# Patient Record
Sex: FEMALE | Race: WHITE | NOT HISPANIC OR LATINO | Employment: FULL TIME | URBAN - METROPOLITAN AREA
[De-identification: names, ages, dates, MRNs, and addresses within clinical notes are randomized per-mention and may not be internally consistent; named-entity substitution may affect disease eponyms.]

---

## 2022-07-26 ENCOUNTER — TELEPHONE (OUTPATIENT)
Dept: NEUROLOGY | Facility: CLINIC | Age: 67
End: 2022-07-26
Payer: COMMERCIAL

## 2022-07-26 NOTE — TELEPHONE ENCOUNTER
----- Message from Mckayla Savage sent at 7/26/2022  4:44 PM CDT -----  Contact: JEANNETTE SAMANIEGO [[id number]]  Type: Patient Call Back    Who called:JEANNETTE SAMANIEGO [[id number]]    What is the request in detail: The patient states that she was contacted about her appointment being scheduled incorrectly. She would like to know what she is supposed to do in regards to her appointment. She states that she came from [place] and has a hotel to she will have to be seen on tomorrow.      Can the clinic reply by [facility]?    Would the patient rather a call back or a response via [facility]?     Best call back number: [phone] (mobile)    Additional Information:

## 2022-07-26 NOTE — TELEPHONE ENCOUNTER
Spoke with patient and informed have not heard back from movement clinic. Patient came here from [place]  For appointment on 7/27 and got a hotel room. Advised will reach out to manager to see if there is any way to get patient seen by movement instead since she has travelled and paid for hotel. Also sent message to Eugene Kahn to see if patient can be seen.

## 2022-07-27 ENCOUNTER — TELEPHONE (OUTPATIENT)
Dept: NEUROLOGY | Facility: CLINIC | Age: 67
End: 2022-07-27

## 2022-07-27 ENCOUNTER — OFFICE VISIT (OUTPATIENT)
Dept: NEUROLOGY | Facility: CLINIC | Age: 67
End: 2022-07-27
Payer: MEDICARE

## 2022-07-27 VITALS — DIASTOLIC BLOOD PRESSURE: 84 MMHG | SYSTOLIC BLOOD PRESSURE: 138 MMHG | HEART RATE: 79 BPM | HEIGHT: 63 IN

## 2022-07-27 DIAGNOSIS — Z71.89 COUNSELING REGARDING GOALS OF CARE: ICD-10-CM

## 2022-07-27 DIAGNOSIS — G25.0 ESSENTIAL TREMOR: Primary | ICD-10-CM

## 2022-07-27 PROBLEM — G25.81 RLS (RESTLESS LEGS SYNDROME): Status: ACTIVE | Noted: 2020-12-29

## 2022-07-27 PROBLEM — Z78.9 STATIN INTOLERANCE: Status: ACTIVE | Noted: 2017-06-01

## 2022-07-27 PROCEDURE — 99205 OFFICE O/P NEW HI 60 MIN: CPT | Mod: S$PBB,,, | Performed by: PHYSICIAN ASSISTANT

## 2022-07-27 PROCEDURE — 99205 PR OFFICE/OUTPT VISIT, NEW, LEVL V, 60-74 MIN: ICD-10-PCS | Mod: S$PBB,,, | Performed by: PHYSICIAN ASSISTANT

## 2022-07-27 PROCEDURE — 99212 OFFICE O/P EST SF 10 MIN: CPT | Mod: PBBFAC | Performed by: PHYSICIAN ASSISTANT

## 2022-07-27 PROCEDURE — 99999 PR PBB SHADOW E&M-EST. PATIENT-LVL II: CPT | Mod: PBBFAC,,, | Performed by: PHYSICIAN ASSISTANT

## 2022-07-27 PROCEDURE — 99999 PR PBB SHADOW E&M-EST. PATIENT-LVL II: ICD-10-PCS | Mod: PBBFAC,,, | Performed by: PHYSICIAN ASSISTANT

## 2022-07-27 RX ORDER — CARVEDILOL 12.5 MG/1
12.5 TABLET ORAL 2 TIMES DAILY
COMMUNITY
Start: 2022-04-11

## 2022-07-27 RX ORDER — BUTALBITAL, ACETAMINOPHEN AND CAFFEINE 50; 325; 40 MG/1; MG/1; MG/1
1-2 TABLET ORAL 3 TIMES DAILY PRN
Status: ON HOLD | COMMUNITY
Start: 2022-02-15 | End: 2023-04-25 | Stop reason: HOSPADM

## 2022-07-27 RX ORDER — LEVOTHYROXINE SODIUM 112 UG/1
112 TABLET ORAL
COMMUNITY
Start: 2022-02-14 | End: 2023-07-28

## 2022-07-27 RX ORDER — BUSPIRONE HYDROCHLORIDE 15 MG/1
7.5-15 TABLET ORAL
Status: ON HOLD | COMMUNITY
Start: 2021-11-17 | End: 2023-04-19

## 2022-07-27 RX ORDER — ERGOCALCIFEROL 1.25 MG/1
50000 CAPSULE ORAL
COMMUNITY
Start: 2022-07-08

## 2022-07-27 RX ORDER — BUPROPION HYDROCHLORIDE 300 MG/1
300 TABLET ORAL DAILY PRN
COMMUNITY
Start: 2022-05-19

## 2022-07-27 RX ORDER — FAMOTIDINE 20 MG/1
20 TABLET, FILM COATED ORAL NIGHTLY
COMMUNITY
Start: 2022-03-01

## 2022-07-27 RX ORDER — SERTRALINE HYDROCHLORIDE 100 MG/1
100 TABLET, FILM COATED ORAL NIGHTLY
Status: ON HOLD | COMMUNITY
Start: 2022-05-19 | End: 2024-01-22

## 2022-07-27 RX ORDER — PHENTERMINE HYDROCHLORIDE 15 MG/1
15 CAPSULE ORAL DAILY
COMMUNITY
Start: 2022-07-12 | End: 2022-12-12

## 2022-07-27 NOTE — PROGRESS NOTES
Name: Anna Wagoner  MRN: [id number]   CSN: [id number]      Date: 07/27/2022    Referring physician:  Uriah Self  No address on file    Chief Complaint: tremor     History of Present Illness (HPI): Anna Wagoner is a R HANDED 67 y.o. female with a medical issues significant for HTN, HLD, hypothyroidism (hashimotos), and anxiety who presents for tremors. Referral from [facility]. Accompanied by daughter. Previously on primidone which she felt caused weight gain and constipation. No longer on it. She tried gabapentin and topamax in the past but stop due to cognitive side effects.  Propranolol caused nightmares and hallucinations?   She has had the tremors since she was at least 11 or 12 years old. Worse on the L hand now, feels that the tremor is progressing. Notices tremor whenever she is typing as well as whenever she is holding something. It does affect her handwriting. Tremor improves with EtOH. She is in a [email or web address] group. She works full-time in [email or web address]. Plans to work for 5 more years.   Primidone helped the tremor but after a few weeks didn't find it as effective. Max dose was 100 mg BID. Felt that she was gaining weight on primidone. Does not think that this was because of thyroid issues. Per chart review, clonazepam caused daytime lethargy but she does not recall this. She asks about medications that she can take during the day.   Very sensitive to side effects of medications.   Currently taking phentermine right now for weight loss. Learning how to eat different. Years ago she tried topamax but caused cognitive side effects.    Not interested in DBS or focused ultrasound.   Asks about BPPV, sees ENT in [place].       From outside neurology provider note  67 y.o. female presents today for neurological follow up. Last seen in follow up with Dr. Desai in January 2022, when primidone was prescribed given findings most consistent with essential tremor. She does have an essential tremor  (both limbs and voice) which she has had since about age 11. Tremor more noticeable in left upper extremity than right. Requests referral to movement specialist. Scheduled to see weight-loss specialist given weight gain she attributes to Primidone.   Neurocognitive status: Neurocognitive function has remained stable.   Current pertinent medications:Primidone triggered weight gain and constipation, so she has discontinued. She tried gabapentin and topamax, but discontinued due to cognitive side effects. Klonopin prn triggered daytime lethargy. Propranolol triggered nightmares and hallucinations.           Family History: none     Neuroleptic Exposure: none          Review of Systems:   Review of Systems   Constitutional: Negative for chills, fever and malaise/fatigue.   HENT: Negative for hearing loss.    Eyes: Negative for blurred vision and double vision.   Respiratory: Negative for cough, shortness of breath and stridor.    Cardiovascular: Negative for chest pain and leg swelling.   Gastrointestinal: Negative for constipation, diarrhea and nausea.   Genitourinary: Negative for frequency and urgency.   Musculoskeletal: Negative for falls.   Skin: Negative for itching and rash.   Neurological: Positive for tremors. Negative for dizziness, loss of consciousness and weakness.   Psychiatric/Behavioral: Negative for hallucinations and memory loss.           Past Medical History: The patient  has no past medical history on file.    Social History: The patient      Family History: Their family history is not on file.    Allergies: Metoprolol succinate     Meds:   Current Outpatient Medications on File Prior to Visit   Medication Sig Dispense Refill    busPIRone (BUSPAR) 15 MG tablet Take 7.5-15 mg by mouth.      famotidine (PEPCID) 20 MG tablet Take 20 mg by mouth.      levothyroxine (SYNTHROID) 112 MCG tablet Take 112 mcg by mouth.      buPROPion (WELLBUTRIN XL) 300 MG 24 hr tablet Take 300 mg by mouth daily as  "needed.      butalbital-acetaminophen-caffeine -40 mg (FIORICET, ESGIC) -40 mg per tablet Take 1-2 tablets by mouth 3 (three) times daily as needed.      carvediloL (COREG) 12.5 MG tablet Take 12.5 mg by mouth 2 (two) times daily.      ergocalciferol (ERGOCALCIFEROL) 50,000 unit Cap Take 50,000 Units by mouth every 7 days.      phentermine 15 MG capsule Take 15 mg by mouth once daily.      sertraline (ZOLOFT) 100 MG tablet Take 100 mg by mouth once daily.      ubidecarenone (COENZYME Q10 ORAL) Take 2 tablets by mouth once daily.       No current facility-administered medications on file prior to visit.       Exam:  /84 (BP Location: Left arm, Patient Position: Sitting, BP Method: Medium (Automatic))   Pulse 79   Ht 5' 3" (1.6 m)     Constitutional  Well-developed, well-nourished, appears stated age   Ophthalmoscopic  No papilledema with no hemorrhages or exudates bilaterally   Cardiovascular  Radial pulses 2+ and symmetric, no LE edema bilaterally   Neurological    * Mental status  MOCA =      - Orientation  Oriented to person, place, time, and situation     - Memory   Intact recent and remote     - Attention/concentration  Attentive, vigilant during exam     - Language  Naming & repetition intact, +2-step commands     - Fund of knowledge  Aware of current events     - Executive  Well-organized thoughts     - Other     * Cranial nerves       - CN II  PERRL, visual fields full to confrontation     - CN III, IV, VI  Extraocular movements full, normal pursuits and saccades     - CN V  Sensation V1 - V3 intact     - CN VII  Face strong and symmetric bilaterally     - CN VIII  Hearing intact bilaterally     - CN IX, X  Palate raises midline and symmetric     - CN XI  SCM and trapezius 5/5 bilaterally     - CN XII  Tongue midline   * Motor  Muscle bulk normal, strength 5/5 throughout   * Sensory   Intact to temperature    * Coordination  No dysmetria with finger-to-nose or heel-to-shin   * Gait  " See below.   * Deep tendon reflexes  2+ and symmetric throughout   Babinski downgoing bilaterally   * Specialized movement exam  No hypophonic speech, very pleasant, appropriately animated    No facial masking.   No cogwheel rigidity, some difficulty relaxing. Tremor overlay of LUE with checking tone.    mild L bradykinesia 2/2 tremor overlay    No other dystonia, chorea, athetosis, myoclonus, or tics.   No motor impersistence.   Normal-based gait.   No shortened stride length.   No abnormal arm swing.     No postural instability.    vocal tremor     bilateral postural tremors and kinetic tremor, L > R -- high frequency     bilateral resting tremor, high frequency  L > R      Laboratory/Radiological:  - Results:  No results found for any previous visit.       - Independent review of images:      - Independent review of consultant's notes: Giselle    ASSESSMENT/PLAN:  1. Tremor   - essential tremor x 50+ years   - tried and failed primidone, propranolol, topamax, gabapentin, clonazepam all due to side effects  - has not tried remeron but would be relucant to try given past weight gain/currently trying to lose weight  - discussed DBS vs FUS -- not interested  - discussed alternative strategies such as ready-steady glove and weighted utensils  - patient asked about luigi-trio -- we discussed that we have not had great success for tremor but will send rx for trial of luigi-trio pending insurance approval since she has failed just about every ET medication   - we also discussed that she is currently taking phentermine which is likely exacerbating her tremor -- she states that this is temporary for weight loss before granddaughter's wedding              Follow up: in 3-4 months with [facility]     Collaborating Physician, Dr. Jimenez, was available during today's encounter. Any change to plan along with cosign to appear in the EMR.       Total time spent with the patient: 65 minutes.  50 minutes of face-to-face consultation and  15 minutes of chart review and coordination of care, on the day of the visit. This includes face to face time and non-face to face time preparing to see the patient (eg, review of tests), obtaining and/or reviewing separately obtained history, documenting clinical information in the electronic or other health record, independently interpreting resultsand communicating results to the patient/family/caregiver, or care coordination.         Kim Montgomery PA-C   [facility]  Department of Neurology  Movement Disorders

## 2022-07-27 NOTE — TELEPHONE ENCOUNTER
----- Message from Brittney Anderson MA sent at 7/26/2022  7:02 PM CDT -----  Regarding: FW: Pt needs a neuro doctor that specializes in movement.  Contact: [place]ma[place] (Dr. Boswell) [phone]  I think this was suppose to go to Neurology.    Thanks  ----- Message -----  From: Bre Guadarrama  Sent: 7/26/2022   4:24 PM CDT  To: , #  Subject: Pt needs a neuro doctor that specializes in #    Caller (Rosemarie) Dr. Boswell office requesting a call back to see if the patient can be seen by tomorrow as the patient got an hotel to come down for a visit with Dr. Boswell and he does not specialize in movement.

## 2022-07-28 ENCOUNTER — TELEPHONE (OUTPATIENT)
Dept: NEUROLOGY | Facility: CLINIC | Age: 67
End: 2022-07-28
Payer: COMMERCIAL

## 2022-08-02 ENCOUNTER — PATIENT MESSAGE (OUTPATIENT)
Dept: NEUROLOGY | Facility: CLINIC | Age: 67
End: 2022-08-02
Payer: COMMERCIAL

## 2022-12-12 ENCOUNTER — OFFICE VISIT (OUTPATIENT)
Dept: NEUROLOGY | Facility: CLINIC | Age: 67
End: 2022-12-12
Payer: MEDICARE

## 2022-12-12 VITALS
DIASTOLIC BLOOD PRESSURE: 73 MMHG | BODY MASS INDEX: 35.76 KG/M2 | SYSTOLIC BLOOD PRESSURE: 122 MMHG | HEART RATE: 64 BPM | WEIGHT: 201.81 LBS | HEIGHT: 63 IN

## 2022-12-12 DIAGNOSIS — F41.9 ANXIETY: ICD-10-CM

## 2022-12-12 DIAGNOSIS — R51.9 CHRONIC NONINTRACTABLE HEADACHE, UNSPECIFIED HEADACHE TYPE: ICD-10-CM

## 2022-12-12 DIAGNOSIS — Z71.89 COUNSELING REGARDING GOALS OF CARE: ICD-10-CM

## 2022-12-12 DIAGNOSIS — G25.0 ESSENTIAL TREMOR: Primary | ICD-10-CM

## 2022-12-12 DIAGNOSIS — G89.29 CHRONIC NONINTRACTABLE HEADACHE, UNSPECIFIED HEADACHE TYPE: ICD-10-CM

## 2022-12-12 PROCEDURE — 99214 OFFICE O/P EST MOD 30 MIN: CPT | Mod: PBBFAC | Performed by: PHYSICIAN ASSISTANT

## 2022-12-12 PROCEDURE — 99214 OFFICE O/P EST MOD 30 MIN: CPT | Mod: S$PBB,,, | Performed by: PHYSICIAN ASSISTANT

## 2022-12-12 PROCEDURE — 99999 PR PBB SHADOW E&M-EST. PATIENT-LVL IV: CPT | Mod: PBBFAC,,, | Performed by: PHYSICIAN ASSISTANT

## 2022-12-12 PROCEDURE — 99999 PR PBB SHADOW E&M-EST. PATIENT-LVL IV: ICD-10-PCS | Mod: PBBFAC,,, | Performed by: PHYSICIAN ASSISTANT

## 2022-12-12 PROCEDURE — 99214 PR OFFICE/OUTPT VISIT, EST, LEVL IV, 30-39 MIN: ICD-10-PCS | Mod: S$PBB,,, | Performed by: PHYSICIAN ASSISTANT

## 2022-12-12 RX ORDER — ROSUVASTATIN CALCIUM 10 MG/1
10 TABLET, COATED ORAL NIGHTLY
COMMUNITY

## 2022-12-12 RX ORDER — PRIMIDONE 50 MG/1
50 TABLET ORAL 2 TIMES DAILY
Qty: 60 TABLET | Refills: 11 | Status: ON HOLD | OUTPATIENT
Start: 2022-12-12 | End: 2023-04-25 | Stop reason: HOSPADM

## 2022-12-12 NOTE — PATIENT INSTRUCTIONS
Primidone 50 mg titration    Week 1: Take 1/2 tab in PM  Week 2: Take 1/2 tab in AM and 1/2 tab in PM  Week 3: Take 1/2 tab in AM and 1 tab in PM  Week 4: Take 1 tab in AM and 1 tab in PM    If adequately improved, can stop and maintain at any level of the titration.  Call me with an update anytime.

## 2022-12-12 NOTE — PROGRESS NOTES
Name: Anna Wagoner  MRN: [id number]   CSN: [id number]      Date: 12/12/2022    Referring physician:  No referring provider defined for this encounter.    Chief Complaint: tremor       Interval History:  - luigi-trio 60 day trial -- did not notice any improvement with the tremor  - L hand is a little worse, some change in direction   - notices it when holding things   - drops things with her R hand   - now off of phentermine   - feels that tremors slowed down with phentermine and even more with the increased dose   - drinks one cup of coffee in the morning    - tremor improves with etoh   - chronic headaches/migraines -- interested in seeing headache specialist here         From July 2022: Anna Wagoner is a R HANDED 67 y.o. female with a medical issues significant for HTN, HLD, hypothyroidism (hashimotos), and anxiety who presents for tremors. Referral from [facility]. Accompanied by daughter. Previously on primidone which she felt caused weight gain and constipation. No longer on it. She tried gabapentin and topamax in the past but stop due to cognitive side effects.  Propranolol caused nightmares and hallucinations?   She has had the tremors since she was at least 11 or 12 years old. Worse on the L hand now, feels that the tremor is progressing. Notices tremor whenever she is typing as well as whenever she is holding something. It does affect her handwriting. Tremor improves with EtOH. She is in a [employer] group. 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   Primidone helped the tremor but after a few weeks didn't find it as effective. Max dose was 100 mg BID. Felt that she was gaining weight on primidone. Does not think that this was because of thyroid issues. Per chart review, clonazepam caused daytime lethargy but she does not recall this. She asks about medications that she can take during the day.   Very sensitive to side effects of medications.   Currently taking phentermine  right now for weight loss. Learning how to eat different. Years ago she tried topamax but caused cognitive side effects.    Not interested in DBS or focused ultrasound.   Asks about BPPV, sees ENT in [place].       From outside neurology provider note  67 y.o. female presents today for neurological follow up. Last seen in follow up with Dr. Desai in January 2022, when primidone was prescribed given findings most consistent with essential tremor. She does have an essential tremor (both limbs and voice) which she has had since about age 11. Tremor more noticeable in left upper extremity than right. Requests referral to movement specialist. Scheduled to see weight-loss specialist given weight gain she attributes to Primidone.   Neurocognitive status: Neurocognitive function has remained stable.   Current pertinent medications:Primidone triggered weight gain and constipation, so she has discontinued. She tried gabapentin and topamax, but discontinued due to cognitive side effects. Klonopin prn triggered daytime lethargy. Propranolol triggered nightmares and hallucinations.         Family History: none     Neuroleptic Exposure: none        Review of Systems:   Review of Systems   Constitutional:  Negative for chills, fever and malaise/fatigue.   HENT:  Negative for hearing loss.    Eyes:  Negative for blurred vision and double vision.   Respiratory:  Negative for cough, shortness of breath and stridor.    Cardiovascular:  Negative for chest pain and leg swelling.   Gastrointestinal:  Negative for constipation, diarrhea and nausea.   Genitourinary:  Negative for frequency and urgency.   Musculoskeletal:  Negative for falls.   Skin:  Negative for itching and rash.   Neurological:  Positive for tremors. Negative for dizziness, loss of consciousness and weakness.   Psychiatric/Behavioral:  Negative for hallucinations and memory loss.          Past Medical History: The patient  has no past medical history on  "file.    Social History: The patient      Family History: Their family history is not on file.    Allergies: Metoprolol succinate     Meds:   Current Outpatient Medications on File Prior to Visit   Medication Sig Dispense Refill    buPROPion (WELLBUTRIN XL) 300 MG 24 hr tablet Take 300 mg by mouth daily as needed.      butalbital-acetaminophen-caffeine -40 mg (FIORICET, ESGIC) -40 mg per tablet Take 1-2 tablets by mouth 3 (three) times daily as needed.      carvediloL (COREG) 12.5 MG tablet Take 12.5 mg by mouth 2 (two) times daily.      ergocalciferol (ERGOCALCIFEROL) 50,000 unit Cap Take 50,000 Units by mouth every 7 days.      famotidine (PEPCID) 20 MG tablet Take 20 mg by mouth.      levothyroxine (SYNTHROID) 112 MCG tablet Take 112 mcg by mouth.      rosuvastatin (CRESTOR) 10 MG tablet Take 10 mg by mouth once daily.      sertraline (ZOLOFT) 100 MG tablet Take 100 mg by mouth once daily.      ubidecarenone (COENZYME Q10 ORAL) Take 2 tablets by mouth once daily.      busPIRone (BUSPAR) 15 MG tablet Take 7.5-15 mg by mouth.      [DISCONTINUED] phentermine 15 MG capsule Take 15 mg by mouth once daily.       No current facility-administered medications on file prior to visit.       Exam:  /73 (BP Location: Right arm, Patient Position: Sitting, BP Method: Medium (Automatic))   Pulse 64   Ht 5' 3" (1.6 m)   Wt 91.6 kg (201 lb 13.3 oz)   BMI 35.75 kg/m²     Constitutional  Well-developed, well-nourished, appears stated age   Ophthalmoscopic  No papilledema with no hemorrhages or exudates bilaterally   Cardiovascular  Radial pulses 2+ and symmetric, no LE edema bilaterally   Neurological    * Mental status  MOCA =      - Orientation  Oriented to person, place, time, and situation     - Memory   Intact recent and remote     - Attention/concentration  Attentive, vigilant during exam     - Language  Naming & repetition intact, +2-step commands     - Fund of knowledge  Aware of current events     - " Executive  Well-organized thoughts     - Other     * Cranial nerves       - CN II  PERRL, visual fields full to confrontation     - CN III, IV, VI  Extraocular movements full, normal pursuits and saccades     - CN V  Sensation V1 - V3 intact     - CN VII  Face strong and symmetric bilaterally     - CN VIII  Hearing intact bilaterally     - CN IX, X  Palate raises midline and symmetric     - CN XI  SCM and trapezius 5/5 bilaterally     - CN XII  Tongue midline   * Motor  Muscle bulk normal, strength 5/5 throughout   * Sensory   Intact to temperature    * Coordination  No dysmetria with finger-to-nose or heel-to-shin   * Gait  See below.   * Deep tendon reflexes  2+ and symmetric throughout   Babinski downgoing bilaterally   * Specialized movement exam  No hypophonic speech, very pleasant, appropriately animated    No facial masking.   No cogwheel rigidity, some difficulty relaxing. Tremor overlay of LUE with checking tone.    mild L bradykinesia 2/2 tremor overlay    No other dystonia, chorea, athetosis, myoclonus, or tics.   No motor impersistence.   Normal-based gait.   No shortened stride length.   No abnormal arm swing.     No postural instability.    vocal tremor     bilateral postural tremors and kinetic tremor, L > R -- high frequency     bilateral resting tremor, high frequency  L > R      Laboratory/Radiological:  - Results:  No visits with results within 3 Month(s) from this visit.   Latest known visit with results is:   No results found for any previous visit.       - Independent review of images:      - Independent review of consultant's notes: Giselle    ASSESSMENT/PLAN:  1. Tremor   - essential tremor x 50+ years   - tried and failed primidone, propranolol, topamax, gabapentin, clonazepam all due to side effects  - has not tried remeron but would be relucant to try given past weight gain/currently trying to lose weight  - discussed DBS vs FUS -- not interested at this time but may be open to discussing  in the future   - discussed alternative strategies such as ready-steady glove and weighted utensils  - failed luigi-trio   - now off of phentermine -- felt that tremor had improved   - retrying primidone -- was effective in the past but felt that it caused weight gain       Orders Placed This Encounter    primidone (MYSOLINE) 50 MG Tab         Follow up: in 3-4 months with [facility]     Collaborating Physician, Dr. Jimenez, was available during today's encounter. Any change to plan along with cosign to appear in the EMR.       Total time spent with the patient: 36 minutes.  24 minutes of face-to-face consultation and 12 minutes of chart review and coordination of care, on the day of the visit. This includes face to face time and non-face to face time preparing to see the patient (eg, review of tests), obtaining and/or reviewing separately obtained history, documenting clinical information in the electronic or other health record, independently interpreting resultsand communicating results to the patient/family/caregiver, or care coordination.         Kim Montgomery PA-C   [facility]  Department of Neurology  Movement Disorders

## 2023-01-23 ENCOUNTER — PATIENT MESSAGE (OUTPATIENT)
Dept: NEUROLOGY | Facility: CLINIC | Age: 68
End: 2023-01-23
Payer: MEDICARE

## 2023-01-27 ENCOUNTER — PATIENT MESSAGE (OUTPATIENT)
Dept: NEUROLOGY | Facility: CLINIC | Age: 68
End: 2023-01-27
Payer: MEDICARE

## 2023-02-01 ENCOUNTER — TELEPHONE (OUTPATIENT)
Dept: NEUROLOGY | Facility: CLINIC | Age: 68
End: 2023-02-01

## 2023-02-01 NOTE — TELEPHONE ENCOUNTER
Spoke with Veronica to schedule a DBS consult visit with Dr. Shea. While she was unavailable for this Friday, we were able to confirm a 1 PM new patient intake visit for tomorrow afternoon.       - - -    I offered general information regarding DBS timeline and utilization, planning process, and format of tomorrow's visit.  Veronica clarified that she is not currently taking primidone as she experienced no benefit, so will not necessarily have medications to withhold in advance of tomorrow's evaluation.

## 2023-02-02 ENCOUNTER — LAB VISIT (OUTPATIENT)
Dept: LAB | Facility: HOSPITAL | Age: 68
End: 2023-02-02
Attending: PSYCHIATRY & NEUROLOGY
Payer: MEDICARE

## 2023-02-02 ENCOUNTER — OFFICE VISIT (OUTPATIENT)
Dept: NEUROLOGY | Facility: CLINIC | Age: 68
End: 2023-02-02
Payer: MEDICARE

## 2023-02-02 VITALS
HEART RATE: 70 BPM | DIASTOLIC BLOOD PRESSURE: 77 MMHG | WEIGHT: 201.94 LBS | HEIGHT: 63 IN | BODY MASS INDEX: 35.78 KG/M2 | SYSTOLIC BLOOD PRESSURE: 139 MMHG

## 2023-02-02 DIAGNOSIS — G25.0 ESSENTIAL TREMOR: ICD-10-CM

## 2023-02-02 DIAGNOSIS — G25.0 ESSENTIAL TREMOR: Primary | ICD-10-CM

## 2023-02-02 DIAGNOSIS — M54.2 NECK PAIN: ICD-10-CM

## 2023-02-02 LAB
T4 FREE SERPL-MCNC: 1.25 NG/DL (ref 0.71–1.51)
TSH SERPL DL<=0.005 MIU/L-ACNC: 0.15 UIU/ML (ref 0.4–4)

## 2023-02-02 PROCEDURE — 84439 ASSAY OF FREE THYROXINE: CPT | Performed by: PSYCHIATRY & NEUROLOGY

## 2023-02-02 PROCEDURE — 99214 OFFICE O/P EST MOD 30 MIN: CPT | Mod: S$PBB,,, | Performed by: PSYCHIATRY & NEUROLOGY

## 2023-02-02 PROCEDURE — 36415 COLL VENOUS BLD VENIPUNCTURE: CPT | Performed by: PSYCHIATRY & NEUROLOGY

## 2023-02-02 PROCEDURE — 99999 PR PBB SHADOW E&M-EST. PATIENT-LVL IV: ICD-10-PCS | Mod: PBBFAC,,, | Performed by: PSYCHIATRY & NEUROLOGY

## 2023-02-02 PROCEDURE — 99999 PR PBB SHADOW E&M-EST. PATIENT-LVL IV: CPT | Mod: PBBFAC,,, | Performed by: PSYCHIATRY & NEUROLOGY

## 2023-02-02 PROCEDURE — 84443 ASSAY THYROID STIM HORMONE: CPT | Performed by: PSYCHIATRY & NEUROLOGY

## 2023-02-02 PROCEDURE — 99214 PR OFFICE/OUTPT VISIT, EST, LEVL IV, 30-39 MIN: ICD-10-PCS | Mod: S$PBB,,, | Performed by: PSYCHIATRY & NEUROLOGY

## 2023-02-02 PROCEDURE — 99214 OFFICE O/P EST MOD 30 MIN: CPT | Mod: PBBFAC | Performed by: PSYCHIATRY & NEUROLOGY

## 2023-02-02 RX ORDER — BACLOFEN 10 MG/1
10 TABLET ORAL NIGHTLY
Qty: 30 TABLET | Refills: 11 | Status: ON HOLD | OUTPATIENT
Start: 2023-02-02 | End: 2023-07-11

## 2023-02-02 NOTE — PROGRESS NOTES
Movement Disorders    Name: Anna Wagoner  MRN: [id number]   CSN: [id number]      Date: 02/02/2023    Referring physician:  No referring provider defined for this encounter.      Chief Complaint: tremor     Interval Hx  Since last visit with RR, comes for evaluation for DBS  R handed woman.  Tried the CHERI trio which did not help.  He reports a b/l hand tremor since 9-9yo slowly progressive over her lifetime L>R in the last few years. Started to have a vocal tremor and head tremor in the last years. At this point she can no longer put on makeup, cannot write. At times drops items. Struggles to type and double types.    Since several year her R hand may cramp and fingers draw up.  At times feet cramp.  Chronic neck strain. Mentions she hs spinal stenosis but no record of MRI documenting.  Takes zanaflex 8mg QHS.  Had hallucinations on propranolol    Reports had a DATscan 2021 - [place]    At times unsteady gait. Falls once a year.  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 Some word-finding and repeating asking for tasks.    Son has a tremor  Otherwise no fam hx tremor    ----------------  Prior  Interval History:  - cheri-trio 60 day trial -- did not notice any improvement with the tremor  - L hand is a little worse, some change in direction   - notices it when holding things   - drops things with her R hand   - now off of phentermine   - feels that tremors slowed down with phentermine and even more with the increased dose   - drinks one cup of coffee in the morning    - tremor improves with etoh   - chronic headaches/migraines -- interested in seeing headache specialist here         From July 2022: Anna Wagoner is a R HANDED 67 y.o. female with a medical issues significant for HTN, HLD, hypothyroidism (hashimotos), and anxiety who presents for tremors. Referral from [facility]. Accompanied by daughter. Previously on primidone which she felt caused weight gain and constipation. No  longer on it. She tried gabapentin and topamax in the past but stop due to cognitive side effects.  Propranolol caused nightmares and hallucinations?   She has had the tremors since she was at least 11 or 12 years old. Worse on the L hand now, feels that the tremor is progressing. Notices tremor whenever she is typing as well as whenever she is holding something. It does affect her handwriting. Tremor improves with EtOH. She is in a facebook ET group. She works full-time in [employer]. Plans to work for 5 more years.   Primidone helped the tremor but after a few weeks didn't find it as effective. Max dose was 100 mg BID. Felt that she was gaining weight on primidone. Does not think that this was because of thyroid issues. Per chart review, clonazepam caused daytime lethargy but she does not recall this. She asks about medications that she can take during the day.   Very sensitive to side effects of medications.   Currently taking phentermine right now for weight loss. Learning how to eat different. Years ago she tried topamax but caused cognitive side effects.    Not interested in DBS or focused ultrasound.   Asks about BPPV, sees ENT in [place].       From outside neurology provider note  67 y.o. female presents today for neurological follow up. Last seen in follow up with Dr. Desai in January 2022, when primidone was prescribed given findings most consistent with essential tremor. She does have an essential tremor (both limbs and voice) which she has had since about age 11. Tremor more noticeable in left upper extremity than right. Requests referral to movement specialist. Scheduled to see weight-loss specialist given weight gain she attributes to Primidone.   Neurocognitive status: Neurocognitive function has remained stable.   Current pertinent medications:Primidone triggered weight gain and constipation, so she has discontinued. She tried gabapentin and topamax, but discontinued due to cognitive side  effects. Klonopin prn triggered daytime lethargy. Propranolol triggered nightmares and hallucinations.         Family History: none     Neuroleptic Exposure: none        Review of Systems:   Review of Systems   Constitutional:  Negative for chills, fever and malaise/fatigue.   HENT:  Negative for hearing loss.    Eyes:  Negative for blurred vision and double vision.   Respiratory:  Negative for cough, shortness of breath and stridor.    Cardiovascular:  Negative for chest pain and leg swelling.   Gastrointestinal:  Negative for constipation, diarrhea and nausea.   Genitourinary:  Negative for frequency and urgency.   Musculoskeletal:  Negative for falls.   Skin:  Negative for itching and rash.   Neurological:  Positive for tremors. Negative for dizziness, loss of consciousness and weakness.   Psychiatric/Behavioral:  Negative for hallucinations and memory loss.          Past Medical History: The patient  has no past medical history on file.    Social History: The patient      Family History: Their family history is not on file.    Allergies: Metoprolol succinate     Meds:   Current Outpatient Medications on File Prior to Visit   Medication Sig Dispense Refill    buPROPion (WELLBUTRIN XL) 300 MG 24 hr tablet Take 300 mg by mouth daily as needed.      butalbital-acetaminophen-caffeine -40 mg (FIORICET, ESGIC) -40 mg per tablet Take 1-2 tablets by mouth 3 (three) times daily as needed.      carvediloL (COREG) 12.5 MG tablet Take 12.5 mg by mouth 2 (two) times daily.      ergocalciferol (ERGOCALCIFEROL) 50,000 unit Cap Take 50,000 Units by mouth every 7 days.      famotidine (PEPCID) 20 MG tablet Take 20 mg by mouth.      levothyroxine (SYNTHROID) 112 MCG tablet Take 112 mcg by mouth.      primidone (MYSOLINE) 50 MG Tab Take 1 tablet (50 mg total) by mouth 2 (two) times a day. 60 tablet 11    rosuvastatin (CRESTOR) 10 MG tablet Take 10 mg by mouth once daily.      sertraline (ZOLOFT) 100 MG tablet Take 100  "mg by mouth once daily.      ubidecarenone (COENZYME Q10 ORAL) Take 2 tablets by mouth once daily.      busPIRone (BUSPAR) 15 MG tablet Take 7.5-15 mg by mouth.       No current facility-administered medications on file prior to visit.       Exam:  /77   Pulse 70   Ht 5' 3" (1.6 m)   Wt 91.6 kg (201 lb 15.1 oz)   BMI 35.77 kg/m²     Constitutional  Well-developed, well-nourished, appears stated age   Ophthalmoscopic  No papilledema with no hemorrhages or exudates bilaterally   Cardiovascular  Radial pulses 2+ and symmetric, no LE edema bilaterally   Neurological    * Mental status  MOCA =      - Orientation  Oriented to person, place, time, and situation     - Memory   Intact recent and remote     - Attention/concentration  Attentive, vigilant during exam     - Language  Naming & repetition intact, +2-step commands     - Fund of knowledge  Aware of current events     - Executive  Well-organized thoughts     - Other     * Cranial nerves       - CN II  PERRL, visual fields full to confrontation     - CN III, IV, VI  Extraocular movements full, normal pursuits and saccades     - CN V  Sensation V1 - V3 intact     - CN VII  Face strong and symmetric bilaterally     - CN VIII  Hearing intact bilaterally     - CN IX, X  Palate raises midline and symmetric     - CN XI  SCM and trapezius 5/5 bilaterally     - CN XII  Tongue midline   * Motor  Muscle bulk normal, strength 5/5 throughout   * Sensory   Intact to temperature    * Coordination  No dysmetria with finger-to-nose or heel-to-shin   * Gait  See below.   * Deep tendon reflexes  3+ palellae b/l  Hoffmans NEG   * Specialized movement exam  No hypophonic speech, very pleasant, appropriately animated    No facial masking.   No cogwheel rigidity     Mild wide-based gait   No shortened stride length.   No abnormal arm swing.     No postural instability.              Tremor Exam   Arms extended Arms in wing position, fingers almost touching Re-emergent Arms " extended wrists extended Intention Resting Kinetic   Left ?++ ? ? ? ?+ ? ?++   Right ?+ ? ? ? ?+ ? ?+   Head tremor: No-No   Vocal Tremor on EEEs and AAAs: POS  Leg tremor: POS      Archimedes Spirals   Left ?++   Right ?+       Laboratory/Radiological:  - Results:  No visits with results within 3 Month(s) from this visit.   Latest known visit with results is:   No results found for any previous visit.         Problem List Items Addressed This Visit          Neuro    Essential tremor - Primary    Current Assessment & Plan     Longtsanding ET-like tremor  At times dystonic pulling R hand and possibly neck typical to ET  ETOH responsive    F/u TSH as she has hashimotos which could also lead to tremor  Mild gait instability likely from increased reflexes - f/u brain and cspine MRI  Debilitated by tremor  Suggested consider b/l Vim DBS  See NSGY, and neuropsych for DBS eval.           Relevant Orders    MRI Brain Without Contrast    MRI Cervical Spine Without Contrast    TSH    Ambulatory consult to Neuropsychology    Ambulatory consult to Neurosurgery       Orthopedic    Neck pain    Current Assessment & Plan     Cspine dz vs dystonia  Suggested baclofen to replace Zanaflex 10mg QHS              Zainab Shea MD, MS  [facility]  Department of Neurology  Movement Disorders

## 2023-02-02 NOTE — TELEPHONE ENCOUNTER
----- Message from Chen Kaur sent at 2/2/2023  1:51 PM CST -----   Name of Who is Calling:     What is the request in detail:   pharmacy request call back in reference to medication   baclofen (LIORESAL) 10 MG tablet to notify patient is on medication  Tizanidine 4mg prescribed by  Victor Hugo Evans MD - [facility]   Please contact to further discuss and advise   pharmacy want to know if want to fill prescription     Can the clinic reply by [facility]:     What Number to Call Back if not in [facility]:  [phone] / abel / [facility]

## 2023-02-02 NOTE — ASSESSMENT & PLAN NOTE
Longtsanding ET-like tremor  At times dystonic pulling R hand and possibly neck typical to ET  ETOH responsive    F/u TSH as she has hashimotos which could also lead to tremor  Mild gait instability likely from increased reflexes - f/u brain and cspine MRI  Debilitated by tremor  Suggested consider b/l Vim DBS  See NSGY, and neuropsych for DBS eval.

## 2023-02-03 ENCOUNTER — TELEPHONE (OUTPATIENT)
Dept: NEUROSURGERY | Facility: CLINIC | Age: 68
End: 2023-02-03
Payer: MEDICARE

## 2023-02-03 NOTE — TELEPHONE ENCOUNTER
Called pt - schd 1hr dbs consult on 3/2    ----- Message from Teetee Pritchard MA sent at 2/2/2023  4:50 PM CST -----    ----- Message -----  From: Zainab Shea MD  Sent: 2/2/2023   1:57 PM CST  To: Sara Milton, PhD, [facility] Staff    DBS workup for ET

## 2023-02-15 ENCOUNTER — TELEPHONE (OUTPATIENT)
Dept: NEUROLOGY | Facility: CLINIC | Age: 68
End: 2023-02-15
Payer: MEDICARE

## 2023-02-15 ENCOUNTER — TELEPHONE (OUTPATIENT)
Dept: NEUROSURGERY | Facility: CLINIC | Age: 68
End: 2023-02-15
Payer: MEDICARE

## 2023-02-15 ENCOUNTER — OFFICE VISIT (OUTPATIENT)
Dept: NEUROLOGY | Facility: CLINIC | Age: 68
End: 2023-02-15
Payer: MEDICARE

## 2023-02-15 DIAGNOSIS — G25.0 ESSENTIAL TREMOR: Primary | ICD-10-CM

## 2023-02-15 DIAGNOSIS — F41.9 ANXIETY: Primary | ICD-10-CM

## 2023-02-15 DIAGNOSIS — R41.89 COGNITIVE CHANGES: ICD-10-CM

## 2023-02-15 DIAGNOSIS — G25.0 ESSENTIAL TREMOR: ICD-10-CM

## 2023-02-15 PROCEDURE — 99499 NO LOS: ICD-10-PCS | Mod: 95,,, | Performed by: CLINICAL NEUROPSYCHOLOGIST

## 2023-02-15 PROCEDURE — 90791 PR PSYCHIATRIC DIAGNOSTIC EVALUATION: ICD-10-PCS | Mod: 95,,, | Performed by: CLINICAL NEUROPSYCHOLOGIST

## 2023-02-15 PROCEDURE — 99499 UNLISTED E&M SERVICE: CPT | Mod: 95,,, | Performed by: CLINICAL NEUROPSYCHOLOGIST

## 2023-02-15 PROCEDURE — 90791 PSYCH DIAGNOSTIC EVALUATION: CPT | Mod: 95,,, | Performed by: CLINICAL NEUROPSYCHOLOGIST

## 2023-02-15 NOTE — PROGRESS NOTES
NEUROPSYCHOLOGICAL EVALUATION - CONFIDENTIAL    Referring Provider: Zainab Shea MD   Medical Necessity: Evaluate cognitive and emotional functioning, participate in treatment planning/management, and provide supportive therapy to help assess candidacy for deep brain stimulation treatment in the setting of essential tremor  Date Conducted: 2/15/2023  Present At Visit: the patient   Billin = 55 minutes  Referral Diagnoses: G25.0 (ICD-10-CM) - Essential tremor  Consent: The patient expressed an understanding of the purpose of the evaluation and consented to all procedures. We discussed the limits of confidentiality and discussed an emergency plan.    Telemedicine Details:   The patient location is: [place]  The chief complaint leading to consultation is: essential tremor  Visit type: Virtual visit with synchronous audio and video  Total time spent with patient: 55 minutes  Each patient to whom he or she provides medical services by telemedicine is: (1) informed of the relationship between the physician and patient and the respective role of any other health care provider with respect to management of the patient; and (2) notified that he or she may decline to receive medical services by telemedicine and may withdraw from such care at any time.    ASSESSMENT & PLAN   Ms. Anna Wagoner is an 67 y.o., right-handed, female with 12 years of education who was referred for a neuropsychological evaluation to help assess candidacy for deep brain stimulation treatment (DBS) in the setting of essential tremor.     The following factors should be considered when planning/discussing surgical candidacy:    Procedure Understanding/Capacity  The patient could describe her goals for the surgery, post-surgery supported needed, and costs/benefits/risks associated with the procedure. No issues/concerns regarding ability to understand and/or consent to treatment.    Ability to Manage Pre/Monique/Post Operative Treatment  No concerns.  "Good support system to help manage.      Cognitive Functioning  TBD risk for post-surgical cognitive decline.    Medication list reveals high risk of anticholingeric burden due to the following combination of medications: Baclofen, Wellbutrin, and Zoloft.     Psychiatric/Neuropsychiatric Considerations  Anxiety - longstanding without any recent worsening.   Reports some history of over-shopping and over-eating but believes she can stop herself if she needed to.   Hallucinations while taking propanolol.     Problem List Items Addressed This Visit          Neuro    Essential tremor       Psychiatric    Anxiety - Primary     Other Visit Diagnoses       Cognitive changes                Thank you for allowing me to assist in Ms. Anna Wagoner's care. If you have any questions, please contact me at [phone].      Sara Milton, PhD  Licensed Clinical Neuropsychologist  [facility] - Department of Neurology    CLINICAL INTERVIEW & RECORD REVIEW     Expectations for Surgery   On two groups of FB. [facility] and another DBS support group. Those were really helpful in learning more about the surgery.   Feels like she has not been interested in surgery for a while, but now has learned a lot and feels ready for it.   Surgery for tremor control.   It's noticeable that I don't do what others do on medications. Propanolol > had horrible hallucinations on that medication. Only doubt and fear is that my DBS will be perfectly done but my body will say no.   Would like surgery soon. Asking about possibly getting scheduled for April.     Cognitive Functioning   Cognitive screener: Recalls completing "30 minutes or so" of testing with Dr. Desai in [place]. By her description, sounds like a cognitive screener.   MoCA = 28/30 (July 2019)  MoCA = 27/30 (June 2021)  Previous evaluation(s): none  Onset & course of difficulty: Some age-related change but has noticed mild changes over the last 5 years that " "have remained stable since onset.   Fluctuations: none  Examples:   Attention/Working Memory/Executive Functioning: losing her train of thought, easily distracted. Feels she can't do mental math as fast as she used to, but blames reliance on calculators   Processing Speed: no problems identified   Language: every now and then has an issue finding a word  Visuospatial: no problems identified   Learning & Memory: used to know phone numbers by heart after the first time dialing. Used to know SS numbers by heart. Blames the cell phone because doesn't used her memory the same way.   Exacerbating factors: none  Ameliorating factors: none  Medication for cognition: none     Daily Functioning   ADLs:    Bathing: Independent and without difficulty  Dressing: Independent and without difficulty  Grooming: Independent and without difficulty   Toileting: Independent and without difficulty  Transferring: Independent and without difficulty.  Eating: Independent and without difficulty.   IADLs:    Finances: Independent and without difficulty  Medication Mgmt: Independent and without difficulty  Driving: Independent and without difficulty  Household Mgmt: Independent and without difficulty Cooking/Meal Preparation: Independent and without difficulty.  Shopping: Independent and without difficulty.  Appointment Mgmt: Independent and without difficulty  Employment: Independent and without difficulty     Psychiatric/Neuropsychiatric Symptoms   Depression: not right now. Has felt some depression from time to time, "but who hasn't?"   Erica/Hypomania: no  Anxiety: yes - longstanding anxiety. "That's my middle name." Very protective of her children. Had some phobias when kids were little, like that they would fall through the toilet. No panic attacks. Taking Wellbutrin, Buspar, Zoloft   Stress: depending on, this is tax season so it's getting higher. Always feels like she is behind.  Social Withdrawal: no  Neurovegetative Sxs:  Appetite: " "stable. Was taking Adipex for weight loss and her shakes stopped. That's when the weight management said that she may need an official ADD diagnosis. They only let you take it for so long. She was thoroughly impressed. She quit taking it. Can not eat all day until 4 PM and then sometimes binging.   Sleep: For a while was having early morning awakening. Sometimes now goes the whole night and won't sleep. Will stay up at her daughters until 5 AM. Plays games on her ipad all the time. Has been told she talks in her sleep. Snores horribly and stopped wearing her CPAP machine. Doesn't think she acts out her dreams, but not sure if she was tossing and turning or what.   Energy: "zero" and blames her thyroid   Hallucinations: yes on propanolol only   Delusional/Paranoid Thinking: no  Impulsivity/Compulsivity:   Substance Over-use: no   Sexual Behaviors: no  Shopping/Spending: yes - believes she shops instead of getting into romantic relationships. Does not believe it's a problem. Believes that she can stop at any time. Just not saving as much as she would like.   Compulsive Eating: binges and sweets are her downfall. Breads are also her downfall. Quit them when she was doing the weight management program and did fine without eating them. Has slid back into eating them now.  Obsessive/Compulsive Behaviors: no  Disinhibition: always been very blunt. Daughter gets on to her that she is hurting peoples feelings. But has always been that way.   Irritability/Agitation: yes - feels her patience is thin at times. Not a change for her.   Aggression: no  Apathy/Indifference: no  Other changes in personality: no     Physical Functioning   She reports a b/l hand tremor since [age]yo slowly progressive over her lifetime L>R in the last few years. Started to have a vocal tremor and head tremor in the last years. At this point she can no longer put on makeup, cannot write. At times drops items. Struggles to type and double types.  At " times unsteady gait. Falls once a year.   Lightheadedness: every now and then. Crystals have come loose in her ears twice and they had to reseat them twice.   Urinary urgency: a little bit, but no accidents.   Sensory Sxs: no  Pain: headache pain   Physical Exercise Routine: none     RELEVANT HISTORY  This patient has no past medical history on file.    No past surgical history on file.    Neurological History    Headaches/Migraines: yes - chronic headaches and migraines.   TBI: no  Seizures: no  Stroke: no  Tumor: no   Previous Episodes of Delirium: no  Movement Disorder: essential tremor  CNS Infection: no  Other: no       Neurodiagnostics   No results found for this or any previous visit.    Pertinent Lab Work   No results found for: [id number]  No results found for: RPR  No results found for: FOLATE  Lab Results   Component Value Date    TSH 0.155 (L) 02/02/2023     No results found for: LABA1C, HGBA1C  No results found for: HIV1X2, [id number]    Medications     Current Outpatient Medications   Medication Instructions    baclofen (LIORESAL) 10 mg, Oral, Nightly    buPROPion (WELLBUTRIN XL) 300 mg, Oral, Daily PRN    busPIRone (BUSPAR) 7.5-15 mg, Oral    butalbital-acetaminophen-caffeine -40 mg (FIORICET, ESGIC) -40 mg per tablet 1-2 tablets, Oral, 3 times daily PRN    carvediloL (COREG) 12.5 mg, Oral, 2 times daily    ergocalciferol (ERGOCALCIFEROL) 50,000 Units, Oral, Every 7 days    famotidine (PEPCID) 20 mg, Oral    levothyroxine (SYNTHROID) 112 mcg, Oral    primidone (MYSOLINE) 50 mg, Oral, 2 times daily    rosuvastatin (CRESTOR) 10 mg, Oral, Daily    sertraline (ZOLOFT) 100 mg, Oral, Daily    ubidecarenone (COENZYME Q10 ORAL) 2 tablets, Oral, Daily     Psychiatric History   Prior Diagnoses: a couple episodes of depression always in reaction to something big happening in her life   History of Suicide Attempts: no  Current Ideation, Intention, or Plan: no  Homicidal Ideation: no Medication(s):  Taking Wellbutrin, Buspar, Zoloft  Hospitalization(s): no  Psychotherapy/Counseling: did some counseling after her divorce  Other: no     Substance Use History     Social History     Tobacco Use    Smoking status: Former     Types: Cigarettes    Smokeless tobacco: Not on file   Substance and Sexual Activity    Alcohol use: Not on file    Drug use: Not on file    Sexual activity: Not on file     History of abuse/overuse: Quit smoking at 52 years old. They put her on Chantix for a short period of time. Honey bourbon stops her tremor. 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     Family Neurological & Psychiatric History     No family history on file.  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 2 children. 7 grandchildren and 1 great grandchild  Support System: good - family and friends  Hobbies/Activities: going to stuff for her grands and plays Bunco with her friends once a month   Current Living Situation: lives at home with her dog       OBJECTIVE:     MENTAL STATUS AND OBSERVATIONS:   Appearance: Casually dressed and adequate grooming/hygiene.   Alertness: Attentive and alert.   Orientation:   O x 4    Gait:  Unable to assess   Psychomotor:  Unable to assess   Handedness:  Right   Vision & Hearing:  Adequate for session   Speech/language: Normal in rate, rhythm, tone, and volume. No significant word finding difficulty observed.  Comprehension was normal.   Mood/Affect:  The patients mood and affect were congruent and euthymic.    Interpersonal Behavior:  Rapport was quickly and easily established    Suicidality/Homicidality: Denied   Hallucinations/Delusions:  None evidenced or endorsed   Thought Content: Logical   Though Processes: Goal-directed   Insight & Judgment:  Appropriate   Participation in Interview:  Full     PROCEDURES/TESTS ADMINISTERED: Performed a review of pertinent medical records, reviewed limits to confidentiality, conducted a clinical interview, and explained procedures.

## 2023-02-15 NOTE — TELEPHONE ENCOUNTER
----- Message from Zainab Shea MD sent at 2/15/2023  4:17 PM CST -----  Regarding: MRI switch  EJ can you help order the correct brain MRI to avoid the insurance denials you mentioned?  My team can you help cancel the brain mri but leave the neck mri?

## 2023-02-20 ENCOUNTER — PATIENT MESSAGE (OUTPATIENT)
Dept: NEUROLOGY | Facility: CLINIC | Age: 68
End: 2023-02-20
Payer: MEDICARE

## 2023-02-22 ENCOUNTER — TELEPHONE (OUTPATIENT)
Dept: NEUROLOGY | Facility: CLINIC | Age: 68
End: 2023-02-22
Payer: MEDICARE

## 2023-02-22 NOTE — TELEPHONE ENCOUNTER
Called [facility] per Dr. Shea's request at their extension [phone] to attempt to get pt's brain MRI status from scheduled to order. The line was quite busy, though, so I will try again later.

## 2023-02-22 NOTE — TELEPHONE ENCOUNTER
Attempted again to reach out to [facility] per request of Dr. Shea at their ext [phone], but they were again busy. I will attempt again at another extension

## 2023-02-22 NOTE — TELEPHONE ENCOUNTER
Attempted another time to reach [facility]. Reached MRI  Brina to help us switch the orders around. Going to keep the appt the same but switch which order it is on.

## 2023-02-22 NOTE — TELEPHONE ENCOUNTER
Called pt regarding coming in for DBS testing and paperwork before MRI visit. They stated that they do not have a DBS device but are a DBS candidate. I apologized as I read the chart incorrectly, so I informed them that they should be good for the MRI without coming in before.

## 2023-02-27 ENCOUNTER — HOSPITAL ENCOUNTER (OUTPATIENT)
Dept: RADIOLOGY | Facility: HOSPITAL | Age: 68
Discharge: HOME | End: 2023-02-27
Attending: PSYCHIATRY & NEUROLOGY
Payer: MEDICARE

## 2023-02-27 ENCOUNTER — OFFICE VISIT (OUTPATIENT)
Dept: NEUROLOGY | Facility: CLINIC | Age: 68
End: 2023-02-27
Payer: MEDICARE

## 2023-02-27 DIAGNOSIS — G25.0 ESSENTIAL TREMOR: ICD-10-CM

## 2023-02-27 DIAGNOSIS — R41.9 COGNITIVE COMPLAINTS: Primary | ICD-10-CM

## 2023-02-27 DIAGNOSIS — F41.9 ANXIETY: ICD-10-CM

## 2023-02-27 PROCEDURE — 99999 PR PBB SHADOW E&M-EST. PATIENT-LVL II: CPT | Mod: PBBFAC,,, | Performed by: CLINICAL NEUROPSYCHOLOGIST

## 2023-02-27 PROCEDURE — 99499 NO LOS: ICD-10-PCS | Mod: S$PBB,,, | Performed by: CLINICAL NEUROPSYCHOLOGIST

## 2023-02-27 PROCEDURE — 99999 PR PBB SHADOW E&M-EST. PATIENT-LVL II: ICD-10-PCS | Mod: PBBFAC,,, | Performed by: CLINICAL NEUROPSYCHOLOGIST

## 2023-02-27 PROCEDURE — 72141 MRI NECK SPINE W/O DYE: CPT | Mod: TC

## 2023-02-27 PROCEDURE — 70553 MRI BRAIN STEM W/O & W/DYE: CPT | Mod: 26,,, | Performed by: RADIOLOGY

## 2023-02-27 PROCEDURE — 96132 NRPSYC TST EVAL PHYS/QHP 1ST: CPT | Mod: ,,, | Performed by: CLINICAL NEUROPSYCHOLOGIST

## 2023-02-27 PROCEDURE — 96139 PR PSYCH/NEUROPSYCH TEST ADMIN/SCORING, BY TECH, 2+ TESTS, EA ADDTL 30 MIN: ICD-10-PCS | Mod: ,,, | Performed by: CLINICAL NEUROPSYCHOLOGIST

## 2023-02-27 PROCEDURE — 25500020 PHARM REV CODE 255: Performed by: PSYCHIATRY & NEUROLOGY

## 2023-02-27 PROCEDURE — A9585 GADOBUTROL INJECTION: HCPCS | Performed by: PSYCHIATRY & NEUROLOGY

## 2023-02-27 PROCEDURE — 96138 PSYCL/NRPSYC TECH 1ST: CPT | Mod: ,,, | Performed by: CLINICAL NEUROPSYCHOLOGIST

## 2023-02-27 PROCEDURE — 96139 PSYCL/NRPSYC TST TECH EA: CPT | Mod: ,,, | Performed by: CLINICAL NEUROPSYCHOLOGIST

## 2023-02-27 PROCEDURE — 99499 UNLISTED E&M SERVICE: CPT | Mod: S$PBB,,, | Performed by: CLINICAL NEUROPSYCHOLOGIST

## 2023-02-27 PROCEDURE — 70553 MRI BRAIN W WO CONTRAST: ICD-10-PCS | Mod: 26,,, | Performed by: RADIOLOGY

## 2023-02-27 PROCEDURE — 72141 MRI CERVICAL SPINE WITHOUT CONTRAST: ICD-10-PCS | Mod: 26,,, | Performed by: RADIOLOGY

## 2023-02-27 PROCEDURE — 96133 PR NEUROPSYCHOLOGIC TEST EVAL SVCS, EA ADDTL HR: ICD-10-PCS | Mod: ,,, | Performed by: CLINICAL NEUROPSYCHOLOGIST

## 2023-02-27 PROCEDURE — 70553 MRI BRAIN STEM W/O & W/DYE: CPT | Mod: TC

## 2023-02-27 PROCEDURE — 99212 OFFICE O/P EST SF 10 MIN: CPT | Mod: PBBFAC,25 | Performed by: CLINICAL NEUROPSYCHOLOGIST

## 2023-02-27 PROCEDURE — 96138 PR PSYCH/NEUROPSYCH TEST ADMIN/SCORING, BY TECH, 2+ TESTS, 1ST 30 MIN: ICD-10-PCS | Mod: ,,, | Performed by: CLINICAL NEUROPSYCHOLOGIST

## 2023-02-27 PROCEDURE — 96132 PR NEUROPSYCHOLOGIC TEST EVAL SVCS, 1ST HR: ICD-10-PCS | Mod: ,,, | Performed by: CLINICAL NEUROPSYCHOLOGIST

## 2023-02-27 PROCEDURE — 96133 NRPSYC TST EVAL PHYS/QHP EA: CPT | Mod: ,,, | Performed by: CLINICAL NEUROPSYCHOLOGIST

## 2023-02-27 PROCEDURE — 72141 MRI NECK SPINE W/O DYE: CPT | Mod: 26,,, | Performed by: RADIOLOGY

## 2023-02-27 RX ORDER — GADOBUTROL 604.72 MG/ML
10 INJECTION INTRAVENOUS
Status: COMPLETED | OUTPATIENT
Start: 2023-02-27 | End: 2023-02-27

## 2023-02-27 RX ADMIN — GADOBUTROL 10 ML: 604.72 INJECTION INTRAVENOUS at 05:02

## 2023-02-27 NOTE — PROGRESS NOTES
NEUROPSYCHOLOGICAL EVALUATION - CONFIDENTIAL    Referring Provider: Zainab Shea MD   Medical Necessity: Evaluate cognitive and emotional functioning, participate in treatment planning/management, and provide supportive therapy to help assess candidacy for deep brain stimulation treatment in the setting of essential tremor  Date Conducted: 2/15/2023 & 2/27/2023  Present At Visit: the patient   Referral Diagnoses: G25.0 (ICD-10-CM) - Essential tremor  Consent: The patient expressed an understanding of the purpose of the evaluation and consented to all procedures. We discussed the limits of confidentiality and discussed an emergency plan.    ASSESSMENT & PLAN   Ms. Anna Wagoner is an 67 y.o., right-handed, female with 12 years of education who was referred for a neuropsychological evaluation to help assess candidacy for deep brain stimulation treatment (DBS) in the setting of essential tremor.     The following factors should be considered when planning/discussing surgical candidacy:    Procedure Understanding/Capacity  The patient could describe her goals for the surgery, post-surgery supported needed, and costs/benefits/risks associated with the procedure. No issues/concerns regarding ability to understand and/or consent to treatment.    Ability to Manage Pre/Monique/Post Operative Treatment  No concerns. Good support system to help manage.      Cognitive Functioning  Compared to average range premorbid estimates (based on both demographic information and a word reading test), results of the current evaluation reveal an intact and at expectation cognitive profile. Working memory and executive functioning are relative strengths. She does not meet criteria for any neurocognitive disorder diagnoses.     Average risk for post-surgical cognitive decline.    Of note, her medication list reveals a high risk of anticholingeric burden due to the following combination of medications: Baclofen, Wellbutrin, and Zoloft.  "    Psychiatric/Neuropsychiatric Considerations  Anxiety - longstanding without any recent worsening.   Moderate symptoms of clinically significant anxiety on screening questionnaires.   Reports some history of over-shopping and over-eating but believes she can stop herself if she needed to.   Hallucinations while taking propanolol.   Mild degree of clinically significant depressive symptoms on screening questionnaires.     No contraindications to surgical intervention, but will alert team of findings.     Problem List Items Addressed This Visit          Neuro    Essential tremor       Psychiatric    Anxiety     Other Visit Diagnoses       Cognitive complaints    -  Primary            Thank you for allowing me to assist in Ms. Anna Wagoner's care. If you have any questions, please contact me at [phone].      Sara Milton, PhD  Licensed Clinical Neuropsychologist  [facility] - Department of Neurology    CLINICAL INTERVIEW & RECORD REVIEW     Expectations for Surgery   [facility] two groups [facility] FB. [facility] and another DBS support group. Those were really helpful in learning more about the surgery.   Feels like she has not been interested in surgery for a while, but now has learned a lot and feels ready for it.   Surgery for tremor control.   It's noticeable that I don't do what others do on medications. Propanolol > had horrible hallucinations on that medication. Only doubt and fear is that my DBS will be perfectly done but my body will say no.   Would like surgery soon. Asking about possibly getting scheduled for April.     Cognitive Functioning   Cognitive screener: Recalls completing "30 minutes or so" of testing with Dr. Desai in [place]. By her description, sounds like a cognitive screener.   MoCA = 28/30 (July 2019)  MoCA = 27/30 (June 2021)  Previous evaluation(s): none  Onset & course of difficulty: Some age-related change but has noticed mild changes over the last 5 years " "that have remained stable since onset.   Fluctuations: none  Examples:   Attention/Working Memory/Executive Functioning: losing her train of thought, easily distracted. Feels she can't do mental math as fast as she used to, but blames reliance on calculators   Processing Speed: no problems identified   Language: every now and then has an issue finding a word  Visuospatial: no problems identified   Learning & Memory: used to know phone numbers by heart after the first time dialing. Used to know SS numbers by heart. Blames the cell phone because doesn't used her memory the same way.   Exacerbating factors: none  Ameliorating factors: none  Medication for cognition: none     Daily Functioning   ADLs:    Bathing: Independent and without difficulty  Dressing: Independent and without difficulty  Grooming: Independent and without difficulty   Toileting: Independent and without difficulty  Transferring: Independent and without difficulty.  Eating: Independent and without difficulty.   IADLs:    Finances: Independent and without difficulty  Medication Mgmt: Independent and without difficulty  Driving: Independent and without difficulty  Household Mgmt: Independent and without difficulty Cooking/Meal Preparation: Independent and without difficulty.  Shopping: Independent and without difficulty.  Appointment Mgmt: Independent and without difficulty  Employment: Independent and without difficulty     Psychiatric/Neuropsychiatric Symptoms   Depression: not right now. Has felt some depression from time to time, "but who hasn't?"   Erica/Hypomania: no  Anxiety: yes - longstanding anxiety. "That's my middle name." Very protective of her children. Had some phobias when kids were little, like that they would fall through the toilet. No panic attacks. Taking Wellbutrin, Buspar, Zoloft   Stress: depending on, this is tax season so it's getting higher. Always feels like she is behind.  Social Withdrawal: no  Neurovegetative " "Sxs:  Appetite: stable. Was taking Adipex for weight loss and her shakes stopped. That's when the weight management said that she may need an official ADD diagnosis. They only let you take it for so long. She was thoroughly impressed. She quit taking it. Can not eat all day until 4 PM and then sometimes binging.   Sleep: For a while was having early morning awakenings. Sometimes now goes the whole night and won't sleep. Will stay up at her daughters until 5 AM. Plays games on her ipad all the time. Has been told she talks in her sleep. Snores horribly and stopped wearing her CPAP machine. Doesn't think she acts out her dreams, but not sure if she was tossing and turning or what.   Energy: "zero" and blames her thyroid   Hallucinations: yes on propanolol only   Delusional/Paranoid Thinking: no  Impulsivity/Compulsivity:   Substance Over-use: no   Sexual Behaviors: no  Shopping/Spending: yes - believes she shops instead of getting into romantic relationships. Does not believe it's a problem. Believes that she can stop at any time. Just not saving as much as she would like.   Compulsive Eating: binges and sweets are her downfall. Breads are also her downfall. Quit them when she was doing the weight management program and did fine without eating them. Has slid back into eating them now.  Obsessive/Compulsive Behaviors: no  Disinhibition: always been very blunt. Daughter gets on to her that she is hurting peoples feelings. But has always been that way.   Irritability/Agitation: yes - feels her patience is thin at times. Not a change for her.   Aggression: no  Apathy/Indifference: no  Other changes in personality: no     Physical Functioning   She reports a b/l hand tremor since 9-13yo slowly progressive over her lifetime L>R in the last few years. Started to have a vocal tremor and head tremor in the last years. At this point she can no longer put on makeup, cannot write. At times drops items. Struggles to type and " double types.  At times unsteady gait. Falls once a year. Lightheadedness: every now and then. Crystals have come loose in her ears twice and they had to reseat them twice.   Urinary urgency: a little bit, but no accidents.   Sensory Sxs: no  Pain: headache pain   Physical Exercise Routine: none     RELEVANT HISTORY  This patient has a past medical history of Anxiety, Hashimoto's thyroiditis, Hypertension, and Neck pain, chronic.    Past Surgical History:   Procedure Laterality Date    BREAST SURGERY      CHOLECYSTECTOMY      HYSTERECTOMY      TONSILLECTOMY         Neurological History    Headaches/Migraines: yes - chronic headaches and migraines.   TBI: no  Seizures: no  Stroke: no  Tumor: no   Previous Episodes of Delirium: no  Movement Disorder: essential tremor  CNS Infection: no  Other: no       Neurodiagnostics   No results found for this or any previous visit.    Pertinent Lab Work   No results found for: DMOPURPD97  No results found for: RPR  No results found for: FOLATE  Lab Results   Component Value Date    TSH 0.155 (L) 02/02/2023     No results found for: LABA1C, HGBA1C  No results found for: HIV1X2, ATK04RYII    Medications     Current Outpatient Medications   Medication Instructions    baclofen (LIORESAL) 10 mg, Oral, Nightly    buPROPion (WELLBUTRIN XL) 300 mg, Oral, Daily PRN    busPIRone (BUSPAR) 7.5-15 mg, Oral    butalbital-acetaminophen-caffeine -40 mg (FIORICET, ESGIC) -40 mg per tablet 1-2 tablets, Oral, 3 times daily PRN    carvediloL (COREG) 12.5 mg, Oral, 2 times daily    ergocalciferol (ERGOCALCIFEROL) 50,000 Units, Oral, Every 7 days    famotidine (PEPCID) 20 mg, Oral    levothyroxine (SYNTHROID) 112 mcg, Oral    primidone (MYSOLINE) 50 mg, Oral, 2 times daily    rosuvastatin (CRESTOR) 10 mg, Oral, Daily    sertraline (ZOLOFT) 100 mg, Oral, Daily    ubidecarenone (COENZYME Q10 ORAL) 2 tablets, Oral, Daily     Psychiatric History   Prior Diagnoses: a couple episodes of  depression always in reaction to something big happening in her life   History of Suicide Attempts: no  Current Ideation, Intention, or Plan: no  Homicidal Ideation: no Medication(s): Taking Wellbutrin, Buspar, Zoloft  Hospitalization(s): no  Psychotherapy/Counseling: did some counseling after her divorce  Other: no     Substance Use History     Social History     Tobacco Use    Smoking status: Former     Types: Cigarettes    Smokeless tobacco: Not on file   Substance and Sexual Activity    Alcohol use: Not on file    Drug use: Not on file    Sexual activity: Not on file     History of abuse/overuse: Quit smoking at 52 years old. They put her on Chantix for a short period of time. Honey bourbon stops her tremor. 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     Family Neurological & Psychiatric History       Family History   Problem Relation Age of Onset    Tremor Mother      Neurologic: Tremor (son)  Psychiatric: alcoholism (father)       Development  Education   Born & raised: moved around a lot due to father being in the    Prenatal and  development: wnl, but weighed 5 pounds and needed a transfusion because she is RH negative  Developmental milestones: wnl  Language Acquisition: English first language  Level Attained: HS  Learning/Attention/Behavior Difficulties: no  Repeated Grade(s): no  Typical Grades: made good grades in school        Occupation  Social   Occupational Status: Working Full-time   Primary Occupation: Accounting and Bookkeeping   Family Status: . 2 children. 7 grandchildren and 1 great grandchild  Support System: good - family and friends  Hobbies/Activities: going to stuff for her grands and plays Bunco with her friends once a month   Current Living Situation: lives at home with her dog       OBJECTIVE:     MENTAL STATUS AND OBSERVATIONS:   Appearance: Casually dressed and adequate grooming/hygiene.   Alertness: Attentive and alert.   Orientation:   O x 4 across  both evaluation days   Gait:  Independent    Psychomotor:  She had a bilateral tremor (LH worse than RH). Her voice and head also tremored.     Handedness:  Right   Vision & Hearing:  Adequate for session   Speech/language: Normal in rate, rhythm, tone, and volume. No significant word finding difficulty observed. Comprehension was normal during the clinical interview. She asked for repetition and clarification of complex task instructions to ensure her comprehension during testing.    Mood/Affect:  The patients mood and affect were congruent and euthymic.    Interpersonal Behavior:  Rapport was quickly and easily established    Suicidality/Homicidality: Denied   Hallucinations/Delusions:  None evidenced or endorsed   Thought Content: Logical   Though Processes: Goal-directed   Insight & Judgment:  Appropriate   Participation in Interview:  Full     PROCEDURES/TESTS ADMINISTERED: In addition to performing a review of pertinent medical records, reviewing limits to confidentiality, conducting a clinical interview, and explaining procedures, the following measures were administered by NING Soni, a trained psychometrician/psychometrist under the direct supervision of Dr. Milton: MSVT; Test of Premorbid Functioning (TOPF); Wechsler Adult Intelligence Scale, Fourth Edition (WAIS-IV) [Digit Span and Arithmetic subtests]; Symbol Digit Modalities Test (SDMT); Wechsler Memory Scale, Fourth Edition (WMS-IV) [Logical Memory subtest]; Taylor Verbal Learning Test-Revised (HVLT-R; Form 1); Brief Visuospatial Memory Test-Revised (BVMT-R, form 1); Neuropsychological Assessment Battery (NAB) [Naming subtest, form 1]; Verbal fluency tests (FAS & animal naming; Ye et al., 2004 norms); qualitative language-related screening tasks; Luis Complex Figure Test (RCFT) [copy only]; Trail Making Test, parts A and B (Ye et al., 2004 norms); [place] Card Sorting Test -64 card version (WCST-64); Geriatric Depression Scale  "(GDS-30); and Generalized Anxiety Disorder - 7 Item Scale (COLLIN-7). Manual norms were used unless otherwise indicated.      TEST TAKING BEHAVIOR AND VALIDITY: Ms. Wagoner reported that she was not looking forward to testing. She elaborated, stating that she is hard on herself when she does not do well on something. She fidgeted throughout testing and made self-critical comments about her performances, such as "I'm not an artist."  While completing questionnaires she stated, "I don't know how to relax."  Overall, she worked at average pace and persevered throughout the evaluation. Scores on stand-alone and embedded performance validity measures were within normal limits. The current results, therefore, are likely an accurate reflection of the patient's current functioning.    TEST RESULTS    Raw Score Type of Standardized Score Standardized Score Percentile/CP Descriptor   MSVT  - - - -   MSVT  - - - -   MSVT Cons 100 - - - -   MSVT  - - - -   MSVT FR 70 - - - -   ACS LM II Rec 26 - - - -   ACS RDS 10 - - - -   HVLT-R Recognition Discrimination 11 - - - -   PREMORBID FUNCTIONING Raw Score Type of Standardized Score Standardized Score Percentile/CP Descriptor   TOPF simple dem. eFSIQ -  50 Average   TOPF pred. eFSIQ -  55 Average   TOPF simple + pred. eFSIQ -  53 Average   LANGUAGE FUNCTIONING Raw Score Type of Standardized Score Standardized Score Percentile/CP Descriptor   TOPF Word Reading 101 SS 99 47 Average   NAB Naming 31 Tscore 58 79 High Average   FAS 39 Tscore 50 50 Average   Animal Naming 26 Tscore 65 93 Above Average   Finger Gnosis - Right Hand (DH) 10/10 - - - WNL   Finger Gnosis - Left Hand (NDH) 10/10 - - - WNL   Right/Left Discrimination 4/4 - - - WNL   Repetition 5/5 - - - WNL   Writing Sample 0 errors - - - WNL   Reading Sample  0 errors - - - WNL   Praxis - Right Hand (DH) 0 errors - - - WNL   Praxis - Left Hand (NDH) 0 errors - - - WNL   VISUOSPATIAL FUNCTIONING " Raw Score Type of Standardized Score Standardized Score Percentile/CP Descriptor   RCFT Copy 31 - - >16 WNL   RCFT Time to Copy 227 - - >16 WNL   BVMT-R Copy 12 - - - -   LEARNING & MEMORY Raw Score Type of Standardized Score Standardized Score Percentile/CP Descriptor   HVLT-R         Total Immediate (6, 10, 12) 28 Tscore 53 62 Average   Delayed Recall 11 Tscore 58 79 High Average   Retention % 92 Tscore 51 54 Average   Hits 12 - - - -   False Positives 1 - - - -   Discrimination  11 Tscore 52 58 Average   WMS-IV Subtests         LM I 36 ss 14 91 Above Average   LM II 36 ss 16 98 Exceptionally High   LM Recognition 26 - - >75 High Average   BVMT-R         IR (5, 8, 9) 22 Tscore 51 54 Average   DR 9 Tscore 53 62 Average   Discrimination Index 6 - - >16 WNL   ATTENTION/WORKING MEMORY Raw Score Type of Standardized Score Standardized Score Percentile/CP Descriptor   WAIS-IV WMI -  77 High Average   WAIS-IV Digit Span 30 ss 12 75 High Average         DS Forward 10 ss 10 50 Average         DS Backward 10 ss 12 75 High Average         DS Sequence 10 ss 13 84 High Average         Longest Digit Forward 7 - - - -         Longest Digit Backward 7 - - - -         Longest Digit Sequence 7 - - - -   WAIS-IV Arithmetic 16 ss 12 75 High Average   MENTAL PROCESSING SPEED Raw Score Type of Standardized Score Standardized Score Percentile/CP Descriptor   SMDT Written 38 zscore 0.52 70 Average   SMDT Oral 52 zscore 0.88 81 High Average   TMT A  27 Tscore 57 76 High Average   TMT A errors 1 - - - -   EXECUTIVE FUNCTIONING Raw Score Type of Standardized Score Standardized Score Percentile/CP Descriptor   TMT B 56 Tscore 60 84 High Average   TMT B errors 0 - - - -   WCST-64         Total Correct 58 SS - - -   Total Errors 6  99.7 Exceptionally High   Perseverative Resp. 3 SS >145 >99.9 Exceptionally High   Perseverative Err. 3 SS >145 >99.9 Exceptionally High   Nonperseverative Err. 3  87 High Average   Concept. Level  Response 57  99.6 Exceptionally High   Categories Completed 5 - - >16 WNL   FMS 10 - - >16 WNL   Learning to Learn -2.08 - - >16 WNL   MOOD & PERSONALITY Raw Score Type of Standardized Score Standardized Score Percentile/CP Descriptor   GDS-30 12 - - - Mild   COLLIN-7 11 - - - Moderate   ss = scaled score (mean = 10, SD = 3); SS = standard score (mean = 100, SD = 15); Tscore mean = 50, SD = 10; zscore (mean = 0.00, SD = 1)  It is important to note that scores/percentiles should only be interpreted by a neuropsychologist. It is common for healthy individuals to have 1-3 isolated low/unusual scores that are not indicative of any significant cognitive dysfunction.       BILLING  Code Description Minutes Units   45073 Psychiatric Interview 0    14262 Nubhvl xm phys/qhp 1st hr 0    94754 Nubhvl xm phy/qhp ea addl hr 0    67611 Psycl tst eval phys/qhp 1st 0    37863 Psycl tst eval phys/qhp ea 0    94268 Nrpsyc tst eval phys/qhp 1st 60 1   63047 Nrpsyc tst eval phys/qhp ea 97 2     Referral review/test selection 30      Tech consult/test review/modifications 10      Patient limitation management 0      Patient behavior management 0      Patient symptom monitoring 0      Record Review/Integration/Report Generation 86      Face-to-Face interpretive Feedback 31    96951 Psycl/nrpsyc tst phy/qhp 1st 0    87515 Psycl/nrpsyc tst phy/qhp ea 0    51848 Psycl/nrpsyc tech 1st 30 1   57710 Psycl/nrpsyc tst tech ea 153 5

## 2023-03-02 ENCOUNTER — PATIENT MESSAGE (OUTPATIENT)
Dept: NEUROLOGY | Facility: CLINIC | Age: 68
End: 2023-03-02
Payer: MEDICARE

## 2023-03-02 ENCOUNTER — OFFICE VISIT (OUTPATIENT)
Dept: NEUROSURGERY | Facility: CLINIC | Age: 68
End: 2023-03-02
Payer: MEDICARE

## 2023-03-02 ENCOUNTER — TELEPHONE (OUTPATIENT)
Dept: NEUROSURGERY | Facility: CLINIC | Age: 68
End: 2023-03-02
Payer: MEDICARE

## 2023-03-02 DIAGNOSIS — M48.02 CERVICAL SPINAL STENOSIS: ICD-10-CM

## 2023-03-02 DIAGNOSIS — M48.02 SPINAL STENOSIS, CERVICAL REGION: ICD-10-CM

## 2023-03-02 DIAGNOSIS — G25.0 ESSENTIAL TREMOR: Primary | ICD-10-CM

## 2023-03-02 PROCEDURE — 99205 OFFICE O/P NEW HI 60 MIN: CPT | Mod: 95,,, | Performed by: NEUROLOGICAL SURGERY

## 2023-03-02 PROCEDURE — 99205 PR OFFICE/OUTPT VISIT, NEW, LEVL V, 60-74 MIN: ICD-10-PCS | Mod: 95,,, | Performed by: NEUROLOGICAL SURGERY

## 2023-03-02 NOTE — TELEPHONE ENCOUNTER
Called pt to assist w/ logging in for virtual = virtual log in successful   I provided detailed instructions prior to today's visit.

## 2023-03-02 NOTE — PROGRESS NOTES
"Neurosurgery  History & Physical    SUBJECTIVE:     Chief Complaint: essential tremor     History of Present Illness:  Anna Wagoner is a 67 y.o. right-handed female referred by Dr. Shea for consideration of DBS for essential tremor. She presents today virtually.     Tremor began at age 11, like a "flutter;" it is exacerbated by her being upset. It has been progressive over time. She finds that she drops things and has balance difficulties. Sometimes she drops things independent of the tremor. She has a difficult time typing. She has a hard time walking far distances as a result of her neck and back pain. She endorses chronic pain. She has trouble climbing stairs.     She had hallucinations on propranolol; primidone doesn't particularly help her tremor. She has head, voice, and left hand tremor.     She enjoys playing on the ipad or playing with her children and grandchildren.     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 She has her high school diploma.     Goals for surgery: to improve and minimize the tremor as much as possible. To be able to move better and exercise. To improve her handwriting.     She lives alone with her dog. Daughter Candice, who joins her today, will help to care for her perioperatively.     The patient denies any bleeding, infectious, or anesthetic complications with any previous surgery. No AC/AP agents.     Review of patient's allergies indicates:   Allergen Reactions    Metoprolol succinate Hives and Itching     Hives - wheals       Current Outpatient Medications   Medication Sig Dispense Refill    baclofen (LIORESAL) 10 MG tablet Take 1 tablet (10 mg total) by mouth every evening. 30 tablet 11    buPROPion (WELLBUTRIN XL) 300 MG 24 hr tablet Take 300 mg by mouth daily as needed.      busPIRone (BUSPAR) 15 MG tablet Take 7.5-15 mg by mouth.      butalbital-acetaminophen-caffeine -40 mg (FIORICET, ESGIC) -40 mg per tablet Take 1-2 tablets by mouth 3 (three) " times daily as needed.      carvediloL (COREG) 12.5 MG tablet Take 12.5 mg by mouth 2 (two) times daily.      ergocalciferol (ERGOCALCIFEROL) 50,000 unit Cap Take 50,000 Units by mouth every 7 days.      famotidine (PEPCID) 20 MG tablet Take 20 mg by mouth.      levothyroxine (SYNTHROID) 112 MCG tablet Take 112 mcg by mouth.      primidone (MYSOLINE) 50 MG Tab Take 1 tablet (50 mg total) by mouth 2 (two) times a day. 60 tablet 11    rosuvastatin (CRESTOR) 10 MG tablet Take 10 mg by mouth once daily.      sertraline (ZOLOFT) 100 MG tablet Take 100 mg by mouth once daily.      ubidecarenone (COENZYME Q10 ORAL) Take 2 tablets by mouth once daily.       No current facility-administered medications for this visit.       Past Medical History:   Diagnosis Date    Anxiety     Hashimoto's thyroiditis     Hypertension     Neck pain, chronic      Past Surgical History:   Procedure Laterality Date    BREAST SURGERY      CHOLECYSTECTOMY      HYSTERECTOMY      TONSILLECTOMY       Family History       Problem Relation (Age of Onset)    Tremor Mother          Social History     Socioeconomic History    Marital status:    Tobacco Use    Smoking status: Former     Types: Cigarettes       Review of Systems   Constitutional:  Negative for fever.   HENT:  Negative for nosebleeds.    Eyes:  Negative for visual disturbance.   Respiratory:  Negative for shortness of breath.    Cardiovascular:  Negative for chest pain.   Gastrointestinal:  Positive for constipation.   Endocrine: Positive for heat intolerance.   Genitourinary:  Negative for difficulty urinating.   Musculoskeletal:  Negative for neck pain.   Skin:  Negative for color change.   Neurological:  Positive for tremors.   Hematological:  Does not bruise/bleed easily.   Psychiatric/Behavioral:  Positive for dysphoric mood. The patient is nervous/anxious.         Chronic pain     OBJECTIVE:     Vital Signs     There is no height or weight on file to calculate  BMI.      Physical Exam:    Constitutional: She appears well-developed and well-nourished. No distress.     Eyes: EOM are normal.     Skin: Skin displays no rash on extremities. Skin displays no lesions on extremities.     Psych/Behavior: She is alert. She is oriented to person, place, and time.     Musculoskeletal:      Comments: No focal weakness on video visit      Neurological:   L > R hand tremor   Gait normal   Voice tremor      Pulmonary: nonlabored respirations     Hematologic: no bruising noted       Diagnostic Results:  MRI personally reviewed   Cervical stenosis C3-C4   Loss of cervical lordosis   ASSESSMENT/PLAN:     Anna Wagoner is a 67 y.o. female who presents as a referral from Dr. Shea for consideration of DBS therapy for Parkinson disease.   She will still need to be discussed further in interdisciplinary conference, and she will need MRI of the brain DBS protocol, but I suspect he would best benefit from left brain DBS first to benefit right side, since she is right handed.      We had a lengthy discussion about the risks, benefits, and alternatives to deep brain stimulation.  Risks include, but are not limited to, bleeding, pain, infection, scarring, need for further/repeat procedure, failure to slow the progression of neurodegenerative disease symptoms, speech difficulty, balance difficulty, cognitive changes, loss of inhibition, intracranial hemorrhage, venous infarct, seizure, stroke, paralysis, loss of the ability to swim, and death. Hardware-related complications may also occur.  This is an implanted device, meaning that any infection elsewhere in the body mass be treated immediately to minimize the risk of blood stream infection seeding the device. Should this happen, it is possible that the entire system would require removal. We also discussed that tremor may become refractory to stimulation after a period of time.      We also reviewed the work flow employed at [facility] for placement  of deep brain stimulation devices.  We discussed that she would be admitted on a Tuesday morning for placement of 5 fiducial screws.  We clarified this would require shaving the entire head.  After the fiducial screws are placed, the patient would be transferred to CT scan to obtain a fiducial registration CT scan.  This would be merged with the already obtained MRI.  The patient would be brought back to the operating room for definitive placement of the DBS lead.  This is to be done while awake. The patient expressed understanding thereof. The following week, the patient would be readmitted for asleep implantation of extension cable and pulse generator on an outpatient basis.  The device would subsequently be programmed by our movement disorders colleagues.     The patient notes that she has no preference for generator side.      She will need to have MRI of the brain, DBS protocol, prior to surgery. We also discussed that she will meet with my , Dr. Bonifacio Kraft, prior to surgery.      However, I am also concerned about cervical spinal stenosis given the MRI and the patient's complaints that sound clinically consistent with myelopathy. I would like to obtain a CT cervical spine and flexion/extension to assess whether there is any OPLL or dynamic instability. I would like to examine the patient in person to assess whether she will require cervical decompression prior to proceeding with DBS. She is agreeable.     I have encouraged the patient to contact the clinic in interim with any questions, concerns, or adverse clinical change.      The patient location is: in [place]   The chief complaint leading to consultation is: tremor     Visit type: audiovisual    Face to Face time with patient: 45  67 minutes of total time spent on the encounter, which includes face to face time and non-face to face time preparing to see the patient (eg, review of tests), Obtaining and/or reviewing separately obtained  history, Documenting clinical information in the electronic or other health record, Independently interpreting results (not separately reported) and communicating results to the patient/family/caregiver, or Care coordination (not separately reported).         Each patient to whom he or she provides medical services by telemedicine is:  (1) informed of the relationship between the physician and patient and the respective role of any other health care provider with respect to management of the patient; and (2) notified that he or she may decline to receive medical services by telemedicine and may withdraw from such care at any time.    Notes:       Note dictated with voice recognition software, please excuse any grammatical errors.

## 2023-03-02 NOTE — PATIENT INSTRUCTIONS
PRACTICE GOOD COGNITIVE HYGIENE  Engage in regular exercise, which increases alertness and arousal and can improve attention and focus.  Consider lower impact exercises, such as yoga or light walking. Try to exercise for at least 150 minutes per week  Get a good night's sleep, as this can enhance alertness and cognition.  Eat healthy foods and balanced meals. It is notable that research indicates certain nutrients may aid in brain function, such as B vitamins (especially B6, B12, and folic acid), antioxidants (such as vitamins C and E, and beta carotene), and Omega-3 fatty acids. Here are some common tips for diet (Adopted from Yari et al, [facility], 2018):  Eat primarily plant-based foods, such as fruits and vegetables, whole grains, legumes   (beans) and nuts.  Limit refined carbohydrates (white pasta, bread, rice).  Replace butter with healthy fats such as olive oil.  Use herbs and spices instead of salt to flavor foods.  Limit red meat and processed meats to no more than a few times a month.  Avoid sugary sodas, bakery goods, and sweets.  Eat fish and poultry at least twice a week.  Keep your brain active. Find activities to stay mentally active, such as reading, games (cards, checkers), puzzles (crosswords, Sudoku, jig saw), crafts (models, woodworking), gardening, or participating in activities in the community.  Stay socially engaged. Continue staying active with your family and friends.    RESOURCE  Consider purchasing the book, High-Octane Brain: 5 Science-Based Steps to Sharpen Your Memory and Reduce Your Risk of Alzheimer's by Dr. Renetta Adkins.    BRAIN TRAINING APPS  · Brain[employer]le[employer] (https://www.brainWorlize.Mandae/) - BrainbewarketQ has more than two dozen brain-training exercises organized into six categories: Attention, Brain Speed, Memory, People Skills, Intelligence, and Navigation. It allows you to fit brain exercises into your busy life, and access brain training on most internet-connected devices. Plus, each exercise  continuously adapts to your unique performance. So you train at the right level for you.     · Mind Games (https://www.mindgames.com/) - Mind Games is a great collection of games based in part on principles derived from cognitive tasks to help you practice different mental skills. This includes a handful of free games. Additionally, there are a number of trial games included that can be played 3 times. All games include your score history and a graph of your progress. Using some principles of standardized testing, your scores are also converted to a standard scale so that you can see where you need work and excel. The training center does the work for you by picking the perfect mix of exercises to keep you engaged.     · Elevate (https://Modus eDiscovery.com/) - Elevate was selected by [employer] as Hipolito of the Year! Elevate is a brain training program designed to improve focus, speaking abilities, processing speed, memory, math skills, and more. Each person is provided with a personalized training program that adjusts over time to maximize results. Theoretically, the more you train with Elevate, the more you'll improve critical cognitive skills that are proven to boost productivity, earning power, and self-confidence. Users who train at least 3 times per week have reported dramatic gains and increased confidence. In-hipolito purchases.     · Peak (https://www.NextMusic.TV.net/) - Reach Peak performance with over 40 unique games, each one developed by neuroscientists and game experts to challenge your cognitive skills and push you further. Use , the  for your brain, to find the right workout for you at the right time. Choose from 's best recommendations to push your skills to the max. Or take contextual workouts like Coffee Break if you're short on time.  will help you track your progress using in-depth insights and keep you going when you need it most. Play for free or upgrade to Pro and get the best brain  training experience available. In-martha purchases.

## 2023-03-09 ENCOUNTER — OFFICE VISIT (OUTPATIENT)
Dept: NEUROSURGERY | Facility: CLINIC | Age: 68
End: 2023-03-09
Payer: MEDICARE

## 2023-03-09 ENCOUNTER — HOSPITAL ENCOUNTER (OUTPATIENT)
Dept: RADIOLOGY | Facility: HOSPITAL | Age: 68
Discharge: HOME | End: 2023-03-09
Attending: NEUROLOGICAL SURGERY
Payer: MEDICARE

## 2023-03-09 DIAGNOSIS — M48.02 SPINAL STENOSIS, CERVICAL REGION: ICD-10-CM

## 2023-03-09 DIAGNOSIS — M48.02 CERVICAL SPINAL STENOSIS: ICD-10-CM

## 2023-03-09 DIAGNOSIS — G25.0 ESSENTIAL TREMOR: Primary | ICD-10-CM

## 2023-03-09 PROCEDURE — 72050 X-RAY EXAM NECK SPINE 4/5VWS: CPT | Mod: 26,,, | Performed by: RADIOLOGY

## 2023-03-09 PROCEDURE — 72050 X-RAY EXAM NECK SPINE 4/5VWS: CPT | Mod: TC

## 2023-03-09 PROCEDURE — 99214 OFFICE O/P EST MOD 30 MIN: CPT | Mod: S$PBB,,, | Performed by: NEUROLOGICAL SURGERY

## 2023-03-09 PROCEDURE — 99999 PR PBB SHADOW E&M-EST. PATIENT-LVL II: CPT | Mod: PBBFAC,,, | Performed by: NEUROLOGICAL SURGERY

## 2023-03-09 PROCEDURE — 72125 CT NECK SPINE W/O DYE: CPT | Mod: 26,,, | Performed by: RADIOLOGY

## 2023-03-09 PROCEDURE — 99212 OFFICE O/P EST SF 10 MIN: CPT | Mod: PBBFAC,25 | Performed by: NEUROLOGICAL SURGERY

## 2023-03-09 PROCEDURE — 99214 PR OFFICE/OUTPT VISIT, EST, LEVL IV, 30-39 MIN: ICD-10-PCS | Mod: S$PBB,,, | Performed by: NEUROLOGICAL SURGERY

## 2023-03-09 PROCEDURE — 72125 CT NECK SPINE W/O DYE: CPT | Mod: TC

## 2023-03-09 PROCEDURE — 99999 PR PBB SHADOW E&M-EST. PATIENT-LVL II: ICD-10-PCS | Mod: PBBFAC,,, | Performed by: NEUROLOGICAL SURGERY

## 2023-03-09 PROCEDURE — 72050 XR CERVICAL SPINE AP LAT WITH FLEX EXTEN: ICD-10-PCS | Mod: 26,,, | Performed by: RADIOLOGY

## 2023-03-09 PROCEDURE — 72125 CT CERVICAL SPINE WITHOUT CONTRAST: ICD-10-PCS | Mod: 26,,, | Performed by: RADIOLOGY

## 2023-03-09 NOTE — PROGRESS NOTES
"Neurosurgery  Follow up     SUBJECTIVE:     Chief Complaint: essential tremor     History of Present Illness:  Anna Wagoner is a 67 y.o. right-handed female referred by Dr. Shea for consideration of DBS for essential tremor. She presents today virtually.     Tremor began at age 11, like a "flutter;" it is exacerbated by her being upset. It has been progressive over time. She finds that she drops things and has balance difficulties. Sometimes she drops things independent of the tremor. She has a difficult time typing. She has a hard time walking far distances as a result of her neck and back pain. She endorses chronic pain. She has trouble climbing stairs.     She had hallucinations on propranolol; primidone doesn't particularly help her tremor. She has head, voice, and left hand tremor.     She enjoys playing on the ipad or playing with her children and grandchildren.     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 She has her high school diploma.     Goals for surgery: to improve and minimize the tremor as much as possible. To be able to move better and exercise. To improve her handwriting.     She lives alone with her dog. Daughter Candice, who joins her today, will help to care for her perioperatively.     The patient denies any bleeding, infectious, or anesthetic complications with any previous surgery. No AC/AP agents.     As of 3/9/23, the patient returns to clinic for examination and after having obtained CT cervical.     She notes that she always wants to lean to the left when she has balance trouble. She has no trouble with tandem gait on my exam.     She notes frequent nasal discharge from the left nare which has been managed by her local ENT. She has never been able to capture the fluid for beta transferrin testing.     She endorses frequent muscle spasms of the neck, worse on the left side. This improves with massage.     She notes that she has frequent changes. She denies visual changes. "     Review of patient's allergies indicates:   Allergen Reactions    Metoprolol succinate Hives and Itching     Hives - wheals       Current Outpatient Medications   Medication Sig Dispense Refill    baclofen (LIORESAL) 10 MG tablet Take 1 tablet (10 mg total) by mouth every evening. 30 tablet 11    buPROPion (WELLBUTRIN XL) 300 MG 24 hr tablet Take 300 mg by mouth daily as needed.      butalbital-acetaminophen-caffeine -40 mg (FIORICET, ESGIC) -40 mg per tablet Take 1-2 tablets by mouth 3 (three) times daily as needed.      carvediloL (COREG) 12.5 MG tablet Take 12.5 mg by mouth 2 (two) times daily.      ergocalciferol (ERGOCALCIFEROL) 50,000 unit Cap Take 50,000 Units by mouth every 7 days.      famotidine (PEPCID) 20 MG tablet Take 20 mg by mouth.      rosuvastatin (CRESTOR) 10 MG tablet Take 10 mg by mouth once daily.      sertraline (ZOLOFT) 100 MG tablet Take 100 mg by mouth once daily.      ubidecarenone (COENZYME Q10 ORAL) Take 2 tablets by mouth once daily.      busPIRone (BUSPAR) 15 MG tablet Take 7.5-15 mg by mouth.      levothyroxine (SYNTHROID) 112 MCG tablet Take 112 mcg by mouth.      primidone (MYSOLINE) 50 MG Tab Take 1 tablet (50 mg total) by mouth 2 (two) times a day. (Patient not taking: Reported on 3/9/2023) 60 tablet 11     No current facility-administered medications for this visit.       Past Medical History:   Diagnosis Date    Anxiety     Hashimoto's thyroiditis     Hypertension     Neck pain, chronic      Past Surgical History:   Procedure Laterality Date    BREAST SURGERY      CHOLECYSTECTOMY      HYSTERECTOMY      TONSILLECTOMY       Family History       Problem Relation (Age of Onset)    Tremor Mother          Social History     Socioeconomic History    Marital status:    Tobacco Use    Smoking status: Former     Types: Cigarettes       Review of Systems   Constitutional:  Negative for fever.   HENT:  Negative for nosebleeds.    Eyes:  Negative for visual  disturbance.   Respiratory:  Negative for shortness of breath.    Cardiovascular:  Negative for chest pain.   Gastrointestinal:  Positive for constipation.   Endocrine: Positive for heat intolerance.   Genitourinary:  Negative for difficulty urinating.   Musculoskeletal:  Negative for neck pain.   Skin:  Negative for color change.   Neurological:  Positive for tremors.   Hematological:  Does not bruise/bleed easily.   Psychiatric/Behavioral:  Positive for dysphoric mood. The patient is nervous/anxious.         Chronic pain     OBJECTIVE:     Vital Signs  Pain Score:   3  There is no height or weight on file to calculate BMI.      Physical Exam:    Constitutional: She appears well-developed and well-nourished. No distress.     Eyes: EOM are normal.     Skin: Skin displays no rash on extremities. Skin displays no lesions on extremities.     Psych/Behavior: She is alert. She is oriented to person, place, and time.     Musculoskeletal:      Comments: 4++/5 bilateral hand intrinsics   Otherwise full strength     Neurological:   L > R hand tremor   Gait normal   Voice tremor   Tandem gait normal   No Desai's     Pulmonary: nonlabored respirations     Hematologic: no bruising noted     Diagnostic Results:  MRI personally reviewed   Cervical stenosis C3-C4   Loss of cervical lordosis   CT and flex/ex personally reviewed   ASSESSMENT/PLAN:     Anna Wagoner is a 67 y.o. female who presents as a referral from Dr. Shea for consideration of DBS therapy for Parkinson disease.     After in-person examination today, I do not believe the patient is currently myelopathic. We discussed the signs and symptoms of myelopathy for which she would need to have her cervical stenosis addressed right away.     She will still need to be discussed further in interdisciplinary conference, and she will need MRI of the brain DBS protocol, but I suspect he would best benefit from left brain DBS first to benefit right side, since she is right  handed.      We had a lengthy discussion about the risks, benefits, and alternatives to deep brain stimulation.  Risks include, but are not limited to, bleeding, pain, infection, scarring, need for further/repeat procedure, failure to slow the progression of neurodegenerative disease symptoms, speech difficulty, balance difficulty, cognitive changes, loss of inhibition, intracranial hemorrhage, venous infarct, seizure, stroke, paralysis, loss of the ability to swim, and death. Hardware-related complications may also occur.  This is an implanted device, meaning that any infection elsewhere in the body mass be treated immediately to minimize the risk of blood stream infection seeding the device. Should this happen, it is possible that the entire system would require removal. We also discussed that tremor may become refractory to stimulation after a period of time.      We also reviewed the work flow employed at [facility] for placement of deep brain stimulation devices.  We discussed that she would be admitted on a Tuesday morning for placement of 5 fiducial screws.  We clarified this would require shaving the entire head.  After the fiducial screws are placed, the patient would be transferred to CT scan to obtain a fiducial registration CT scan.  This would be merged with the already obtained MRI.  The patient would be brought back to the operating room for definitive placement of the DBS lead.  This is to be done while awake. The patient expressed understanding thereof. The following week, the patient would be readmitted for asleep implantation of extension cable and pulse generator on an outpatient basis.  The device would subsequently be programmed by our movement disorders colleagues.     The patient notes that she has no preference for generator side.      She will need to have MRI of the brain, DBS protocol, prior to surgery. We also discussed that she will meet with my , Dr. Bonifacio Kraft, prior to  surgery.     Payroll is March 15, then 29th, April 12, 26th, May 10 and 24th. She would prefer to schedule around those if possible.     I have encouraged the patient to contact the clinic in interim with any questions, concerns, or adverse clinical change.

## 2023-03-10 ENCOUNTER — TELEPHONE (OUTPATIENT)
Dept: NEUROSURGERY | Facility: CLINIC | Age: 68
End: 2023-03-10
Payer: MEDICARE

## 2023-03-10 DIAGNOSIS — G25.0 ESSENTIAL TREMOR: Primary | ICD-10-CM

## 2023-03-16 ENCOUNTER — TELEPHONE (OUTPATIENT)
Dept: NEUROLOGY | Facility: CLINIC | Age: 68
End: 2023-03-16
Payer: MEDICARE

## 2023-03-16 NOTE — TELEPHONE ENCOUNTER
Message from 22      W: Appointment Request   Makenna Cade MA   Sent:  11:39 AM   To: JAZMÍN Wagoner   MRN: [id number] : 1955   Pt Home: [phone]     Entered: [phone]        Message       ----- Message -----   From: Anna Wagoner   Sent: 2022  10:40 AM CST   To: , *   Subject: Appointment Request                                Appointment Request From: Anna Wagoner      With Provider: Kim Montgomery PA-C [[facility]]      Preferred Date Range: 2022 - 2022      Preferred Times: Any Time      Reason for visit: follow up

## 2023-03-22 ENCOUNTER — TELEPHONE (OUTPATIENT)
Dept: NEUROLOGY | Facility: CLINIC | Age: 68
End: 2023-03-22
Payer: MEDICARE

## 2023-03-27 ENCOUNTER — OFFICE VISIT (OUTPATIENT)
Dept: NEUROSURGERY | Facility: CLINIC | Age: 68
End: 2023-03-27
Payer: MEDICARE

## 2023-03-27 VITALS
DIASTOLIC BLOOD PRESSURE: 87 MMHG | WEIGHT: 201 LBS | BODY MASS INDEX: 35.61 KG/M2 | HEART RATE: 71 BPM | HEIGHT: 63 IN | SYSTOLIC BLOOD PRESSURE: 143 MMHG

## 2023-03-27 DIAGNOSIS — G25.0 ESSENTIAL TREMOR: Primary | ICD-10-CM

## 2023-03-27 PROCEDURE — 99214 OFFICE O/P EST MOD 30 MIN: CPT | Mod: PBBFAC | Performed by: NEUROLOGICAL SURGERY

## 2023-03-27 PROCEDURE — 99214 OFFICE O/P EST MOD 30 MIN: CPT | Mod: S$PBB,,, | Performed by: NEUROLOGICAL SURGERY

## 2023-03-27 PROCEDURE — 99214 PR OFFICE/OUTPT VISIT, EST, LEVL IV, 30-39 MIN: ICD-10-PCS | Mod: S$PBB,,, | Performed by: NEUROLOGICAL SURGERY

## 2023-03-27 PROCEDURE — 99999 PR PBB SHADOW E&M-EST. PATIENT-LVL IV: CPT | Mod: PBBFAC,,, | Performed by: NEUROLOGICAL SURGERY

## 2023-03-27 PROCEDURE — 99999 PR PBB SHADOW E&M-EST. PATIENT-LVL IV: ICD-10-PCS | Mod: PBBFAC,,, | Performed by: NEUROLOGICAL SURGERY

## 2023-03-27 RX ORDER — TIZANIDINE 4 MG/1
4 TABLET ORAL 2 TIMES DAILY
Status: ON HOLD | COMMUNITY
Start: 2023-03-13 | End: 2023-04-25 | Stop reason: HOSPADM

## 2023-03-27 RX ORDER — PANTOPRAZOLE SODIUM 40 MG/1
40 TABLET, DELAYED RELEASE ORAL
COMMUNITY
Start: 2023-03-12

## 2023-03-27 RX ORDER — GLUCOSAMINE HCL 500 MG
1 TABLET ORAL DAILY
COMMUNITY

## 2023-03-27 RX ORDER — LIOTHYRONINE SODIUM 5 UG/1
5 TABLET ORAL
COMMUNITY
Start: 2023-03-22

## 2023-03-27 NOTE — PROGRESS NOTES
Anna Wagoner was seen in neurosurgical consultation at the office this morning.  She is a 67-year-old lady with an almost lifelong history of tremor in her hands beginning as a child.  She was able to function most of her life but in the last few years tremor has gradually increased to where it is affecting her work and daily activities.  She has been [facility].  She is a  and the tremor is now affecting her ability to use a computer and her handwriting and personal grooming.  She has taken Mysoline and propanolol in the past which have helped a little but been associated with side effects.  She has noticed some tremor coming into her voice and some arrest of speech when she goes to say something.  She does feel that her balance is not as good.  She hopes to keep working for another few years and deep brain stimulation is now being considered for tremor management.  She does feel that she gets distracted a little more easily.  She feels her memory is perhaps not quite as good. She used to be very organized and now she sometimes jumps from one task to another.  Past medical history includes hypertension managed with Coreg and she is taking Synthroid replacement.  Review of systems is otherwise noncontributory.    On physical examination she is a well-developed, well-nourished white lady who is awake and cooperative.  Examination of the head is unremarkable.  Eyes show full extraocular movements, pupils are equal reactive to light and fundi show clear disc margins.  Hearing is intact to finger rubbing bilaterally.  On neurological examination she is able to provide a good history.  She shows a fine tremor of the outstretched upper extremities worse on the left and this is somewhat increased with finger-to-nose testing.  There is no associated rigidity.  Gait is a little slow but not ataxic.  Cranial nerves otherwise intact, she has normal facial sensation and movement and  the tongue protrudes in the midline.  She shows good strength in the extremities, normal sensation, rather hyperactive deep tendon reflexes.    MRI of the brain was done at [facility] on 02/27/2023.  There are no specific abnormalities noted.  Navigation sequences were done as part of this study.    Impression:  Essential tremor.    Recommendation:  We discussed the procedure for deep brain stimulation.  We are planning bilateral implantation of DBS electrodes in the VIM thalamic nuclei.  Stereotaxy is employed using MRI for the final position of the electrode tip and CT scan with temporary fiducial screws used to register the system.  Microelectrode recording and stimulation are done during operation to assure good placement.  The electrodes will be buried under the scalp and she will return the following week for insertion of the pulse generator under general anesthesia.  We are planning operation for for 4/18/2023.

## 2023-03-28 ENCOUNTER — PATIENT MESSAGE (OUTPATIENT)
Dept: SURGERY | Facility: HOSPITAL | Age: 68
End: 2023-03-28
Payer: MEDICARE

## 2023-04-17 ENCOUNTER — ANESTHESIA EVENT (OUTPATIENT)
Dept: SURGERY | Facility: HOSPITAL | Age: 68
DRG: 027 | End: 2023-04-17
Payer: MEDICARE

## 2023-04-17 ENCOUNTER — DOCUMENTATION ONLY (OUTPATIENT)
Dept: NEUROLOGY | Facility: CLINIC | Age: 68
End: 2023-04-17
Payer: MEDICARE

## 2023-04-18 ENCOUNTER — HOSPITAL ENCOUNTER (INPATIENT)
Facility: HOSPITAL | Age: 68
LOS: 1 days | Discharge: HOME | DRG: 027 | End: 2023-04-19
Attending: NEUROLOGICAL SURGERY | Admitting: NEUROLOGICAL SURGERY
Payer: MEDICARE

## 2023-04-18 ENCOUNTER — ANESTHESIA (OUTPATIENT)
Dept: SURGERY | Facility: HOSPITAL | Age: 68
DRG: 027 | End: 2023-04-18
Payer: MEDICARE

## 2023-04-18 DIAGNOSIS — G25.0 ESSENTIAL TREMOR: ICD-10-CM

## 2023-04-18 PROCEDURE — 25000003 PHARM REV CODE 250: Performed by: STUDENT IN AN ORGANIZED HEALTH CARE EDUCATION/TRAINING PROGRAM

## 2023-04-18 PROCEDURE — 63600175 PHARM REV CODE 636 W HCPCS: Performed by: STUDENT IN AN ORGANIZED HEALTH CARE EDUCATION/TRAINING PROGRAM

## 2023-04-18 PROCEDURE — 61867 PR IMPLANT NEUROELECTRODE W/ RECORDING: ICD-10-PCS | Mod: LT,GC,, | Performed by: NEUROLOGICAL SURGERY

## 2023-04-18 PROCEDURE — 63600175 PHARM REV CODE 636 W HCPCS: Performed by: NEUROLOGICAL SURGERY

## 2023-04-18 PROCEDURE — 36000711: Performed by: NEUROLOGICAL SURGERY

## 2023-04-18 PROCEDURE — 27201423 OPTIME MED/SURG SUP & DEVICES STERILE SUPPLY: Performed by: NEUROLOGICAL SURGERY

## 2023-04-18 PROCEDURE — 36000710: Performed by: NEUROLOGICAL SURGERY

## 2023-04-18 PROCEDURE — 95961 ELECTRODE STIMULATION BRAIN: CPT | Mod: 26,,, | Performed by: PSYCHIATRY & NEUROLOGY

## 2023-04-18 PROCEDURE — 63600175 PHARM REV CODE 636 W HCPCS: Performed by: NURSE ANESTHETIST, CERTIFIED REGISTERED

## 2023-04-18 PROCEDURE — 61867 IMPLANT NEUROELECTRODE: CPT | Mod: LT,GC,, | Performed by: NEUROLOGICAL SURGERY

## 2023-04-18 PROCEDURE — 71000015 HC POSTOP RECOV 1ST HR: Performed by: NEUROLOGICAL SURGERY

## 2023-04-18 PROCEDURE — A4648 IMPLANTABLE TISSUE MARKER: HCPCS | Performed by: NEUROLOGICAL SURGERY

## 2023-04-18 PROCEDURE — 25000003 PHARM REV CODE 250: Performed by: NEUROLOGICAL SURGERY

## 2023-04-18 PROCEDURE — D9220A PRA ANESTHESIA: ICD-10-PCS | Mod: ANES,,, | Performed by: ANESTHESIOLOGY

## 2023-04-18 PROCEDURE — D9220A PRA ANESTHESIA: ICD-10-PCS | Mod: CRNA,,, | Performed by: NURSE ANESTHETIST, CERTIFIED REGISTERED

## 2023-04-18 PROCEDURE — 71000016 HC POSTOP RECOV ADDL HR: Performed by: NEUROLOGICAL SURGERY

## 2023-04-18 PROCEDURE — 25000003 PHARM REV CODE 250: Performed by: NURSE ANESTHETIST, CERTIFIED REGISTERED

## 2023-04-18 PROCEDURE — 37000008 HC ANESTHESIA 1ST 15 MINUTES: Performed by: NEUROLOGICAL SURGERY

## 2023-04-18 PROCEDURE — D9220A PRA ANESTHESIA: Mod: ANES,,, | Performed by: ANESTHESIOLOGY

## 2023-04-18 PROCEDURE — 94761 N-INVAS EAR/PLS OXIMETRY MLT: CPT

## 2023-04-18 PROCEDURE — C1778 LEAD, NEUROSTIMULATOR: HCPCS | Performed by: NEUROLOGICAL SURGERY

## 2023-04-18 PROCEDURE — 95962 PR FUNC CORTICAL MAP,BRAIN,EA ADDN HR: ICD-10-PCS | Mod: 26,,, | Performed by: PSYCHIATRY & NEUROLOGY

## 2023-04-18 PROCEDURE — 71000033 HC RECOVERY, INTIAL HOUR: Performed by: NEUROLOGICAL SURGERY

## 2023-04-18 PROCEDURE — D9220A PRA ANESTHESIA: Mod: CRNA,,, | Performed by: NURSE ANESTHETIST, CERTIFIED REGISTERED

## 2023-04-18 PROCEDURE — 95962 ELECTRODE STIM BRAIN ADD-ON: CPT | Mod: 26,,, | Performed by: PSYCHIATRY & NEUROLOGY

## 2023-04-18 PROCEDURE — 63600175 PHARM REV CODE 636 W HCPCS: Performed by: ANESTHESIOLOGY

## 2023-04-18 PROCEDURE — 95961 PR ELECTRODE STIM,BRAIN,MD 1ST HR: ICD-10-PCS | Mod: 26,,, | Performed by: PSYCHIATRY & NEUROLOGY

## 2023-04-18 PROCEDURE — 11000001 HC ACUTE MED/SURG PRIVATE ROOM

## 2023-04-18 PROCEDURE — 37000009 HC ANESTHESIA EA ADD 15 MINS: Performed by: NEUROLOGICAL SURGERY

## 2023-04-18 DEVICE — IMPLANTABLE DEVICE: Type: IMPLANTABLE DEVICE | Site: CRANIAL | Status: FUNCTIONAL

## 2023-04-18 RX ORDER — ACETAMINOPHEN 10 MG/ML
INJECTION, SOLUTION INTRAVENOUS
Status: DISCONTINUED | OUTPATIENT
Start: 2023-04-18 | End: 2023-04-18

## 2023-04-18 RX ORDER — REMIFENTANIL HYDROCHLORIDE 1 MG/ML
INJECTION, POWDER, LYOPHILIZED, FOR SOLUTION INTRAVENOUS CONTINUOUS PRN
Status: DISCONTINUED | OUTPATIENT
Start: 2023-04-18 | End: 2023-04-18

## 2023-04-18 RX ORDER — LORAZEPAM 2 MG/ML
0.25 INJECTION INTRAMUSCULAR ONCE AS NEEDED
Status: DISCONTINUED | OUTPATIENT
Start: 2023-04-18 | End: 2023-04-18 | Stop reason: HOSPADM

## 2023-04-18 RX ORDER — DEXAMETHASONE SODIUM PHOSPHATE 4 MG/ML
INJECTION, SOLUTION INTRA-ARTICULAR; INTRALESIONAL; INTRAMUSCULAR; INTRAVENOUS; SOFT TISSUE
Status: DISCONTINUED | OUTPATIENT
Start: 2023-04-18 | End: 2023-04-18

## 2023-04-18 RX ORDER — PROPOFOL 10 MG/ML
VIAL (ML) INTRAVENOUS CONTINUOUS PRN
Status: DISCONTINUED | OUTPATIENT
Start: 2023-04-18 | End: 2023-04-18

## 2023-04-18 RX ORDER — CARVEDILOL 12.5 MG/1
12.5 TABLET ORAL 2 TIMES DAILY
Status: DISCONTINUED | OUTPATIENT
Start: 2023-04-18 | End: 2023-04-19 | Stop reason: HOSPADM

## 2023-04-18 RX ORDER — NICARDIPINE HYDROCHLORIDE 0.2 MG/ML
INJECTION INTRAVENOUS CONTINUOUS PRN
Status: DISCONTINUED | OUTPATIENT
Start: 2023-04-18 | End: 2023-04-18

## 2023-04-18 RX ORDER — HYDRALAZINE HYDROCHLORIDE 20 MG/ML
20 INJECTION INTRAMUSCULAR; INTRAVENOUS EVERY 4 HOURS PRN
Status: DISCONTINUED | OUTPATIENT
Start: 2023-04-18 | End: 2023-04-19 | Stop reason: HOSPADM

## 2023-04-18 RX ORDER — HYDROCODONE BITARTRATE AND ACETAMINOPHEN 5; 325 MG/1; MG/1
1 TABLET ORAL EVERY 4 HOURS PRN
Status: DISCONTINUED | OUTPATIENT
Start: 2023-04-18 | End: 2023-04-18

## 2023-04-18 RX ORDER — MUPIROCIN 20 MG/G
OINTMENT TOPICAL
Status: DISCONTINUED | OUTPATIENT
Start: 2023-04-18 | End: 2023-04-18 | Stop reason: HOSPADM

## 2023-04-18 RX ORDER — SODIUM CHLORIDE 9 MG/ML
INJECTION, SOLUTION INTRAVENOUS CONTINUOUS
Status: DISCONTINUED | OUTPATIENT
Start: 2023-04-18 | End: 2023-04-19 | Stop reason: HOSPADM

## 2023-04-18 RX ORDER — LIOTHYRONINE SODIUM 5 UG/1
5 TABLET ORAL DAILY
Status: DISCONTINUED | OUTPATIENT
Start: 2023-04-18 | End: 2023-04-19 | Stop reason: HOSPADM

## 2023-04-18 RX ORDER — HYDROCODONE BITARTRATE AND ACETAMINOPHEN 10; 325 MG/1; MG/1
1 TABLET ORAL EVERY 6 HOURS PRN
Status: DISCONTINUED | OUTPATIENT
Start: 2023-04-18 | End: 2023-04-19

## 2023-04-18 RX ORDER — TIZANIDINE 4 MG/1
4 TABLET ORAL 2 TIMES DAILY
Status: DISCONTINUED | OUTPATIENT
Start: 2023-04-18 | End: 2023-04-19 | Stop reason: HOSPADM

## 2023-04-18 RX ORDER — BUPIVACAINE HYDROCHLORIDE AND EPINEPHRINE 5; 5 MG/ML; UG/ML
INJECTION, SOLUTION EPIDURAL; INTRACAUDAL; PERINEURAL
Status: DISCONTINUED | OUTPATIENT
Start: 2023-04-18 | End: 2023-04-18 | Stop reason: HOSPADM

## 2023-04-18 RX ORDER — LIDOCAINE HYDROCHLORIDE 20 MG/ML
INJECTION INTRAVENOUS
Status: DISCONTINUED | OUTPATIENT
Start: 2023-04-18 | End: 2023-04-18

## 2023-04-18 RX ORDER — MUPIROCIN 20 MG/G
1 OINTMENT TOPICAL 2 TIMES DAILY
Status: DISCONTINUED | OUTPATIENT
Start: 2023-04-18 | End: 2023-04-18 | Stop reason: HOSPADM

## 2023-04-18 RX ORDER — ONDANSETRON 2 MG/ML
4 INJECTION INTRAMUSCULAR; INTRAVENOUS ONCE AS NEEDED
Status: COMPLETED | OUTPATIENT
Start: 2023-04-18 | End: 2023-04-18

## 2023-04-18 RX ORDER — ONDANSETRON 2 MG/ML
INJECTION INTRAMUSCULAR; INTRAVENOUS
Status: DISCONTINUED | OUTPATIENT
Start: 2023-04-18 | End: 2023-04-18

## 2023-04-18 RX ORDER — LABETALOL HYDROCHLORIDE 5 MG/ML
INJECTION, SOLUTION INTRAVENOUS
Status: DISCONTINUED | OUTPATIENT
Start: 2023-04-18 | End: 2023-04-18

## 2023-04-18 RX ORDER — ONDANSETRON 2 MG/ML
4 INJECTION INTRAMUSCULAR; INTRAVENOUS EVERY 12 HOURS PRN
Status: DISCONTINUED | OUTPATIENT
Start: 2023-04-18 | End: 2023-04-19 | Stop reason: HOSPADM

## 2023-04-18 RX ORDER — ATORVASTATIN CALCIUM 10 MG/1
10 TABLET, FILM COATED ORAL DAILY
Status: DISCONTINUED | OUTPATIENT
Start: 2023-04-18 | End: 2023-04-19 | Stop reason: HOSPADM

## 2023-04-18 RX ORDER — SODIUM CHLORIDE 0.9 % (FLUSH) 0.9 %
3 SYRINGE (ML) INJECTION
Status: DISCONTINUED | OUTPATIENT
Start: 2023-04-18 | End: 2023-04-18 | Stop reason: HOSPADM

## 2023-04-18 RX ORDER — MORPHINE SULFATE 2 MG/ML
4 INJECTION, SOLUTION INTRAMUSCULAR; INTRAVENOUS EVERY 4 HOURS PRN
Status: DISCONTINUED | OUTPATIENT
Start: 2023-04-18 | End: 2023-04-19

## 2023-04-18 RX ORDER — HALOPERIDOL 5 MG/ML
0.5 INJECTION INTRAMUSCULAR EVERY 10 MIN PRN
Status: DISCONTINUED | OUTPATIENT
Start: 2023-04-18 | End: 2023-04-18 | Stop reason: HOSPADM

## 2023-04-18 RX ORDER — SERTRALINE HYDROCHLORIDE 100 MG/1
100 TABLET, FILM COATED ORAL DAILY
Status: DISCONTINUED | OUTPATIENT
Start: 2023-04-18 | End: 2023-04-19 | Stop reason: HOSPADM

## 2023-04-18 RX ORDER — CEFTRIAXONE 1 G/1
INJECTION, POWDER, FOR SOLUTION INTRAMUSCULAR; INTRAVENOUS
Status: DISCONTINUED | OUTPATIENT
Start: 2023-04-18 | End: 2023-04-18 | Stop reason: HOSPADM

## 2023-04-18 RX ORDER — HYDRALAZINE HYDROCHLORIDE 20 MG/ML
INJECTION INTRAMUSCULAR; INTRAVENOUS
Status: DISCONTINUED | OUTPATIENT
Start: 2023-04-18 | End: 2023-04-18

## 2023-04-18 RX ORDER — HYDROMORPHONE HYDROCHLORIDE 1 MG/ML
0.2 INJECTION, SOLUTION INTRAMUSCULAR; INTRAVENOUS; SUBCUTANEOUS EVERY 5 MIN PRN
Status: DISCONTINUED | OUTPATIENT
Start: 2023-04-18 | End: 2023-04-18 | Stop reason: HOSPADM

## 2023-04-18 RX ORDER — PANTOPRAZOLE SODIUM 40 MG/1
40 TABLET, DELAYED RELEASE ORAL DAILY
Status: DISCONTINUED | OUTPATIENT
Start: 2023-04-18 | End: 2023-04-19 | Stop reason: HOSPADM

## 2023-04-18 RX ORDER — PROPOFOL 10 MG/ML
VIAL (ML) INTRAVENOUS
Status: DISCONTINUED | OUTPATIENT
Start: 2023-04-18 | End: 2023-04-18

## 2023-04-18 RX ORDER — LIDOCAINE HYDROCHLORIDE AND EPINEPHRINE 10; 10 MG/ML; UG/ML
INJECTION, SOLUTION INFILTRATION; PERINEURAL
Status: DISCONTINUED | OUTPATIENT
Start: 2023-04-18 | End: 2023-04-18 | Stop reason: HOSPADM

## 2023-04-18 RX ADMIN — LIDOCAINE HYDROCHLORIDE 60 MG: 20 INJECTION INTRAVENOUS at 07:04

## 2023-04-18 RX ADMIN — HYDRALAZINE HYDROCHLORIDE 5 MG: 20 INJECTION INTRAMUSCULAR; INTRAVENOUS at 10:04

## 2023-04-18 RX ADMIN — MORPHINE SULFATE 4 MG: 2 INJECTION, SOLUTION INTRAMUSCULAR; INTRAVENOUS at 08:04

## 2023-04-18 RX ADMIN — REMIFENTANIL HYDROCHLORIDE 0.05 MCG/KG/MIN: 1 INJECTION, POWDER, LYOPHILIZED, FOR SOLUTION INTRAVENOUS at 07:04

## 2023-04-18 RX ADMIN — LABETALOL HYDROCHLORIDE 5 MG: 5 INJECTION, SOLUTION INTRAVENOUS at 09:04

## 2023-04-18 RX ADMIN — HYDRALAZINE HYDROCHLORIDE 2 MG: 20 INJECTION INTRAMUSCULAR; INTRAVENOUS at 10:04

## 2023-04-18 RX ADMIN — HYDROCODONE BITARTRATE AND ACETAMINOPHEN 1 TABLET: 10; 325 TABLET ORAL at 11:04

## 2023-04-18 RX ADMIN — DEXTROSE MONOHYDRATE 2 G: 5 INJECTION INTRAVENOUS at 07:04

## 2023-04-18 RX ADMIN — HYDROCODONE BITARTRATE AND ACETAMINOPHEN 1 TABLET: 10; 325 TABLET ORAL at 03:04

## 2023-04-18 RX ADMIN — SERTRALINE 100 MG: 100 TABLET, FILM COATED ORAL at 01:04

## 2023-04-18 RX ADMIN — HYDRALAZINE HYDROCHLORIDE 4 MG: 20 INJECTION INTRAMUSCULAR; INTRAVENOUS at 10:04

## 2023-04-18 RX ADMIN — CEFTRIAXONE 2 G: 2 INJECTION, POWDER, FOR SOLUTION INTRAMUSCULAR; INTRAVENOUS at 08:04

## 2023-04-18 RX ADMIN — ONDANSETRON 4 MG: 2 INJECTION INTRAMUSCULAR; INTRAVENOUS at 08:04

## 2023-04-18 RX ADMIN — NICARDIPINE HYDROCHLORIDE 5 MG/HR: 0.2 INJECTION, SOLUTION INTRAVENOUS at 10:04

## 2023-04-18 RX ADMIN — DEXAMETHASONE SODIUM PHOSPHATE 4 MG: 4 INJECTION INTRA-ARTICULAR; INTRALESIONAL; INTRAMUSCULAR; INTRAVENOUS; SOFT TISSUE at 12:04

## 2023-04-18 RX ADMIN — CARVEDILOL 12.5 MG: 12.5 TABLET, FILM COATED ORAL at 08:04

## 2023-04-18 RX ADMIN — TIZANIDINE 4 MG: 4 TABLET ORAL at 08:04

## 2023-04-18 RX ADMIN — SODIUM CHLORIDE: 9 INJECTION, SOLUTION INTRAVENOUS at 12:04

## 2023-04-18 RX ADMIN — ATORVASTATIN CALCIUM 10 MG: 10 TABLET, FILM COATED ORAL at 01:04

## 2023-04-18 RX ADMIN — ONDANSETRON 4 MG: 2 INJECTION INTRAMUSCULAR; INTRAVENOUS at 12:04

## 2023-04-18 RX ADMIN — PANTOPRAZOLE SODIUM 40 MG: 40 TABLET, DELAYED RELEASE ORAL at 01:04

## 2023-04-18 RX ADMIN — Medication 50 MG: at 07:04

## 2023-04-18 RX ADMIN — LABETALOL HYDROCHLORIDE 10 MG: 5 INJECTION, SOLUTION INTRAVENOUS at 10:04

## 2023-04-18 RX ADMIN — PROPOFOL 150 MCG/KG/MIN: 10 INJECTION, EMULSION INTRAVENOUS at 07:04

## 2023-04-18 RX ADMIN — Medication 30 MG: at 11:04

## 2023-04-18 RX ADMIN — ACETAMINOPHEN 1000 MG: 10 INJECTION INTRAVENOUS at 10:04

## 2023-04-18 RX ADMIN — ONDANSETRON 4 MG: 2 INJECTION INTRAMUSCULAR; INTRAVENOUS at 01:04

## 2023-04-18 RX ADMIN — SODIUM CHLORIDE: 9 INJECTION, SOLUTION INTRAVENOUS at 07:04

## 2023-04-18 NOTE — PROCEDURES
DBS IntraOP Brain Mapping    HPI: 67 y.o. woman with Essential tremor presents for VIM DBS to help tremors.      Target  VIM  Laterality L  Lead Model Medtronic [id number]  Lead Passes  3    Pass1 - central  At deep contacts ocular torsion and double vision  Robust SAEED    Pass2 - lateral  Sustained arm/leg pulling    Pass3 - anterior  Sustained facial pulling      Final Position  1St pass - macrostim revealed tremor control and no SFX< 3mA  Tip at 1 mm above target    Patient was stimulated over 180 minutes.         Zainab Shea MD, MS [facility]  Department of Neurology  Movement Disorders

## 2023-04-18 NOTE — BRIEF OP NOTE
[facility] - Surgery ([facility])  Brief Operative Note    SUMMARY     Surgery Date: 4/18/2023     Surgeon(s) and Role:     * Bonifacio Kraft MD - Primary     * Ollie Vera MD - Resident - Assisting        Pre-op Diagnosis:  Essential tremor [G25.0]    Post-op Diagnosis:  Post-Op Diagnosis Codes:     * Essential tremor [G25.0]    Procedure(s) (LRB):  INSERTION, FIDUCIAL SCREW, SKULL/DBS (N/A)  INSERTION, DEEP BRAIN STIMULATOR (Left)    Anesthesia: Local MAC    Operative Findings:  ood lead placement     Estimated Blood Loss:    10cc         Specimens:   Specimen (24h ago, onward)      None            [id number]

## 2023-04-18 NOTE — HPI
Anna Wagoner was seen in neurosurgical consultation at the office this morning.  She is a 67-year-old lady with an almost lifelong history of tremor in her hands beginning as a child.  She was able to function most of her life but in the last few years tremor has gradually increased to where it is affecting her work and daily activities.  She has been followed in the [facility].  She is a  and the tremor is now affecting her ability to use a computer and her handwriting and personal grooming.  She has taken Mysoline and propanolol in the past which have helped a little but been associated with side effects.  She has noticed some tremor coming into her voice and some arrest of speech when she goes to say something.  She does feel that her balance is not as good.  She hopes to keep working for another few years and deep brain stimulation is now being considered for tremor management.  She does feel that she gets distracted a little more easily.  She feels her memory is perhaps not quite as good. She used to be very organized and now she sometimes jumps from one task to another.  Past medical history includes hypertension managed with Coreg and she is taking Synthroid replacement.  Review of systems is otherwise noncontributory.

## 2023-04-18 NOTE — SUBJECTIVE & OBJECTIVE
Medications Prior to Admission   Medication Sig Dispense Refill Last Dose    baclofen (LIORESAL) 10 MG tablet Take 1 tablet (10 mg total) by mouth every evening. 30 tablet 11 Past Month    buPROPion (WELLBUTRIN XL) 300 MG 24 hr tablet Take 300 mg by mouth daily as needed.   4/17/2023 at 0900    butalbital-acetaminophen-caffeine -40 mg (FIORICET, ESGIC) -40 mg per tablet Take 1-2 tablets by mouth 3 (three) times daily as needed.   Past Week    carvediloL (COREG) 12.5 MG tablet Take 12.5 mg by mouth 2 (two) times daily.   4/17/2023 at 0900    cholecalciferol, vitamin D3, 75 mcg (3,000 unit) Tab Take 1 tablet by mouth once daily.   Past Week    ergocalciferol (ERGOCALCIFEROL) 50,000 unit Cap Take 50,000 Units by mouth every 7 days.   Past Week    famotidine (PEPCID) 20 MG tablet Take 20 mg by mouth.   4/17/2023    liothyronine (CYTOMEL) 5 MCG Tab Take 5 mcg by mouth.   4/17/2023 at 0900    pantoprazole (PROTONIX) 40 MG tablet Take 40 mg by mouth.   4/17/2023 at 0900    rosuvastatin (CRESTOR) 10 MG tablet Take 10 mg by mouth once daily.   4/17/2023 at 2030    sertraline (ZOLOFT) 100 MG tablet Take 100 mg by mouth once daily.   4/17/2023 at 0900    tiZANidine (ZANAFLEX) 4 MG tablet Take 4 mg by mouth 2 (two) times daily.   4/17/2023 at 2030    ubidecarenone (COENZYME Q10 ORAL) Take 2 tablets by mouth once daily.   4/17/2023 at 0900    busPIRone (BUSPAR) 15 MG tablet Take 7.5-15 mg by mouth.       levothyroxine (SYNTHROID) 112 MCG tablet Take 112 mcg by mouth.       primidone (MYSOLINE) 50 MG Tab Take 1 tablet (50 mg total) by mouth 2 (two) times a day. (Patient not taking: Reported on 3/9/2023) 60 tablet 11 More than a month       Review of patient's allergies indicates:   Allergen Reactions    Metoprolol succinate Hives and Itching     Hives - wheals       Past Medical History:   Diagnosis Date    Anxiety     Hashimoto's thyroiditis     Hypertension     Neck pain, chronic      Past Surgical History:    Procedure Laterality Date    BREAST SURGERY      CHOLECYSTECTOMY      HYSTERECTOMY      TONSILLECTOMY       Family History       Problem Relation (Age of Onset)    Tremor Mother          Tobacco Use    Smoking status: Former     Types: Cigarettes     Passive exposure: Never    Smokeless tobacco: Never   Substance and Sexual Activity    Alcohol use: Not on file    Drug use: Not on file    Sexual activity: Not on file     Review of Systems  Objective:        There is no height or weight on file to calculate BMI.  Vital Signs (Most Recent):    Vital Signs (24h Range):                      Physical Exam  Neurosurgery Physical Exam  On physical examination she is a well-developed, well-nourished white lady who is awake and cooperative.  Examination of the head is unremarkable.  Eyes show full extraocular movements, pupils are equal reactive to light and fundi show clear disc margins.  Hearing is intact to finger rubbing bilaterally.  On neurological examination she is able to provide a good history.  She shows a fine tremor of the outstretched upper extremities worse on the left and this is somewhat increased with finger-to-nose testing.  There is no associated rigidity.  Gait is a little slow but not ataxic.  Cranial nerves otherwise intact, she has normal facial sensation and movement and the tongue protrudes in the midline.  She shows good strength in the extremities, normal sensation, rather hyperactive deep tendon reflexes.       Significant Labs:  No results for input(s): GLU, NA, K, CL, CO2, BUN, CREATININE, CALCIUM, MG in the last 48 hours.  No results for input(s): WBC, HGB, HCT, PLT in the last 48 hours.  No results for input(s): LABPT, INR, APTT in the last 48 hours.  Microbiology Results (last 7 days)       ** No results found for the last 168 hours. **          All pertinent labs from the last 24 hours have been reviewed.    Significant Diagnostics:  I have reviewed all pertinent imaging results/findings  within the past 24 hours.

## 2023-04-18 NOTE — H&P (VIEW-ONLY)
[facility] - Surgery ([facility])  Neuorsurgery  History and Physical     Patient Name: Anna Wagoner  MRN: [id number]  Admission Date: 4/18/2023  Attending Physician: Bonifacio Kraft MD   Primary Care Physician: Victor Hugo Evans MD    Patient information was obtained from patient and ER records.     Subjective:     Chief Complaint/Reason for Admission: tremor     HPI:  Anan Wagoner was seen in neurosurgical consultation at the office this morning.  She is a 67-year-old lady with an almost lifelong history of tremor in her hands beginning as a child.  She was able to function most of her life but in the last few years tremor has gradually increased to where it is affecting her work and daily activities.  She has been followed in the [facility].  She is a  and the tremor is now affecting her ability to use a computer and her handwriting and personal grooming.  She has taken Mysoline and propanolol in the past which have helped a little but been associated with side effects.  She has noticed some tremor coming into her voice and some arrest of speech when she goes to say something.  She does feel that her balance is not as good.  She hopes to keep working for another few years and deep brain stimulation is now being considered for tremor management.  She does feel that she gets distracted a little more easily.  She feels her memory is perhaps not quite as good. She used to be very organized and now she sometimes jumps from one task to another.  Past medical history includes hypertension managed with Coreg and she is taking Synthroid replacement.  Review of systems is otherwise noncontributory.      Medications Prior to Admission   Medication Sig Dispense Refill Last Dose    baclofen (LIORESAL) 10 MG tablet Take 1 tablet (10 mg total) by mouth every evening. 30 tablet 11 Past Month    buPROPion (WELLBUTRIN XL) 300 MG 24 hr tablet Take 300 mg by mouth daily as needed.   4/17/2023 at 0900     butalbital-acetaminophen-caffeine -40 mg (FIORICET, ESGIC) -40 mg per tablet Take 1-2 tablets by mouth 3 (three) times daily as needed.   Past Week    carvediloL (COREG) 12.5 MG tablet Take 12.5 mg by mouth 2 (two) times daily.   4/17/2023 at 0900    cholecalciferol, vitamin D3, 75 mcg (3,000 unit) Tab Take 1 tablet by mouth once daily.   Past Week    ergocalciferol (ERGOCALCIFEROL) 50,000 unit Cap Take 50,000 Units by mouth every 7 days.   Past Week    famotidine (PEPCID) 20 MG tablet Take 20 mg by mouth.   4/17/2023    liothyronine (CYTOMEL) 5 MCG Tab Take 5 mcg by mouth.   4/17/2023 at 0900    pantoprazole (PROTONIX) 40 MG tablet Take 40 mg by mouth.   4/17/2023 at 0900    rosuvastatin (CRESTOR) 10 MG tablet Take 10 mg by mouth once daily.   4/17/2023 at 2030    sertraline (ZOLOFT) 100 MG tablet Take 100 mg by mouth once daily.   4/17/2023 at 0900    tiZANidine (ZANAFLEX) 4 MG tablet Take 4 mg by mouth 2 (two) times daily.   4/17/2023 at 2030    ubidecarenone (COENZYME Q10 ORAL) Take 2 tablets by mouth once daily.   4/17/2023 at 0900    busPIRone (BUSPAR) 15 MG tablet Take 7.5-15 mg by mouth.       levothyroxine (SYNTHROID) 112 MCG tablet Take 112 mcg by mouth.       primidone (MYSOLINE) 50 MG Tab Take 1 tablet (50 mg total) by mouth 2 (two) times a day. (Patient not taking: Reported on 3/9/2023) 60 tablet 11 More than a month       Review of patient's allergies indicates:   Allergen Reactions    Metoprolol succinate Hives and Itching     Hives - wheals       Past Medical History:   Diagnosis Date    Anxiety     Hashimoto's thyroiditis     Hypertension     Neck pain, chronic      Past Surgical History:   Procedure Laterality Date    BREAST SURGERY      CHOLECYSTECTOMY      HYSTERECTOMY      TONSILLECTOMY       Family History       Problem Relation (Age of Onset)    Tremor Mother          Tobacco Use    Smoking status: Former     Types: Cigarettes     Passive exposure: Never     Smokeless tobacco: Never   Substance and Sexual Activity    Alcohol use: Not on file    Drug use: Not on file    Sexual activity: Not on file     Review of Systems  Objective:        There is no height or weight on file to calculate BMI.  Vital Signs (Most Recent):    Vital Signs (24h Range):                      Physical Exam  Neurosurgery Physical Exam  On physical examination she is a well-developed, well-nourished white lady who is awake and cooperative.  Examination of the head is unremarkable.  Eyes show full extraocular movements, pupils are equal reactive to light and fundi show clear disc margins.  Hearing is intact to finger rubbing bilaterally.  On neurological examination she is able to provide a good history.  She shows a fine tremor of the outstretched upper extremities worse on the left and this is somewhat increased with finger-to-nose testing.  There is no associated rigidity.  Gait is a little slow but not ataxic.  Cranial nerves otherwise intact, she has normal facial sensation and movement and the tongue protrudes in the midline.  She shows good strength in the extremities, normal sensation, rather hyperactive deep tendon reflexes.       Significant Labs:  No results for input(s): GLU, NA, K, CL, CO2, BUN, CREATININE, CALCIUM, MG in the last 48 hours.  No results for input(s): WBC, HGB, HCT, PLT in the last 48 hours.  No results for input(s): LABPT, INR, APTT in the last 48 hours.  Microbiology Results (last 7 days)       ** No results found for the last 168 hours. **          All pertinent labs from the last 24 hours have been reviewed.    Significant Diagnostics:  I have reviewed all pertinent imaging results/findings within the past 24 hours.    Assessment and Plan:     Essential tremor      66 y/o F with tremor. We discussed the procedure for deep brain stimulation.  We are planning bilateral implantation of DBS electrodes in the VIM thalamic nuclei.  Stereotaxy is employed using MRI for  the final position of the electrode tip and CT scan with temporary fiducial screws used to register the system.  Microelectrode recording and stimulation are done during operation to assure good placement.  The electrodes will be buried under the scalp and she will return the following week for insertion of the pulse generator under general anesthesia.  We are planning operation for for 4/18/2023.             Ollie Vera MD  Neurosurgery  [facility] - Surgery (2nd Fl)

## 2023-04-18 NOTE — OP NOTE
Date of Operation:  4/18/2023.    Preoperative Diagnosis:  Essential Tremor.    Postoperative Diagnosis:  Essential Tremor.    Procedure:  Placement of skull fiducial screws for intraoperative neuro navigation.  Implantation of deep brain stimulating electrode (SenSight [id number], Medtronic) in left VIM thalamus with microelectrode recording (Brett Mcdonough and Shabbir).    Surgeon:  Bonifacio Kraft MD    Assistant:  Ollie Vera MD (Resident in Neurosurgery)    Anesthesia:  Local/MAC    Estimated Blood Loss:  Less than 50 ml.    Condition at End of Procedure:  Satisfactory.    Brief History:  This 67-year-old lady has had upper extremity tremor most of her life.  This has now progressed to where she is having difficulty writing, using a computer, eating and personal grooming.  Medications have been only modestly helpful for her.  She has been followed in the [facility] and was felt to be a good candidate for deep brain stimulation.  Placement of an electrode in the left VIM thalamic nucleus is planned and the right side will be addressed if time permits.    Procedure in Detail:  The patient was brought to the operating room and in the supine position on her gurney given mild intravenous sedation.  A Cabral catheter was inserted, sequential compression devices applied to the legs, and various intravenous line started.  The head of the gurney was elevated and the scalp shaved, prepped with Betadine and draped in a sterile fashion.  Five fiducial marks were made with a crayon, 2 frontotemporal, 2 parietal and 1 just to the right of midline and these were injected with 1% xylocaine and epinephrine.  At each location a stab wound was made with a 15 blade and the pericranium scraped from the skull.  A 10 mm Medtronic skull fiducial screw was inserted into the skull with the power screwdriver and tightened by hand.  All screws fit snugly.  The small incisions were closed with 3-0 nylon sutures and reinjected  with 0.5% Marcaine and epinephrine.  The scalp was washed, bacitracin ointment applied to the base of the screws and the protective caps placed over them.  The head was wrapped with roller gauze and the patient brought to CT scan where CT scan with the fiducial screws was obtained to merge with MRI for intraoperative neuro navigation.  The navigation program was worked out and the fiducial screws registered to the system.  The patient was then returned to the operating room.    She was transferred from her gurney to the operating table and placed in a partial beach chair position with the head elevated and allowed to rest on a Shelley horseshoe which had been padded.  The roller gauze was removed and the protective caps taken off of the fiducial screws.  The nonsterile reference arc was attached to the scalp and the fiducial screws registered to the system with good tolerance.  The navigation program was then used to find the proposed entry sites in the posterior left and right frontal areas and these were marked with a crayon.  The scalp was prepped with Betadine, the bone marked with an 18 gauge needle and bone awl and the scalp covered with the Ioban shower curtain drape.  Small horseshoe incisions were outlined around the proposed entry sites.  Operation was begun on the left.  The incision was injected with 1% xylocaine and epinephrine and a wheal of xylocaine raised over the supraorbital nerve at the frontal boss.  The incision was carried through the skin galea and pericranium, the small skin flap elevated and retracted with a 2-0 silk suture and rubber band.  A 14 mm mary grace hole was made at the entry site and widened internally with the M8 attachment.  Bleeding on the dura was controlled with bipolar coagulation and Gelfoam.  The bur hole cover was placed over the mary grace hole and these 2 screws tightened to the skull.  The NexFrame ring was placed over the base and these 3 screws tightened to the skull also.   The sterile reference arc was attached to the ring base and the fiducial screws again registered to the system with good fidelity.  The socket assembly was placed on to the ring base and the probe used to find the trajectory and depth to target and these were marked on the Alpha Omega drive head stage.  The probe was exchanged for the multi lumen channel adapter and the drive head stage with its associated cables was affixed to the ring base.  The cannula was brought down through the center hole to the dura, the dura coagulated, opened with an 11 blade and the cannula advanced to 25 mm above target.  The microelectrode collet was attached to the head stage and the microelectrode brought down through the cannula to 15 mm above target.  With Dr. Mcdonough and Dr. Shea doing microelectrode recording and micro stimulation the electrode was advanced through the VIM thalamus with good recordings from about 10 mm above to 2 mm above target.  With micro stimulation beginning at 1 mm below target there was very good control on tremor but above about 2 or 2.5 volts she complained of diplopia and blurring of vision.  This improved at about 1 mm above target but was not completely gone until about 3 mm above target.  It was felt that perhaps the electrode was a little medial affecting the visual pathways.  A 2nd electrode was then passed 2 mm lateral to the original placement but at this location there was significant contraction and twitching of her face and intorsion of her right foot.  This seemed to be clearly too much in internal capsule.  A 3rd pass was then made 2 mm anterior to the original target and here reasonable tremor was controlled but again she had facial contraction consistent with corticospinal activation.  Tremor control was actually quite good with the original pass and it was decided to place the DBS electrode along the original track.  Stimulation with the DBS electrode gave good control of tremor and  seemed to have less effect on vision than the micro stimulation.  The electrode was capped in this location with the tip 1 mm above target and the last contact at 8.5 mm above target and the micro drive assembly removed.  The locking cap was placed over this.  The lead kit was attached to the DBS electrode, the patient given additional anesthesia, the scalp dissected and the electrode with its lead kit buried above her left ear.  The wound was thoroughly irrigated, the galea closed with inverted interrupted 4-0 Vicryl sutures, and the skin closed with skin staples.  The fiducial screws removed and the small incisions closed with skin staples also.  The scalp was washed, a Telfa bacitracin dressing placed over the wound and the head wrapped with roller gauze.  She was transferred back to her [facility] and taken to the recovery area in satisfactory condition.  She received 2 g of Rocephin at the beginning of the procedure and Rocephin containing antibiotic irrigating solutions were used throughout.

## 2023-04-18 NOTE — H&P
Kevin dimas - Surgery ([facility])  Neuorsurgery  History and Physical     Patient Name: Anna Wagoner  MRN: [id number]  Admission Date: 4/18/2023  Attending Physician: Bonifacio Kraft MD   Primary Care Physician: Victor Hugo Evans MD    Patient information was obtained from patient and ER records.     Subjective:     Chief Complaint/Reason for Admission: tremor     HPI:  Anna Wagoner was seen in neurosurgical consultation at the office this morning.  She is a 67-year-old lady with an almost lifelong history of tremor in her hands beginning as a child.  She was able to function most of her life but in the last few years tremor has gradually increased to where it is affecting her work and daily activities.  [facility].  She is a  and the tremor is now affecting her ability to use a computer and her handwriting and personal grooming.  She has taken Mysoline and propanolol in the past which have helped a little but been associated with side effects.  She has noticed some tremor coming into her voice and some arrest of speech when she goes to say something.  She does feel that her balance is not as good.  She hopes to keep working for another few years and deep brain stimulation is now being considered for tremor management.  She does feel that she gets distracted a little more easily.  She feels her memory is perhaps not quite as good. She used to be very organized and now she sometimes jumps from one task to another.  Past medical history includes hypertension managed with Coreg and she is taking Synthroid replacement.  Review of systems is otherwise noncontributory.      Medications Prior to Admission   Medication Sig Dispense Refill Last Dose    baclofen (LIORESAL) 10 MG tablet Take 1 tablet (10 mg total) by mouth every evening. 30 tablet 11 Past Month    buPROPion (WELLBUTRIN XL) 300 MG 24 hr tablet Take 300 mg by mouth daily as needed.   4/17/2023 at 0900     butalbital-acetaminophen-caffeine -40 mg (FIORICET, ESGIC) -40 mg per tablet Take 1-2 tablets by mouth 3 (three) times daily as needed.   Past Week    carvediloL (COREG) 12.5 MG tablet Take 12.5 mg by mouth 2 (two) times daily.   4/17/2023 at 0900    cholecalciferol, vitamin D3, 75 mcg (3,000 unit) Tab Take 1 tablet by mouth once daily.   Past Week    ergocalciferol (ERGOCALCIFEROL) 50,000 unit Cap Take 50,000 Units by mouth every 7 days.   Past Week    famotidine (PEPCID) 20 MG tablet Take 20 mg by mouth.   4/17/2023    liothyronine (CYTOMEL) 5 MCG Tab Take 5 mcg by mouth.   4/17/2023 at 0900    pantoprazole (PROTONIX) 40 MG tablet Take 40 mg by mouth.   4/17/2023 at 0900    rosuvastatin (CRESTOR) 10 MG tablet Take 10 mg by mouth once daily.   4/17/2023 at 2030    sertraline (ZOLOFT) 100 MG tablet Take 100 mg by mouth once daily.   4/17/2023 at 0900    tiZANidine (ZANAFLEX) 4 MG tablet Take 4 mg by mouth 2 (two) times daily.   4/17/2023 at 2030    ubidecarenone (COENZYME Q10 ORAL) Take 2 tablets by mouth once daily.   4/17/2023 at 0900    busPIRone (BUSPAR) 15 MG tablet Take 7.5-15 mg by mouth.       levothyroxine (SYNTHROID) 112 MCG tablet Take 112 mcg by mouth.       primidone (MYSOLINE) 50 MG Tab Take 1 tablet (50 mg total) by mouth 2 (two) times a day. (Patient not taking: Reported on 3/9/2023) 60 tablet 11 More than a month       Review of patient's allergies indicates:   Allergen Reactions    Metoprolol succinate Hives and Itching     Hives - wheals       Past Medical History:   Diagnosis Date    Anxiety     Hashimoto's thyroiditis     Hypertension     Neck pain, chronic      Past Surgical History:   Procedure Laterality Date    BREAST SURGERY      CHOLECYSTECTOMY      HYSTERECTOMY      TONSILLECTOMY       Family History       Problem Relation (Age of Onset)    Tremor Mother          Tobacco Use    Smoking status: Former     Types: Cigarettes     Passive exposure: Never     Smokeless tobacco: Never   Substance and Sexual Activity    Alcohol use: Not on file    Drug use: Not on file    Sexual activity: Not on file     Review of Systems  Objective:        There is no height or weight on file to calculate BMI.  Vital Signs (Most Recent):    Vital Signs (24h Range):                      Physical Exam  Neurosurgery Physical Exam  On physical examination she is a well-developed, well-nourished white lady who is awake and cooperative.  Examination of the head is unremarkable.  Eyes show full extraocular movements, pupils are equal reactive to light and fundi show clear disc margins.  Hearing is intact to finger rubbing bilaterally.  On neurological examination she is able to provide a good history.  She shows a fine tremor of the outstretched upper extremities worse on the left and this is somewhat increased with finger-to-nose testing.  There is no associated rigidity.  Gait is a little slow but not ataxic.  Cranial nerves otherwise intact, she has normal facial sensation and movement and the tongue protrudes in the midline.  She shows good strength in the extremities, normal sensation, rather hyperactive deep tendon reflexes.       Significant Labs:  No results for input(s): GLU, NA, K, CL, CO2, BUN, CREATININE, CALCIUM, MG in the last 48 hours.  No results for input(s): WBC, HGB, HCT, PLT in the last 48 hours.  No results for input(s): LABPT, INR, APTT in the last 48 hours.  Microbiology Results (last 7 days)       ** No results found for the last 168 hours. **          All pertinent labs from the last 24 hours have been reviewed.    Significant Diagnostics:  I have reviewed all pertinent imaging results/findings within the past 24 hours.    Assessment and Plan:     Essential tremor      68 y/o F with tremor. We discussed the procedure for deep brain stimulation.  We are planning bilateral implantation of DBS electrodes in the VIM thalamic nuclei.  Stereotaxy is employed using MRI for  the final position of the electrode tip and CT scan with temporary fiducial screws used to register the system.  Microelectrode recording and stimulation are done during operation to assure good placement.  The electrodes will be buried under the scalp and she will return the following week for insertion of the pulse generator under general anesthesia.  We are planning operation for for 4/18/2023.             Ollie Vera MD  Neurosurgery  [facility] - Surgery (2nd Fl)

## 2023-04-18 NOTE — PLAN OF CARE
Chart reviewed. Preop nursing care completed per orders. Safe surgery checklist complete. Family at bedside and to take belongings. Waiting for update to H&P, anesthesia consent prior to surgery. Call bell within reach. Instructed pt to call for assistance.

## 2023-04-18 NOTE — ASSESSMENT & PLAN NOTE
68 y/o F with tremor. We discussed the procedure for deep brain stimulation.  We are planning bilateral implantation of DBS electrodes in the VIM thalamic nuclei.  Stereotaxy is employed using MRI for the final position of the electrode tip and CT scan with temporary fiducial screws used to register the system.  Microelectrode recording and stimulation are done during operation to assure good placement.  The electrodes will be buried under the scalp and she will return the following week for insertion of the pulse generator under general anesthesia.  We are planning operation for for 4/18/2023.

## 2023-04-18 NOTE — TRANSFER OF CARE
"Anesthesia Transfer of Care Note    Patient: Anna Foster    Procedure(s) Performed: Procedure(s) (LRB):  INSERTION, FIDUCIAL SCREW, SKULL/DBS (N/A)  INSERTION, DEEP BRAIN STIMULATOR (Left)    Patient location: PACU    Anesthesia Type: general    Transport from OR: Transported from OR on room air with adequate spontaneous ventilation    Post pain: adequate analgesia    Post assessment: no apparent anesthetic complications    Post vital signs: stable    Level of consciousness: awake    Nausea/Vomiting: no nausea/vomiting    Complications: none    Transfer of care protocol was followed      Last vitals:   Visit Vitals  BP (!) 109/58   Pulse 94   Temp 36.8 °C (98.2 °F) (Temporal)   Resp 17   Ht 5' 3" (1.6 m)   Wt 90.7 kg (200 lb)   SpO2 (!) 93%   Breastfeeding No   BMI 35.43 kg/m²     "

## 2023-04-18 NOTE — NURSING TRANSFER
Nursing Transfer Note      4/18/2023     Reason patient is being transferred: post op    Transfer To: 911    Transfer via stretcher    Transfer with n/a    Transported by pct    Medicines sent: n/a    Any special needs or follow-up needed: CT en route to room    Chart send with patient: Yes    Notified: family at bedside    Patient reassessed at: 4/18/2023 @ 1526

## 2023-04-18 NOTE — PLAN OF CARE
Patient walked to bed from stretcher with x1 assist. Patient was oriented to the unit and was informed of plan of care. Patient has no requests or concerns at this time. Patient remains in bed with family at bedside, bed in lowest position, side rails up x2, call light in reach. Will continue with plan of care.

## 2023-04-18 NOTE — ANESTHESIA PREPROCEDURE EVALUATION
04/18/2023  Anna Wagoner is a 67 y.o., female.      Pre-op Assessment    I have reviewed the Patient Summary Reports.     I have reviewed the Nursing Notes. I have reviewed the NPO Status.   I have reviewed the Medications.     Review of Systems  Anesthesia Hx:  No problems with previous Anesthesia  History of prior surgery of interest to airway management or planning: Previous anesthesia: General  Denies Personal Hx of Anesthesia complications.   Cardiovascular:   Hypertension    Pulmonary:  Pulmonary Normal    Renal/:  Renal/ Normal     Hepatic/GI:  Hepatic/GI Normal    Musculoskeletal:   Arthritis     Neurological:  Neurology Normal    Endocrine:   Hypothyroidism      Patient Active Problem List   Diagnosis    Essential tremor    Anxiety    Cervical spinal stenosis    Cervical spondylosis without myelopathy    Hypercholesterolemia    Hypothyroidism    RLS (restless legs syndrome)    Statin intolerance    Tremor    Vitamin D deficiency    Neck pain     Past Medical History:   Diagnosis Date    Anxiety     Hashimoto's thyroiditis     Hypertension     Neck pain, chronic      Past Surgical History:   Procedure Laterality Date    BREAST SURGERY      CHOLECYSTECTOMY      HYSTERECTOMY      TONSILLECTOMY           Physical Exam  General: Well nourished, Cooperative and Alert    Airway:  Mallampati: II   Mouth Opening: Normal  TM Distance: Normal  Tongue: Normal  Neck ROM: Normal ROM    Dental:  Intact    Chest/Lungs:  Clear to auscultation, Normal Respiratory Rate    Heart:  Rate: Normal  Rhythm: Regular Rhythm  Sounds: Normal    No results found for: WBC, HGB, HCT, MCV, PLT    BMP  No results found for: NA, K, CL, CO2, BUN, CREATININE, CALCIUM, ANIONGAP, EGFRNORACEVR      Anesthesia Plan  Type of Anesthesia, risks & benefits discussed:    Anesthesia Type: Gen Natural Airway, Gen ETT,  MAC  Intra-op Monitoring Plan: Standard ASA Monitors  Post Op Pain Control Plan: multimodal analgesia  Induction:  IV  Airway Plan: Direct, Post-Induction  Informed Consent: Informed consent signed with the Patient and all parties understand the risks and agree with anesthesia plan.  All questions answered.   ASA Score: 3  Day of Surgery Review of History & Physical: H&P Update referred to the surgeon/provider.    Ready For Surgery From Anesthesia Perspective.     .

## 2023-04-19 VITALS
BODY MASS INDEX: 35.44 KG/M2 | DIASTOLIC BLOOD PRESSURE: 56 MMHG | OXYGEN SATURATION: 94 % | SYSTOLIC BLOOD PRESSURE: 103 MMHG | HEART RATE: 65 BPM | HEIGHT: 63 IN | TEMPERATURE: 98 F | RESPIRATION RATE: 16 BRPM | WEIGHT: 200 LBS

## 2023-04-19 LAB
ANION GAP SERPL CALC-SCNC: 8 MMOL/L (ref 8–16)
BASOPHILS # BLD AUTO: 0.04 K/UL (ref 0–0.2)
BASOPHILS NFR BLD: 0.3 % (ref 0–1.9)
BUN SERPL-MCNC: 9 MG/DL (ref 8–23)
CALCIUM SERPL-MCNC: 8.9 MG/DL (ref 8.7–10.5)
CHLORIDE SERPL-SCNC: 108 MMOL/L (ref 95–110)
CO2 SERPL-SCNC: 25 MMOL/L (ref 23–29)
CREAT SERPL-MCNC: 0.8 MG/DL (ref 0.5–1.4)
DIFFERENTIAL METHOD: ABNORMAL
EOSINOPHIL # BLD AUTO: 0 K/UL (ref 0–0.5)
EOSINOPHIL NFR BLD: 0.1 % (ref 0–8)
ERYTHROCYTE [DISTWIDTH] IN BLOOD BY AUTOMATED COUNT: 14 % (ref 11.5–14.5)
EST. GFR  (NO RACE VARIABLE): >60 ML/MIN/1.73 M^2
GLUCOSE SERPL-MCNC: 115 MG/DL (ref 70–110)
HCT VFR BLD AUTO: 38 % (ref 37–48.5)
HGB BLD-MCNC: 12.2 G/DL (ref 12–16)
IMM GRANULOCYTES # BLD AUTO: 0.04 K/UL (ref 0–0.04)
IMM GRANULOCYTES NFR BLD AUTO: 0.3 % (ref 0–0.5)
LYMPHOCYTES # BLD AUTO: 2.2 K/UL (ref 1–4.8)
LYMPHOCYTES NFR BLD: 16.2 % (ref 18–48)
MAGNESIUM SERPL-MCNC: 1.9 MG/DL (ref 1.6–2.6)
MCH RBC QN AUTO: 28.5 PG (ref 27–31)
MCHC RBC AUTO-ENTMCNC: 32.1 G/DL (ref 32–36)
MCV RBC AUTO: 89 FL (ref 82–98)
MONOCYTES # BLD AUTO: 1.1 K/UL (ref 0.3–1)
MONOCYTES NFR BLD: 7.8 % (ref 4–15)
NEUTROPHILS # BLD AUTO: 10.1 K/UL (ref 1.8–7.7)
NEUTROPHILS NFR BLD: 75.3 % (ref 38–73)
NRBC BLD-RTO: 0 /100 WBC
PHOSPHATE SERPL-MCNC: 3.5 MG/DL (ref 2.7–4.5)
PLATELET # BLD AUTO: 255 K/UL (ref 150–450)
PMV BLD AUTO: 11.3 FL (ref 9.2–12.9)
POTASSIUM SERPL-SCNC: 3.9 MMOL/L (ref 3.5–5.1)
RBC # BLD AUTO: 4.28 M/UL (ref 4–5.4)
SODIUM SERPL-SCNC: 141 MMOL/L (ref 136–145)
WBC # BLD AUTO: 13.43 K/UL (ref 3.9–12.7)

## 2023-04-19 PROCEDURE — 63600175 PHARM REV CODE 636 W HCPCS: Performed by: STUDENT IN AN ORGANIZED HEALTH CARE EDUCATION/TRAINING PROGRAM

## 2023-04-19 PROCEDURE — 80048 BASIC METABOLIC PNL TOTAL CA: CPT | Performed by: STUDENT IN AN ORGANIZED HEALTH CARE EDUCATION/TRAINING PROGRAM

## 2023-04-19 PROCEDURE — 97116 GAIT TRAINING THERAPY: CPT

## 2023-04-19 PROCEDURE — 99024 POSTOP FOLLOW-UP VISIT: CPT | Mod: POP,,, | Performed by: PHYSICIAN ASSISTANT

## 2023-04-19 PROCEDURE — 25000003 PHARM REV CODE 250: Performed by: STUDENT IN AN ORGANIZED HEALTH CARE EDUCATION/TRAINING PROGRAM

## 2023-04-19 PROCEDURE — 99024 PR POST-OP FOLLOW-UP VISIT: ICD-10-PCS | Mod: POP,,, | Performed by: PHYSICIAN ASSISTANT

## 2023-04-19 PROCEDURE — 25000003 PHARM REV CODE 250: Performed by: PHYSICIAN ASSISTANT

## 2023-04-19 PROCEDURE — 85025 COMPLETE CBC W/AUTO DIFF WBC: CPT | Performed by: STUDENT IN AN ORGANIZED HEALTH CARE EDUCATION/TRAINING PROGRAM

## 2023-04-19 PROCEDURE — 97165 OT EVAL LOW COMPLEX 30 MIN: CPT

## 2023-04-19 PROCEDURE — 97535 SELF CARE MNGMENT TRAINING: CPT

## 2023-04-19 PROCEDURE — 97161 PT EVAL LOW COMPLEX 20 MIN: CPT

## 2023-04-19 PROCEDURE — 36415 COLL VENOUS BLD VENIPUNCTURE: CPT | Performed by: STUDENT IN AN ORGANIZED HEALTH CARE EDUCATION/TRAINING PROGRAM

## 2023-04-19 PROCEDURE — 84100 ASSAY OF PHOSPHORUS: CPT | Performed by: STUDENT IN AN ORGANIZED HEALTH CARE EDUCATION/TRAINING PROGRAM

## 2023-04-19 PROCEDURE — 83735 ASSAY OF MAGNESIUM: CPT | Performed by: STUDENT IN AN ORGANIZED HEALTH CARE EDUCATION/TRAINING PROGRAM

## 2023-04-19 RX ORDER — HYDROCODONE BITARTRATE AND ACETAMINOPHEN 10; 325 MG/1; MG/1
1 TABLET ORAL EVERY 6 HOURS PRN
Qty: 28 TABLET | Refills: 0 | Status: SHIPPED | OUTPATIENT
Start: 2023-04-19 | End: 2023-04-19 | Stop reason: HOSPADM

## 2023-04-19 RX ORDER — ONDANSETRON 4 MG/1
4 TABLET, ORALLY DISINTEGRATING ORAL EVERY 8 HOURS PRN
Qty: 30 TABLET | Refills: 0 | Status: SHIPPED | OUTPATIENT
Start: 2023-04-19

## 2023-04-19 RX ORDER — HYDROCODONE BITARTRATE AND ACETAMINOPHEN 5; 325 MG/1; MG/1
1 TABLET ORAL EVERY 6 HOURS PRN
Qty: 28 TABLET | Refills: 0 | Status: ON HOLD | OUTPATIENT
Start: 2023-04-19 | End: 2023-04-25 | Stop reason: HOSPADM

## 2023-04-19 RX ORDER — HYDROCODONE BITARTRATE AND ACETAMINOPHEN 5; 325 MG/1; MG/1
1 TABLET ORAL EVERY 6 HOURS PRN
Status: DISCONTINUED | OUTPATIENT
Start: 2023-04-19 | End: 2023-04-19 | Stop reason: HOSPADM

## 2023-04-19 RX ADMIN — TIZANIDINE 4 MG: 4 TABLET ORAL at 08:04

## 2023-04-19 RX ADMIN — HYDROCODONE BITARTRATE AND ACETAMINOPHEN 1 TABLET: 5; 325 TABLET ORAL at 02:04

## 2023-04-19 RX ADMIN — MORPHINE SULFATE 4 MG: 2 INJECTION, SOLUTION INTRAMUSCULAR; INTRAVENOUS at 02:04

## 2023-04-19 RX ADMIN — ATORVASTATIN CALCIUM 10 MG: 10 TABLET, FILM COATED ORAL at 08:04

## 2023-04-19 RX ADMIN — LIOTHYRONINE SODIUM 5 MCG: 5 TABLET ORAL at 07:04

## 2023-04-19 RX ADMIN — CARVEDILOL 12.5 MG: 12.5 TABLET, FILM COATED ORAL at 08:04

## 2023-04-19 RX ADMIN — SERTRALINE 100 MG: 100 TABLET, FILM COATED ORAL at 08:04

## 2023-04-19 RX ADMIN — ONDANSETRON 4 MG: 2 INJECTION INTRAMUSCULAR; INTRAVENOUS at 08:04

## 2023-04-19 RX ADMIN — HYDROCODONE BITARTRATE AND ACETAMINOPHEN 1 TABLET: 10; 325 TABLET ORAL at 08:04

## 2023-04-19 RX ADMIN — PANTOPRAZOLE SODIUM 40 MG: 40 TABLET, DELAYED RELEASE ORAL at 08:04

## 2023-04-19 NOTE — PT/OT/SLP EVAL
Occupational Therapy   Evaluation    Name: Anna Wagoner  MRN: [id number]  Admitting Diagnosis: Essential tremor  Recent Surgery: Procedure(s) (LRB):  INSERTION, FIDUCIAL SCREW, SKULL/DBS (N/A)  INSERTION, DEEP BRAIN STIMULATOR (Left) 1 Day Post-Op    Recommendations:     Discharge Recommendations: home  Discharge Equipment Recommendations:  none  Barriers to discharge:  None    Assessment:     Anna Wagoner is a 67 y.o. female with a medical diagnosis of Essential tremor.  She presents with performance deficits affecting function: impaired self care skills, impaired functional mobility, decreased coordination.  Pt participates well and is motivated to regain functional independence in mobility and self care.      Rehab Prognosis: Good; patient would benefit from acute skilled OT services to address these deficits and reach maximum level of function.       Plan:     Patient to be seen 3 x/week to address the above listed problems via self-care/home management, therapeutic activities, therapeutic exercises, neuromuscular re-education  Plan of Care Expires:    Plan of Care Reviewed with: patient, daughter    Subjective     Chief Complaint: Discomfort  Patient/Family Comments/goals: Go home    Occupational Profile:  Living Environment: Pt lives alone, but will d/c to daughter's 1SH with 3 LAZARUS and WIS.  Previous level of function: Independent, drives, works as an   Roles and Routines: Work  Equipment Used at Home: none  Assistance upon Discharge: Available    Pain/Comfort:  Pain Rating 1: 2/10  Location - Orientation 1: generalized  Location 1: head  Pain Addressed 1: Distraction  Pain Rating Post-Intervention 1: 0/10    Patients cultural, spiritual, Zoroastrianism conflicts given the current situation: no    Objective:     Communicated with: RN prior to session.  Patient found HOB elevated with telemetry upon OT entry to room.    General Precautions: Standard, fall  Orthopedic Precautions: N/A  Braces:  N/A  Respiratory Status: Room air    Occupational Performance:    Bed Mobility:    Patient completed Supine to Sit with modified independence    Functional Mobility/Transfers:  Patient completed Sit <> Stand Transfer with stand by assistance  with  no assistive device   Patient completed Toilet Transfer Step Transfer technique with stand by assistance with  no AD  Functional Mobility: SBA    Activities of Daily Living:  Grooming: stand by assistance standing at sink  Upper Body Dressing: stand by assistance gown  Lower Body Dressing: stand by assistance pants, slip on shoes  Toileting: supervision at toilet    Cognitive/Visual Perceptual:  Cognitive/Psychosocial Skills:     -       Oriented to: Person, Place, Time, and Situation   -       Follows Commands/attention:Follows multistep  commands  -       Safety awareness/insight to disability: intact     Physical Exam:  Upper Extremity Range of Motion:     -       Right Upper Extremity: WFL  -       Left Upper Extremity: WFL  Upper Extremity Strength:    -       Right Upper Extremity: WFL  -       Left Upper Extremity: WFL   Strength:    -       Right Upper Extremity: WFL  -       Left Upper Extremity: WFL  Fine Motor Coordination:    -       Impaired  slightly tremulous  Gross motor coordination:   WFL    AMPAC 6 Click ADL:  AMPAC Total Score: 19    Treatment & Education:  Pt edu re OT role, POC and safety.  Pt performed the above ADLs, with min cues for safety.    Patient left sitting edge of bed with call button in reach, RN notified, and PT and daughter present    GOALS:   Multidisciplinary Problems       Occupational Therapy Goals          Problem: Occupational Therapy    Goal Priority Disciplines Outcome Interventions   Occupational Therapy Goal     OT, PT/OT Ongoing, Progressing    Description: Goals to be met by: 4/29/23    Patient will increase functional independence with ADLs by performing:    UE Dressing with [place].  LE Dressing with  Ray.  Grooming while standing at sink with Ray.  Toileting from toilet with Ray for hygiene and clothing management.   Supine to sit with Ray.  Step transfer with Ray to Mod I.                         History:     Past Medical History:   Diagnosis Date    Anxiety     Hashimoto's thyroiditis     Hypertension     Neck pain, chronic          Past Surgical History:   Procedure Laterality Date    BREAST SURGERY      CHOLECYSTECTOMY      DEEP BRAIN STIMULATOR PLACEMENT Left 4/18/2023    Procedure: INSERTION, DEEP BRAIN STIMULATOR;  Surgeon: Bonifacio Kraft MD;  Location: [facility] OR [address] [place] [address];  Service: Neurosurgery;  Laterality: Left;  INSERTION ELECTRODES FOR DEEP BRAIN STIMULATION/ BILATERAL VENTRALIS INTERMEDIUS/ LEFT SIDE FIRST, RIGHT SIDE SECOND/ TEDS & SCD/ [facility].    HYSTERECTOMY      PLACEMENT OF FIDUCIAL SCREW INTO SPINE N/A 4/18/2023    Procedure: INSERTION, FIDUCIAL SCREW, SKULL/DBS;  Surgeon: Bonifacio Kraft MD;  Location: [facility] OR [address] [place] [address];  Service: Neurosurgery;  Laterality: N/A;  APPLICATION OF FIDUCIALS FOR DEEP BRAIN STIMULATION/ TEDS & SCD/ [facility].    TONSILLECTOMY         Time Tracking:     OT Date of Treatment: 04/19/23  OT Start Time: 1128  OT Stop Time: 1150  OT Total Time (min): 22 min    Billable Minutes:Evaluation 14 minutes  Self Care/Home Management 8 minutes    BETTY Sifuentes  4/19/2023  Pager: [phone]

## 2023-04-19 NOTE — PLAN OF CARE
PT D/Gennaro due to pt able to perform functional mobility including up/down steps. Pt's daughter present and educated in assisting pt on the steps. Chinyere Cedillo PT 4/19/23

## 2023-04-19 NOTE — ASSESSMENT & PLAN NOTE
66 y/o F with tremor. We discussed the procedure for deep brain stimulation.  We are planning bilateral implantation of DBS electrodes in the VIM thalamic nuclei.  Stereotaxy is employed using MRI for the final position of the electrode tip and CT scan with temporary fiducial screws used to register the system.  Microelectrode recording and stimulation are done during operation to assure good placement.  The electrodes will be buried under the scalp and she will return the following week for insertion of the pulse generator under general anesthesia.  We are planning operation for for 4/18/2023.

## 2023-04-19 NOTE — PT/OT/SLP EVAL
"Physical Therapy Evaluation/partial co eval/ D/C Summary    Patient Name:  Anna Wagoner   MRN:  [id number]    Recommendations:     Discharge Recommendations: home (pt staying with her daughter upon D/C)   Discharge Equipment Recommendations: none   Barriers to discharge: None    Assessment:     Anna Wagoner is a 67 y.o. female admitted with a medical diagnosis of Essential tremor.  She presents with the following impairments/functional limitations: impaired functional mobility . PT educated pt and her daughter in safety with mobility in the home upon D/C and the proper technique for pt's daughter to assist pt going up/down steps. They expressed understanding.    Recent Surgery: Procedure(s) (LRB):  INSERTION, FIDUCIAL SCREW, SKULL/DBS (N/A)  INSERTION, DEEP BRAIN STIMULATOR (Left) 1 Day Post-Op    Plan:       (PT D/Gennaro due to pt able to perform gait and daughter able to assist pt on steps as needed)   Plan of Care Expires:  05/19/23    Subjective   "I am scared of heights, I don't like stairwells" (pt agreeable to perform steps with PT blocking her view of the steps going down)  Pain/Comfort:  Pain Rating 1: 0/10  Pain Rating Post-Intervention 1: 0/10    Patients cultural, spiritual, Rastafari conflicts given the current situation: no    Living Environment:  Pt lives alone in a 1 story home with 3 LAZARUS with B UE rails. (Pt will be staying with her daughter who has a 1 story home with 3 LAZARUS with rails and 1 step into a sun room within the home)  Prior to admission, patients level of function was independent, driving.  Equipment used at home: none.   Upon discharge, patient will have assistance from daughter.    Objective:     Communicated with nurse prior to session.  Patient found sitting edge of bed with telemetry  upon PT entry to room.    General Precautions: Standard, fall  Orthopedic Precautions:N/A   Braces: N/A  Respiratory Status: Room air    Exams:  Cognitive Exam:  Patient is oriented to Person and " Place  Sensation:    -       Intact  light/touch B LE  RLE ROM: WFL  RLE Strength: WFL  LLE ROM: WFL  LLE Strength: WFL    Functional Mobility:  Transfers:     Sit to Stand:  supervision with no AD  Bed to Chair: stand by assistance with  no AD  using  Stand Pivot  Gait: 125ft with no AD with SBA due to pt with mild R sided lean at times but no LOB.   Stairs:  Pt ascended/descended 3 stair(s) with No Assistive Device with right handrail with Contact Guard Assistance. Pt's daughter present and educated in assisting pt  Co-treat with OT due to pt sleeping in early am and needed to be seen by PT/OT.   AM-PAC 6 CLICK MOBILITY  Total Score:22     Patient left up in chair with all lines intact, call button in reach, chair alarm on, nurse notified, and daughter present.    GOALS:   Multidisciplinary Problems       Physical Therapy Goals       Not on file              Multidisciplinary Problems (Resolved)          Problem: Physical Therapy    Goal Priority Disciplines Outcome Goal Variances Interventions   Physical Therapy Goal   (Resolved)     PT, PT/OT Met                         History:     Past Medical History:   Diagnosis Date    Anxiety     Hashimoto's thyroiditis     Hypertension     Neck pain, chronic        Past Surgical History:   Procedure Laterality Date    BREAST SURGERY      CHOLECYSTECTOMY      DEEP BRAIN STIMULATOR PLACEMENT Left 4/18/2023    Procedure: INSERTION, DEEP BRAIN STIMULATOR;  Surgeon: Bonifacio Kraft MD;  Location: [facility] OR [address] [place] [address];  Service: Neurosurgery;  Laterality: Left;  INSERTION ELECTRODES FOR DEEP BRAIN STIMULATION/ BILATERAL VENTRALIS INTERMEDIUS/ LEFT SIDE FIRST, RIGHT SIDE SECOND/ TEDS & SCD/ [facility], SACHI MENCHACA.    HYSTERECTOMY      PLACEMENT OF FIDUCIAL SCREW INTO SPINE N/A 4/18/2023    Procedure: INSERTION, FIDUCIAL SCREW, SKULL/DBS;  Surgeon: Bonifacio Kraft MD;  Location: [facility] OR [address] [place] [address];  Service: Neurosurgery;  Laterality: N/A;  APPLICATION OF FIDUCIALS FOR DEEP BRAIN  STIMULATION/ TEDS & SCD/ [facility]TRON[facility], SACHI MENCHACA.    TONSILLECTOMY         Time Tracking:     PT Received On: 04/19/23  PT Start Time: 1138     PT Stop Time: 1200 (PT added time due to pt sleeping 1st attempt and daughter able to give home situation information and pt's prior level of function 10:28 am -10:34 am)  PT Total Time (min): 22 min     Billable Minutes: Evaluation 12 and Gait Training 10      04/19/2023

## 2023-04-19 NOTE — DISCHARGE INSTRUCTIONS
Post op Craniotomy Patient Information    -No driving until released by Dr. Kraft  -Do not take any OTC products containing acetaminophen at the same time as you take your narcotic pain medication. Medications that may contain acetaminophen include but are not limited to: Excedrin and other headache medications, arthritis medications, cold and sinus medications, etc. Please review the list of active ingredients in any OTC medication prior to taking it.  -Do not take any Aspirin or Aspirin containing products for 2 weeks after surgery.  -Do not take any Aleeve, Naprosyn, Naproxen, Ibuprofen, Advil or any other NSAID for 2 weeks after surgery.  -Do not take any herbal supplements for 2 weeks after surgery.   -Do not consume any alcoholic beverages until released by your Neurosurgeon  -Do not perform any excessive bending over or leaning forward as this is a fall hazard.  -Do not perform any heavy lifting or lifting more than 10 lbs from the ground level as this is a fall hazard.    Contact the Neurosurgery Office immediately if:  -If you begin to notice any neurologic changes such as:           -Sudden onset of lethargy or sleepiness           -Sudden confusion, trouble speaking, or understanding            -Sudden trouble seeing in one or both eyes            -Sudden trouble walking, dizziness, loss of coordination            -Sudden severe headache with no known cause            -Sudden onset of numbness or weakness     Wound Care:  Remove your head dressing tomorrow. After removal keep your incisions open to air. You may shower on the 2nd day after your surgery. Keep the incision clean and dry at all times. Do not allow the force of water to hit the incision. If the incision gets damp, pat it dry. Do not rub or scrub the incision. You cannot take a bath/swim/submerge the incision until 8 weeks after surgery.    Apply Bacitracin ointment (over the counter) to incisions twice daily.    Call your doctor or go to the  Emergency Room for any signs of infection including: increased redness, drainage, pain or fever (temperature greater than or equal to 101.4).       Miscellaneous:  -Follow up with Dr. Kraft for generator placement 4/25. Wound check appointment will be 2 weeks after this. We will discuss placement of the right DBS electrode in the next 2-3 months.       Neurosurgery Office: [phone]

## 2023-04-19 NOTE — HOSPITAL COURSE
4/19: The patient tolerated the procedure well and there were no intra-operative complications. After surgery, the patient was extubated and taken to recovery in stable condition.  After observation in recovery, the patient was transferred to the neurosurgical floor.  Post-op imaging stable. AFVSS. Labs stable. Drowsy this morning, difficulty holding conversation., no focal deficits. Received Norco 10 and IV morphine overnight and this morning. PT/OT were consulted, the patient progressed, and was cleared to go home with no DME needs.      General: well developed, well nourished, no distress.   Neurologic: Alert and oriented. Thought content appropriate.  GCS: Motor: 6/Verbal: 5/Eyes: 4 GCS Total: 15  Mental Status: Awake, Alert, Oriented x 4  Language: No aphasia  Speech: No dysarthria  Cranial nerves: face symmetric, tongue midline, CN II-XII grossly intact.   Eyes: pupils equal, round, reactive to light with accomodation, EOMI.   Pulmonary: normal respirations, no signs of respiratory distress  Abdomen: soft, non-distended, not tender to palpation  Sensory: intact to light touch throughout  Motor Strength: Moves all extremities spontaneously with good tone.  Full strength upper and lower extremities. No abnormal movements seen.   Pronator Drift: no drift noted  Finger-to-nose: Intact bilaterally  Skin: Skin is warm, dry and intact.  Head wrap in place.     On 4/19 she was discharged home with pain medication and follow up appointments. The patient was tolerating a regular diet and voiding without difficulty. Activity as tolerated. At the time of discharge, the patient was neurologically stable, was afebrile, and vital signs were stable. Discharge instructions were given verbally/written to the patient and their family, including wound care and follow-up appointments, and all of their questions were answered. The patient was also given a discharge instruction sheet explaining the aforementioned discussion.  The  patient verbalized understanding of instructions and agreed to the plan. They were encouraged to call the clinic with any questions they might have prior to the follow up appointments.

## 2023-04-19 NOTE — PLAN OF CARE
[facility] (Hospital)  Discharge Final Note    Primary Care Provider: Victor Hugo Evans MD    Expected Discharge Date: 4/19/2023    Patient to be discharged home.  The patient does not have any home needs.  Family to provide transportation home.  Neurosurgery clinic to schedule follow up appointment.    Future Appointments   Date Time Provider Department Center   5/4/2023  2:30 PM Bonifacio Kraft MD [facility]        Final Discharge Note (most recent)       Final Note - 04/19/23 1427          Final Note    Assessment Type Final Discharge Note     Anticipated Discharge Disposition Home or Self Care        Post-Acute Status    Post-Acute Authorization Other     Other Status No Post-Acute Service Needs     Discharge Delays None known at this time                     Important Message from Medicare

## 2023-04-19 NOTE — PLAN OF CARE
Problem: Occupational Therapy  Goal: Occupational Therapy Goal  Description: Goals to be met by: 4/29/23    Patient will increase functional independence with ADLs by performing:    UE Dressing with [place].  LE Dressing with [place].  Grooming while standing at sink with [place].  Toileting from toilet with [place] for hygiene and clothing management.   Supine to sit with [place].  Step transfer with [place] to Mod I.    Outcome: Ongoing, Progressing     OT eval completed. The above goals are established to improve functional (I) and mobility.

## 2023-04-19 NOTE — PLAN OF CARE
[facility] ([facility])  Discharge Assessment    Primary Care Provider: Victor Hugo Evans MD     Discharge Assessment (most recent)       BRIEF DISCHARGE ASSESSMENT - 04/19/23 2945          Discharge Planning    Assessment Type Discharge Planning Brief Assessment     Resource/Environmental Concerns none     Support Systems Children     Current Living Arrangements home     Patient/Family Anticipates Transition to home with family     Patient/Family Anticipated Services at Transition none     DME Needed Upon Discharge  none     Discharge Plan A Home with family     Discharge Plan B Home with family;Home Health

## 2023-04-19 NOTE — DISCHARGE SUMMARY
[facility] ([facility])  Neurosurgery  Discharge Summary      Patient Name: Anna Wagoner  MRN: [id number]  Admission Date: 4/18/2023  Hospital Length of Stay: 1 days  Discharge Date and Time:  04/19/2023 1:18 PM  Attending Physician: Bonifacio Kraft MD   Discharging Provider: Jailene Velasquez PA-C  Primary Care Provider: Victor Hugo Evans MD    HPI:   Anna Wagoner was seen in neurosurgical consultation at the office this morning.  She is a 67-year-old lady with an almost lifelong history of tremor in her hands beginning as a child.  She was able to function most of her life but in the last few years tremor has gradually increased to where it is affecting her work and daily activities.  She has been followed [facility].  She is a  and the tremor is now affecting her ability to use a computer and her handwriting and personal grooming.  She has taken Mysoline and propanolol in the past which have helped a little but been associated with side effects.  She has noticed some tremor coming into her voice and some arrest of speech when she goes to say something.  She does feel that her balance is not as good.  She hopes to keep working for another few years and deep brain stimulation is now being considered for tremor management.  She does feel that she gets distracted a little more easily.  She feels her memory is perhaps not quite as good. She used to be very organized and now she sometimes jumps from one task to another.  Past medical history includes hypertension managed with Coreg and she is taking Synthroid replacement.  Review of systems is otherwise noncontributory.      Procedure(s) (LRB):  INSERTION, FIDUCIAL SCREW, SKULL/DBS (N/A)  INSERTION, DEEP BRAIN STIMULATOR (Left)     Hospital Course: 4/19: The patient tolerated the procedure well and there were no intra-operative complications. After surgery, the patient was extubated and taken to recovery in stable condition.   After observation in recovery, the patient was transferred to the neurosurgical floor.  Post-op imaging stable. AFVSS. Labs stable. Drowsy this morning, difficulty holding conversation., no focal deficits. Received Norco 10 and IV morphine overnight and this morning. PT/OT were consulted, the patient progressed, and was cleared to go home with no DME needs.      General: well developed, well nourished, no distress.   Neurologic: Alert and oriented. Thought content appropriate.  GCS: Motor: 6/Verbal: 5/Eyes: 4 GCS Total: 15  Mental Status: Awake, Alert, Oriented x 4  Language: No aphasia  Speech: No dysarthria  Cranial nerves: face symmetric, tongue midline, CN II-XII grossly intact.   Eyes: pupils equal, round, reactive to light with accomodation, EOMI.   Pulmonary: normal respirations, no signs of respiratory distress  Abdomen: soft, non-distended, not tender to palpation  Sensory: intact to light touch throughout  Motor Strength: Moves all extremities spontaneously with good tone.  Full strength upper and lower extremities. No abnormal movements seen.   Pronator Drift: no drift noted  Finger-to-nose: Intact bilaterally  Skin: Skin is warm, dry and intact.  Head wrap in place.     On 4/19 she was discharged home with pain medication and follow up appointments. The patient was tolerating a regular diet and voiding without difficulty. Activity as tolerated. At the time of discharge, the patient was neurologically stable, was afebrile, and vital signs were stable. Discharge instructions were given verbally/written to the patient and their family, including wound care and follow-up appointments, and all of their questions were answered. The patient was also given a discharge instruction sheet explaining the aforementioned discussion.  The patient verbalized understanding of instructions and agreed to the plan. They were encouraged to call the clinic with any questions they might have prior to the follow up  appointments.           Goals of Care Treatment Preferences:    Full code      Consults: PT/OT    Significant Diagnostic Studies: Labs:   BMP:   Recent Labs   Lab 04/19/23 0342   *      K 3.9      CO2 25   BUN 9   CREATININE 0.8   CALCIUM 8.9   MG 1.9   , CBC   Recent Labs   Lab 04/19/23 0343   WBC 13.43*   HGB 12.2   HCT 38.0       and All labs within the past 24 hours have been reviewed  Radiology:   CTH without contrast, XR skull    Pending Diagnostic Studies:     Procedure Component Value Units Date/Time    X-Ray Skull Ltd Less Than 4 Views [[id number]] Resulted: 04/18/23 1355    Order Status: Sent Lab Status: In process Updated: 04/18/23 1356        Final Active Diagnoses:    Diagnosis Date Noted POA    PRINCIPAL PROBLEM:  Essential tremor [G25.0] 07/27/2022 Yes      Problems Resolved During this Admission:      Discharged Condition: good     Disposition: Home or Self Care    Follow Up: 4/25 for generator placement     Patient Instructions:      Notify your health care provider if you experience any of the following:  temperature >100.4     Notify your health care provider if you experience any of the following:  persistent nausea and vomiting or diarrhea     Notify your health care provider if you experience any of the following:  severe uncontrolled pain     Notify your health care provider if you experience any of the following:  redness, tenderness, or signs of infection (pain, swelling, redness, odor or green/yellow discharge around incision site)     Notify your health care provider if you experience any of the following:  difficulty breathing or increased cough     Notify your health care provider if you experience any of the following:  severe persistent headache     Notify your health care provider if you experience any of the following:  worsening rash     Notify your health care provider if you experience any of the following:  persistent dizziness, light-headedness, or  visual disturbances     Notify your health care provider if you experience any of the following:  increased confusion or weakness     Activity as tolerated     Medications:  Reconciled Home Medications:      Medication List      START taking these medications    HYDROcodone-acetaminophen 5-325 mg per tablet  Commonly known as: NORCO  Take 1 tablet by mouth every 6 (six) hours as needed for Pain.     ondansetron 4 MG Tbdl  Commonly known as: ZOFRAN-ODT  Dissolve 1 tablet (4 mg total) by mouth every 8 (eight) hours as needed (nausea).        CONTINUE taking these medications    baclofen 10 MG tablet  Commonly known as: LIORESAL  Take 1 tablet (10 mg total) by mouth every evening.     buPROPion 300 MG 24 hr tablet  Commonly known as: WELLBUTRIN XL  Take 300 mg by mouth daily as needed.     busPIRone 15 MG tablet  Commonly known as: BUSPAR  Take 7.5-15 mg by mouth.     butalbital-acetaminophen-caffeine -40 mg -40 mg per tablet  Commonly known as: FIORICET, ESGIC  Take 1-2 tablets by mouth 3 (three) times daily as needed.     carvediloL 12.5 MG tablet  Commonly known as: COREG  Take 12.5 mg by mouth 2 (two) times daily.     cholecalciferol (vitamin D3) 75 mcg (3,000 unit) Tab  Take 1 tablet by mouth once daily.     COENZYME Q10 ORAL  Take 2 tablets by mouth once daily.     ergocalciferol 50,000 unit Cap  Commonly known as: ERGOCALCIFEROL  Take 50,000 Units by mouth every 7 days.     famotidine 20 MG tablet  Commonly known as: PEPCID  Take 20 mg by mouth.     levothyroxine 112 MCG tablet  Commonly known as: SYNTHROID  Take 112 mcg by mouth.     liothyronine 5 MCG Tab  Commonly known as: CYTOMEL  Take 5 mcg by mouth.     pantoprazole 40 MG tablet  Commonly known as: PROTONIX  Take 40 mg by mouth.     rosuvastatin 10 MG tablet  Commonly known as: CRESTOR  Take 10 mg by mouth once daily.     sertraline 100 MG tablet  Commonly known as: ZOLOFT  Take 100 mg by mouth once daily.     tiZANidine 4 MG tablet  Commonly  known as: ZANAFLEX  Take 4 mg by mouth 2 (two) times daily.        ASK your doctor about these medications    primidone 50 MG Tab  Commonly known as: MYSOLINE  Take 1 tablet (50 mg total) by mouth 2 (two) times a day.            Jailene Velasquez PA-C  Neurosurgery  [facility] - [facility] ([facility])

## 2023-04-20 ENCOUNTER — NURSE TRIAGE (OUTPATIENT)
Dept: ADMINISTRATIVE | Facility: CLINIC | Age: 68
End: 2023-04-20
Payer: MEDICARE

## 2023-04-20 ENCOUNTER — TELEPHONE (OUTPATIENT)
Dept: NEUROSURGERY | Facility: CLINIC | Age: 68
End: 2023-04-20
Payer: MEDICARE

## 2023-04-20 NOTE — ANESTHESIA POSTPROCEDURE EVALUATION
Anesthesia Post Evaluation    Patient: Anna Wagoner    Procedure(s) Performed: Procedure(s) (LRB):  INSERTION, FIDUCIAL SCREW, SKULL/DBS (N/A)  INSERTION, DEEP BRAIN STIMULATOR (Left)    Final Anesthesia Type: general      Patient location during evaluation: PACU  Patient participation: Yes- Able to Participate  Level of consciousness: awake and alert and oriented  Post-procedure vital signs: reviewed and stable  Pain management: adequate  Airway patency: patent    PONV status at discharge: No PONV  Anesthetic complications: no      Cardiovascular status: hemodynamically stable  Respiratory status: unassisted, spontaneous ventilation and room air  Hydration status: euvolemic  Follow-up not needed.          Vitals Value Taken Time   /83 04/19/23 1358   Temp 36.6 °C (97.8 °F) 04/19/23 1135   Pulse 76 04/19/23 1358   Resp 16 04/19/23 1422   SpO2 92 % 04/19/23 1359   Vitals shown include unvalidated device data.      Event Time   Out of Recovery 13:00:00         Pain/Yuni Score: Pain Rating Prior to Med Admin: 6 (4/19/2023  2:22 PM)  Pain Rating Post Med Admin: 2 (4/19/2023 10:00 AM)

## 2023-04-20 NOTE — TELEPHONE ENCOUNTER
----- Message from Renate Trejo sent at 4/20/2023  9:52 AM CDT -----  Regarding: on call nurse  Contact: [phone]  Delfina On Call Nurse calling regarding stated pt had surgery on 04/18, pt stated she is having headache on the left side. pain med only help for a short period of time. She also want to know how long should she expect the pain?  Can you follow up with the patient today to advise

## 2023-04-20 NOTE — TELEPHONE ENCOUNTER
Sw pt, she is overrall doing well, she is taking her pain medication as prescribed. I discussed taking a Tylenol x-tra strength in between doses. Pain will gradually get better in the next few days. She is to contact me if her pain does not improve.

## 2023-04-20 NOTE — TELEPHONE ENCOUNTER
Post discharge escalation day 1. Spoke with pt who states that she had brain surgery 4/18. States that she is having HA to left side where surgery to place, and unable to to find comfort when sleeping. States she has been taking hydrocodone medication q4 hours.States medication bring little relief. Advised will reach out to office.    Unable to speak with anyone in office High priority message sent to office regarding pt concern.    Pt informed verbalized understanding. Advised if no call back received should proceed to ER. Verbalized understanding.  Reason for Disposition   Caller has URGENT question and triager unable to answer question    Additional Information   Negative: Sounds like a life-threatening emergency to the triager   Negative: Bright red, wide-spread, sunburn-like rash   Negative: SEVERE headache and after spinal (epidural) anesthesia   Negative: Vomiting and persists > 4 hours   Negative: Vomiting and abdomen looks much more swollen than usual   Negative: Drinking very little and dehydration suspected (e.g., no urine > 12 hours, very dry mouth, very lightheaded)   Negative: Patient sounds very sick or weak to the triager   Negative: Sounds like a serious complication to the triager   Negative: Fever > 100.4 F (38.0 C)    Protocols used: Post-Op Symptoms and [id number]

## 2023-04-24 ENCOUNTER — ANESTHESIA EVENT (OUTPATIENT)
Dept: SURGERY | Facility: HOSPITAL | Age: 68
End: 2023-04-24
Payer: MEDICARE

## 2023-04-24 NOTE — ANESTHESIA PREPROCEDURE EVALUATION
[facility]  Anesthesia Pre-Operative Evaluation        Patient Name: Anna Wagoner  YOB: 1955  MRN: [id number]    SUBJECTIVE:     Pre-operative Evaluation for Procedure(s) (LRB):  INSERTION, PULSE GENERATOR, DEEP BRAIN STIMULATOR (Left)     04/24/2023    Anna Wagoner is a 67 y.o. female with a PMHx significant for anxiety, hypothyroidism, HLD, HTN, cervical spinal cord stenosis, and essential tremor s/p DBS placement on 4/18/2023.     She now presents for the above procedure(s).    Previous Airway: None documented.      Patient Active Problem List   Diagnosis    Essential tremor    Anxiety    Cervical spinal stenosis    Cervical spondylosis without myelopathy    Hypercholesterolemia    Hypothyroidism    RLS (restless legs syndrome)    Statin intolerance    Tremor    Vitamin D deficiency    Neck pain       Review of patient's allergies indicates:   Allergen Reactions    Metoprolol succinate Hives and Itching     Hives - wheals       Current Outpatient Medications   Medication Instructions    baclofen (LIORESAL) 10 mg, Oral, Nightly    buPROPion (WELLBUTRIN XL) 300 mg, Oral, Daily PRN    butalbital-acetaminophen-caffeine -40 mg (FIORICET, ESGIC) -40 mg per tablet 1-2 tablets, Oral, 3 times daily PRN    carvediloL (COREG) 12.5 mg, Oral, 2 times daily    cholecalciferol, vitamin D3, 75 mcg (3,000 unit) Tab 1 tablet, Oral, Daily    ergocalciferol (ERGOCALCIFEROL) 50,000 Units, Oral, Every 7 days    famotidine (PEPCID) 20 mg, Oral    HYDROcodone-acetaminophen (NORCO) 5-325 mg per tablet 1 tablet, Oral, Every 6 hours PRN    levothyroxine (SYNTHROID) 112 mcg, Oral    liothyronine (CYTOMEL) 5 mcg, Oral    ondansetron (ZOFRAN-ODT) 4 MG TbDL Dissolve 1 tablet (4 mg total) by mouth every 8 (eight) hours as needed (nausea).    pantoprazole (PROTONIX) 40 mg, Oral    primidone (MYSOLINE) 50 mg, Oral, 2 times daily    rosuvastatin (CRESTOR) 10 mg, Oral, Daily     sertraline (ZOLOFT) 100 mg, Oral, Daily    tiZANidine (ZANAFLEX) 4 mg, Oral, 2 times daily    ubidecarenone (COENZYME Q10 ORAL) 2 tablets, Oral, Daily       Past Surgical History:   Procedure Laterality Date    BREAST SURGERY      CHOLECYSTECTOMY      DEEP BRAIN STIMULATOR PLACEMENT Left 4/18/2023    Procedure: INSERTION, DEEP BRAIN STIMULATOR;  Surgeon: Bonifacio Kraft MD;  [facility];  Service: Neurosurgery;  Laterality: Left;  INSERTION ELECTRODES FOR DEEP BRAIN STIMULATION/ BILATERAL VENTRALIS INTERMEDIUS/ LEFT SIDE FIRST, RIGHT SIDE SECOND/ TEDS & SCD/ MEDTRONICSSACHI.    HYSTERECTOMY      PLACEMENT OF FIDUCIAL SCREW INTO SPINE N/A 4/18/2023    Procedure: INSERTION, FIDUCIAL SCREW, SKULL/DBS;  Surgeon: Bonifacio Kraft MD;  [facility];  Service: Neurosurgery;  Laterality: N/A;  APPLICATION OF FIDUCIALS FOR DEEP BRAIN STIMULATION/ TEDS & SCD/ MEDTRONICSSACHI.    TONSILLECTOMY         Social History     Substance and Sexual Activity   Drug Use Not on file     Alcohol Use: Not on file     Tobacco Use: Medium Risk    Smoking Tobacco Use: Former    Smokeless Tobacco Use: Never    Passive Exposure: Never       OBJECTIVE:     Vital Signs Range (Last 24H):         Significant Labs    Heme Profile  Lab Results   Component Value Date    WBC 13.43 (H) 04/19/2023    HGB 12.2 04/19/2023    HCT 38.0 04/19/2023     04/19/2023       Coagulation Studies  No results found for: LABPROT, INR, APTT    BMP  Lab Results   Component Value Date     04/19/2023    K 3.9 04/19/2023     04/19/2023    CO2 25 04/19/2023    BUN 9 04/19/2023    CREATININE 0.8 04/19/2023    MG 1.9 04/19/2023    PHOS 3.5 04/19/2023       Liver Function Tests  No results found for: AST, ALT, ALKPHOS, BILITOT, PROT, ALBUMIN    Lipid Profile  No results found for: CHOL, HDL, LDLDIRECT, TRIG    Endocrine Profile  Lab Results   Component Value Date    TSH 0.155 (L) 02/02/2023       Cardiac  Studies    EKG:   No results found for this or any previous visit.    TTE:  No results found for this or any previous visit.    ASSESSMENT/PLAN:       04/24/2023  Anna Wagoner is a 67 y.o., female.      Pre-op Assessment    I have reviewed the Patient Summary Reports.     I have reviewed the Nursing Notes. I have reviewed the NPO Status.   I have reviewed the Medications.     Review of Systems  Anesthesia Hx:  No problems with previous Anesthesia  History of prior surgery of interest to airway management or planning: Previous anesthesia: General  Denies Personal Hx of Anesthesia complications.   Cardiovascular:   Hypertension    Pulmonary:  Pulmonary Normal    Renal/:  Renal/ Normal     Hepatic/GI:  Hepatic/GI Normal    Musculoskeletal:   Arthritis     Neurological:  Neurology Normal    Endocrine:   Hypothyroidism        Physical Exam  General: Well nourished, Cooperative and Alert    Airway:  Mallampati: II   Mouth Opening: Normal  TM Distance: Normal  Tongue: Normal  Neck ROM: Extension Painful    Dental:  Intact        Anesthesia Plan  Type of Anesthesia, risks & benefits discussed:    Anesthesia Type: Gen ETT  Intra-op Monitoring Plan: Standard ASA Monitors  Post Op Pain Control Plan: multimodal analgesia  Induction:  IV  Airway Plan: Direct, Post-Induction  Informed Consent: Informed consent signed with the Patient and all parties understand the risks and agree with anesthesia plan.  All questions answered. Patient consented to blood products? Yes  ASA Score: 3  Day of Surgery Review of History & Physical: H&P Update referred to the surgeon/provider.I have interviewed and examined the patient. I have reviewed the patient's H&P dated: There are no significant changes.     Ready For Surgery From Anesthesia Perspective.     .

## 2023-04-25 ENCOUNTER — ANESTHESIA (OUTPATIENT)
Dept: SURGERY | Facility: HOSPITAL | Age: 68
End: 2023-04-25
Payer: MEDICARE

## 2023-04-25 ENCOUNTER — HOSPITAL ENCOUNTER (OUTPATIENT)
Facility: HOSPITAL | Age: 68
Discharge: HOME | End: 2023-04-25
Attending: NEUROLOGICAL SURGERY | Admitting: NEUROLOGICAL SURGERY
Payer: MEDICARE

## 2023-04-25 VITALS
WEIGHT: 203 LBS | DIASTOLIC BLOOD PRESSURE: 64 MMHG | HEART RATE: 84 BPM | SYSTOLIC BLOOD PRESSURE: 137 MMHG | RESPIRATION RATE: 14 BRPM | TEMPERATURE: 98 F | HEIGHT: 63 IN | OXYGEN SATURATION: 95 % | BODY MASS INDEX: 35.97 KG/M2

## 2023-04-25 DIAGNOSIS — G20.A1 PARKINSON'S DISEASE: ICD-10-CM

## 2023-04-25 LAB
ANION GAP SERPL CALC-SCNC: 12 MMOL/L (ref 8–16)
APTT PPP: 39.9 SEC (ref 21–32)
BASOPHILS # BLD AUTO: 0.05 K/UL (ref 0–0.2)
BASOPHILS NFR BLD: 0.6 % (ref 0–1.9)
BUN SERPL-MCNC: 17 MG/DL (ref 8–23)
CALCIUM SERPL-MCNC: 10.1 MG/DL (ref 8.7–10.5)
CHLORIDE SERPL-SCNC: 104 MMOL/L (ref 95–110)
CO2 SERPL-SCNC: 24 MMOL/L (ref 23–29)
CREAT SERPL-MCNC: 0.8 MG/DL (ref 0.5–1.4)
DIFFERENTIAL METHOD: NORMAL
EOSINOPHIL # BLD AUTO: 0.2 K/UL (ref 0–0.5)
EOSINOPHIL NFR BLD: 2.7 % (ref 0–8)
ERYTHROCYTE [DISTWIDTH] IN BLOOD BY AUTOMATED COUNT: 13.7 % (ref 11.5–14.5)
EST. GFR  (NO RACE VARIABLE): >60 ML/MIN/1.73 M^2
GLUCOSE SERPL-MCNC: 88 MG/DL (ref 70–110)
HCT VFR BLD AUTO: 42.2 % (ref 37–48.5)
HGB BLD-MCNC: 13.7 G/DL (ref 12–16)
IMM GRANULOCYTES # BLD AUTO: 0.03 K/UL (ref 0–0.04)
IMM GRANULOCYTES NFR BLD AUTO: 0.4 % (ref 0–0.5)
INR PPP: 4.4 (ref 0.8–1.2)
LYMPHOCYTES # BLD AUTO: 2.4 K/UL (ref 1–4.8)
LYMPHOCYTES NFR BLD: 31 % (ref 18–48)
MCH RBC QN AUTO: 28.8 PG (ref 27–31)
MCHC RBC AUTO-ENTMCNC: 32.5 G/DL (ref 32–36)
MCV RBC AUTO: 89 FL (ref 82–98)
MONOCYTES # BLD AUTO: 0.6 K/UL (ref 0.3–1)
MONOCYTES NFR BLD: 7.8 % (ref 4–15)
NEUTROPHILS # BLD AUTO: 4.4 K/UL (ref 1.8–7.7)
NEUTROPHILS NFR BLD: 57.5 % (ref 38–73)
NRBC BLD-RTO: 0 /100 WBC
PLATELET # BLD AUTO: 186 K/UL (ref 150–450)
PMV BLD AUTO: 12 FL (ref 9.2–12.9)
POTASSIUM SERPL-SCNC: 4.4 MMOL/L (ref 3.5–5.1)
PROTHROMBIN TIME: 43.2 SEC (ref 9–12.5)
RBC # BLD AUTO: 4.76 M/UL (ref 4–5.4)
SODIUM SERPL-SCNC: 140 MMOL/L (ref 136–145)
WBC # BLD AUTO: 7.7 K/UL (ref 3.9–12.7)

## 2023-04-25 PROCEDURE — D9220A PRA ANESTHESIA: ICD-10-PCS | Mod: ANES,,, | Performed by: ANESTHESIOLOGY

## 2023-04-25 PROCEDURE — D9220A PRA ANESTHESIA: Mod: CRNA,,, | Performed by: NURSE ANESTHETIST, CERTIFIED REGISTERED

## 2023-04-25 PROCEDURE — C1787 PATIENT PROGR, NEUROSTIM: HCPCS | Performed by: NEUROLOGICAL SURGERY

## 2023-04-25 PROCEDURE — 71000033 HC RECOVERY, INTIAL HOUR: Performed by: NEUROLOGICAL SURGERY

## 2023-04-25 PROCEDURE — 63600175 PHARM REV CODE 636 W HCPCS: Performed by: NURSE ANESTHETIST, CERTIFIED REGISTERED

## 2023-04-25 PROCEDURE — 63600175 PHARM REV CODE 636 W HCPCS

## 2023-04-25 PROCEDURE — 85730 THROMBOPLASTIN TIME PARTIAL: CPT | Performed by: STUDENT IN AN ORGANIZED HEALTH CARE EDUCATION/TRAINING PROGRAM

## 2023-04-25 PROCEDURE — 25000003 PHARM REV CODE 250: Performed by: NEUROLOGICAL SURGERY

## 2023-04-25 PROCEDURE — 63600175 PHARM REV CODE 636 W HCPCS: Performed by: STUDENT IN AN ORGANIZED HEALTH CARE EDUCATION/TRAINING PROGRAM

## 2023-04-25 PROCEDURE — 25000003 PHARM REV CODE 250: Performed by: STUDENT IN AN ORGANIZED HEALTH CARE EDUCATION/TRAINING PROGRAM

## 2023-04-25 PROCEDURE — 27201423 OPTIME MED/SURG SUP & DEVICES STERILE SUPPLY: Performed by: NEUROLOGICAL SURGERY

## 2023-04-25 PROCEDURE — 25000003 PHARM REV CODE 250

## 2023-04-25 PROCEDURE — 37000009 HC ANESTHESIA EA ADD 15 MINS: Performed by: NEUROLOGICAL SURGERY

## 2023-04-25 PROCEDURE — 63600175 PHARM REV CODE 636 W HCPCS: Performed by: NEUROLOGICAL SURGERY

## 2023-04-25 PROCEDURE — 36000710: Performed by: NEUROLOGICAL SURGERY

## 2023-04-25 PROCEDURE — 71000016 HC POSTOP RECOV ADDL HR: Performed by: NEUROLOGICAL SURGERY

## 2023-04-25 PROCEDURE — 85025 COMPLETE CBC W/AUTO DIFF WBC: CPT | Performed by: STUDENT IN AN ORGANIZED HEALTH CARE EDUCATION/TRAINING PROGRAM

## 2023-04-25 PROCEDURE — 61886 IMPLANT NEUROSTIM ARRAYS: CPT | Mod: 58,,, | Performed by: NEUROLOGICAL SURGERY

## 2023-04-25 PROCEDURE — D9220A PRA ANESTHESIA: Mod: ANES,,, | Performed by: ANESTHESIOLOGY

## 2023-04-25 PROCEDURE — 61886 PR IMP STIM,CRANIAL,SUBQ,>1 ARRAY: ICD-10-PCS | Mod: 58,,, | Performed by: NEUROLOGICAL SURGERY

## 2023-04-25 PROCEDURE — 36000711: Performed by: NEUROLOGICAL SURGERY

## 2023-04-25 PROCEDURE — 71000015 HC POSTOP RECOV 1ST HR: Performed by: NEUROLOGICAL SURGERY

## 2023-04-25 PROCEDURE — 80048 BASIC METABOLIC PNL TOTAL CA: CPT | Performed by: STUDENT IN AN ORGANIZED HEALTH CARE EDUCATION/TRAINING PROGRAM

## 2023-04-25 PROCEDURE — 37000008 HC ANESTHESIA 1ST 15 MINUTES: Performed by: NEUROLOGICAL SURGERY

## 2023-04-25 PROCEDURE — 25000003 PHARM REV CODE 250: Performed by: NURSE ANESTHETIST, CERTIFIED REGISTERED

## 2023-04-25 PROCEDURE — 85610 PROTHROMBIN TIME: CPT | Performed by: STUDENT IN AN ORGANIZED HEALTH CARE EDUCATION/TRAINING PROGRAM

## 2023-04-25 PROCEDURE — C1883 ADAPT/EXT, PACING/NEURO LEAD: HCPCS | Performed by: NEUROLOGICAL SURGERY

## 2023-04-25 PROCEDURE — 94761 N-INVAS EAR/PLS OXIMETRY MLT: CPT

## 2023-04-25 PROCEDURE — D9220A PRA ANESTHESIA: ICD-10-PCS | Mod: CRNA,,, | Performed by: NURSE ANESTHETIST, CERTIFIED REGISTERED

## 2023-04-25 PROCEDURE — C1767 GENERATOR, NEURO NON-RECHARG: HCPCS | Performed by: NEUROLOGICAL SURGERY

## 2023-04-25 DEVICE — NEUROSTIMULATR PERCEPT 58X51MM: Type: IMPLANTABLE DEVICE | Site: CHEST | Status: FUNCTIONAL

## 2023-04-25 DEVICE — IMPLANTABLE DEVICE: Type: IMPLANTABLE DEVICE | Site: CHEST | Status: FUNCTIONAL

## 2023-04-25 RX ORDER — FENTANYL CITRATE 50 UG/ML
25 INJECTION, SOLUTION INTRAMUSCULAR; INTRAVENOUS EVERY 5 MIN PRN
Status: DISCONTINUED | OUTPATIENT
Start: 2023-04-25 | End: 2023-05-01 | Stop reason: HOSPADM

## 2023-04-25 RX ORDER — PROCHLORPERAZINE EDISYLATE 5 MG/ML
5 INJECTION INTRAMUSCULAR; INTRAVENOUS EVERY 30 MIN PRN
Status: DISCONTINUED | OUTPATIENT
Start: 2023-04-25 | End: 2023-05-01 | Stop reason: HOSPADM

## 2023-04-25 RX ORDER — HYDROCODONE BITARTRATE AND ACETAMINOPHEN 7.5; 325 MG/1; MG/1
1 TABLET ORAL EVERY 6 HOURS PRN
Qty: 28 TABLET | Refills: 0 | Status: SHIPPED | OUTPATIENT
Start: 2023-04-25 | End: 2023-05-02

## 2023-04-25 RX ORDER — FENTANYL CITRATE 50 UG/ML
INJECTION, SOLUTION INTRAMUSCULAR; INTRAVENOUS
Status: DISCONTINUED | OUTPATIENT
Start: 2023-04-25 | End: 2023-04-25

## 2023-04-25 RX ORDER — LIDOCAINE HYDROCHLORIDE 20 MG/ML
INJECTION, SOLUTION EPIDURAL; INFILTRATION; INTRACAUDAL; PERINEURAL
Status: DISCONTINUED | OUTPATIENT
Start: 2023-04-25 | End: 2023-04-25

## 2023-04-25 RX ORDER — MIDAZOLAM HYDROCHLORIDE 1 MG/ML
INJECTION INTRAMUSCULAR; INTRAVENOUS
Status: DISCONTINUED | OUTPATIENT
Start: 2023-04-25 | End: 2023-04-25

## 2023-04-25 RX ORDER — HYDROMORPHONE HYDROCHLORIDE 1 MG/ML
0.2 INJECTION, SOLUTION INTRAMUSCULAR; INTRAVENOUS; SUBCUTANEOUS EVERY 5 MIN PRN
Status: DISCONTINUED | OUTPATIENT
Start: 2023-04-25 | End: 2023-05-01 | Stop reason: HOSPADM

## 2023-04-25 RX ORDER — DEXMEDETOMIDINE HYDROCHLORIDE 100 UG/ML
INJECTION, SOLUTION INTRAVENOUS
Status: DISCONTINUED | OUTPATIENT
Start: 2023-04-25 | End: 2023-04-25

## 2023-04-25 RX ORDER — SODIUM CHLORIDE 0.9 % (FLUSH) 0.9 %
10 SYRINGE (ML) INJECTION
Status: DISCONTINUED | OUTPATIENT
Start: 2023-04-25 | End: 2023-05-01 | Stop reason: HOSPADM

## 2023-04-25 RX ORDER — ROCURONIUM BROMIDE 10 MG/ML
INJECTION, SOLUTION INTRAVENOUS
Status: DISCONTINUED | OUTPATIENT
Start: 2023-04-25 | End: 2023-04-25

## 2023-04-25 RX ORDER — LIDOCAINE HYDROCHLORIDE AND EPINEPHRINE 10; 10 MG/ML; UG/ML
INJECTION, SOLUTION INFILTRATION; PERINEURAL
Status: DISCONTINUED | OUTPATIENT
Start: 2023-04-25 | End: 2023-04-25 | Stop reason: HOSPADM

## 2023-04-25 RX ORDER — PHENYLEPHRINE HCL IN 0.9% NACL 1 MG/10 ML
SYRINGE (ML) INTRAVENOUS
Status: DISCONTINUED | OUTPATIENT
Start: 2023-04-25 | End: 2023-04-25

## 2023-04-25 RX ORDER — MUPIROCIN 20 MG/G
1 OINTMENT TOPICAL 2 TIMES DAILY
Status: DISCONTINUED | OUTPATIENT
Start: 2023-04-25 | End: 2023-04-26 | Stop reason: HOSPADM

## 2023-04-25 RX ORDER — PROPOFOL 10 MG/ML
VIAL (ML) INTRAVENOUS
Status: DISCONTINUED | OUTPATIENT
Start: 2023-04-25 | End: 2023-04-25

## 2023-04-25 RX ORDER — DEXAMETHASONE SODIUM PHOSPHATE 4 MG/ML
INJECTION, SOLUTION INTRA-ARTICULAR; INTRALESIONAL; INTRAMUSCULAR; INTRAVENOUS; SOFT TISSUE
Status: DISCONTINUED | OUTPATIENT
Start: 2023-04-25 | End: 2023-04-25

## 2023-04-25 RX ORDER — HYDROCODONE BITARTRATE AND ACETAMINOPHEN 10; 325 MG/1; MG/1
1 TABLET ORAL EVERY 6 HOURS PRN
Status: COMPLETED | OUTPATIENT
Start: 2023-04-25 | End: 2023-04-25

## 2023-04-25 RX ORDER — MUPIROCIN 20 MG/G
OINTMENT TOPICAL
Status: DISCONTINUED | OUTPATIENT
Start: 2023-04-25 | End: 2023-05-01 | Stop reason: HOSPADM

## 2023-04-25 RX ORDER — ONDANSETRON 2 MG/ML
INJECTION INTRAMUSCULAR; INTRAVENOUS
Status: DISCONTINUED | OUTPATIENT
Start: 2023-04-25 | End: 2023-04-25

## 2023-04-25 RX ADMIN — Medication 100 MCG: at 03:04

## 2023-04-25 RX ADMIN — Medication 50 MCG: at 03:04

## 2023-04-25 RX ADMIN — MUPIROCIN: 20 OINTMENT TOPICAL at 11:04

## 2023-04-25 RX ADMIN — SODIUM CHLORIDE: 0.9 INJECTION, SOLUTION INTRAVENOUS at 02:04

## 2023-04-25 RX ADMIN — LIDOCAINE HYDROCHLORIDE 80 MG: 20 INJECTION, SOLUTION EPIDURAL; INFILTRATION; INTRACAUDAL at 03:04

## 2023-04-25 RX ADMIN — DEXAMETHASONE SODIUM PHOSPHATE 4 MG: 4 INJECTION INTRA-ARTICULAR; INTRALESIONAL; INTRAMUSCULAR; INTRAVENOUS; SOFT TISSUE at 03:04

## 2023-04-25 RX ADMIN — MIDAZOLAM HYDROCHLORIDE 2 MG: 1 INJECTION INTRAMUSCULAR; INTRAVENOUS at 02:04

## 2023-04-25 RX ADMIN — HYDROMORPHONE HYDROCHLORIDE 0.2 MG: 1 INJECTION, SOLUTION INTRAMUSCULAR; INTRAVENOUS; SUBCUTANEOUS at 06:04

## 2023-04-25 RX ADMIN — ONDANSETRON 4 MG: 2 INJECTION INTRAMUSCULAR; INTRAVENOUS at 04:04

## 2023-04-25 RX ADMIN — SUGAMMADEX 200 MG: 100 INJECTION, SOLUTION INTRAVENOUS at 04:04

## 2023-04-25 RX ADMIN — SODIUM CHLORIDE, SODIUM GLUCONATE, SODIUM ACETATE, POTASSIUM CHLORIDE, MAGNESIUM CHLORIDE, SODIUM PHOSPHATE, DIBASIC, AND POTASSIUM PHOSPHATE: .53; .5; .37; .037; .03; .012; .00082 INJECTION, SOLUTION INTRAVENOUS at 03:04

## 2023-04-25 RX ADMIN — FENTANYL CITRATE 25 MCG: 50 INJECTION, SOLUTION INTRAMUSCULAR; INTRAVENOUS at 05:04

## 2023-04-25 RX ADMIN — FENTANYL CITRATE 50 MCG: 50 INJECTION, SOLUTION INTRAMUSCULAR; INTRAVENOUS at 03:04

## 2023-04-25 RX ADMIN — CEFTRIAXONE 2 G: 2 INJECTION, POWDER, FOR SOLUTION INTRAMUSCULAR; INTRAVENOUS at 07:04

## 2023-04-25 RX ADMIN — ROCURONIUM BROMIDE 10 MG: 50 INJECTION, SOLUTION INTRAVENOUS at 03:04

## 2023-04-25 RX ADMIN — DEXMEDETOMIDINE HYDROCHLORIDE 12 MCG: 100 INJECTION, SOLUTION INTRAVENOUS at 04:04

## 2023-04-25 RX ADMIN — HYDROCODONE BITARTRATE AND ACETAMINOPHEN 1 TABLET: 10; 325 TABLET ORAL at 06:04

## 2023-04-25 RX ADMIN — ROCURONIUM BROMIDE 40 MG: 50 INJECTION, SOLUTION INTRAVENOUS at 03:04

## 2023-04-25 RX ADMIN — DEXTROSE MONOHYDRATE 2 G: 5 INJECTION INTRAVENOUS at 03:04

## 2023-04-25 RX ADMIN — PROPOFOL 130 MG: 10 INJECTION, EMULSION INTRAVENOUS at 03:04

## 2023-04-25 RX ADMIN — PROPOFOL 50 MG: 10 INJECTION, EMULSION INTRAVENOUS at 03:04

## 2023-04-25 NOTE — INTERVAL H&P NOTE
The patient has been examined and the H&P has been reviewed:    I concur with the findings and no changes have occurred since H&P was written.    Surgery risks, benefits and alternative options discussed and understood by patient/family.    There are no hospital problems to display for this patient.    To OR for battery today

## 2023-04-25 NOTE — PLAN OF CARE
Patient was prepared for surgery.    Patient needs update to H&P - patient had questions for the surgery team.    Patient also needs Anesthesia consent.

## 2023-04-25 NOTE — TRANSFER OF CARE
"Anesthesia Transfer of Care Note    Patient: Anna Wagoner    Procedure(s) Performed: Procedure(s) (LRB):  INSERTION, PULSE GENERATOR, DEEP BRAIN STIMULATOR (Left)    Patient location: ICU    Anesthesia Type: general    Transport from OR: Transported from OR on 6-10 L/min O2 by face mask with adequate spontaneous ventilation    Post pain: adequate analgesia    Post assessment: no apparent anesthetic complications    Post vital signs: stable    Level of consciousness: awake    Nausea/Vomiting: no nausea/vomiting    Complications: none    Transfer of care protocol was followed      Last vitals:   Visit Vitals  /60 (BP Location: Right arm, Patient Position: Lying)   Pulse 64   Temp 36.7 °C (98 °F) (Oral)   Resp 20   Ht 5' 3" (1.6 m)   Wt 92.1 kg (203 lb)   SpO2 100%   Breastfeeding No   BMI 35.96 kg/m²     "

## 2023-04-25 NOTE — BRIEF OP NOTE
[facility] - Surgery ([facility])  Brief Operative Note    Surgery Date: 4/25/2023     Surgeon(s) and Role:     * Bonifacio Kraft MD - Primary     * Ollie Vera MD - Resident - Assisting        Pre-op Diagnosis:  Essential tremor [G25.0]    Post-op Diagnosis:  Post-Op Diagnosis Codes:     * Essential tremor [G25.0]    Procedure(s) (LRB):  INSERTION, PULSE GENERATOR, DEEP BRAIN STIMULATOR (Left)    Anesthesia: General    Operative Findings: good impedence     Estimated Blood Loss:  50cc    Specimens:   Specimen (24h ago, onward)      None              Discharge Note    OUTCOME: Patient tolerated treatment/procedure well without complication and is now ready for discharge.    DISPOSITION: Home or Self Care    FINAL DIAGNOSIS:   essential tremor    FOLLOWUP: In clinic    DISCHARGE INSTRUCTIONS:     Wound care:    Keep incisions dry. Do not soak under water (bathtub, swimming pool, etc.). Please shower with baby shampoo, but do not take a bath. If the incision becomes wet, gently pat it dry with a clean towel; do not rub.     Remove outer dressing after 48 hours. There are Steri-Strips (butterfly bandages) underneath. Keep clean and dry for 14 days (cover with saran wrap while showering). It is OK if the Steri-Strips fall off on their own before then, but please do not remove them yourself. If they are still on after 14 days, you may gently remove them in the shower. Do not place any ointment over the Steri-Strips.      Other post-op instructions:    No lifting anything heavier than a gallon of milk until cleared in post-operative visit.

## 2023-04-25 NOTE — ANESTHESIA PROCEDURE NOTES
Intubation    Date/Time: 4/25/2023 3:04 PM  Performed by: Lang Bassett MD  Authorized by: Chloe Chester MD     Intubation:     Induction:  Intravenous    Intubated:  Postinduction    Mask Ventilation:  Easy with oral airway    Attempts:  1    Attempted By:  Resident anesthesiologist    Method of Intubation:  Video laryngoscopy    Blade:  Myrick 3    Laryngeal View Grade: Grade I - full view of cords      Difficult Airway Encountered?: No      Complications:  None    Airway Device:  Oral endotracheal tube    Airway Device Size:  7.0    Style/Cuff Inflation:  Cuffed (inflated to minimal occlusive pressure)    Tube secured:  22    Secured at:  The lips    Placement Verified By:  Capnometry    Complicating Factors:  Short neck and obesity    Findings Post-Intubation:  BS equal bilateral and atraumatic/condition of teeth unchanged

## 2023-04-26 ENCOUNTER — PATIENT MESSAGE (OUTPATIENT)
Dept: NEUROSURGERY | Facility: CLINIC | Age: 68
End: 2023-04-26
Payer: MEDICARE

## 2023-04-26 NOTE — OP NOTE
Date of Operation:  4/25/2023.    Preoperative Diagnosis:  Essential Tremor.    Postoperative Diagnosis:  Essential Tremor.    Procedure:  Implantation of pulse generator for deep brain stimulation (Percept, Rocket Relief).    Surgeon:  Bonifacio Kraft MD    Assistant:  Ollie Vera MD (Resident in Neurosurgery)    Anesthesia:  General Endotracheal.    Estimated Blood Loss:  Less than 50 ml.    Condition at End of Procedure: Satisfactory.    Brief History:  This 67-year-old lady with essential tremor underwent implantation of a deep brain stimulating electrode in the left VIM thalamus last week.  She now returns for insertion of the pulse generator.      Procedure in Detail:  The patient was brought to the operating room and in the supine position on the operating table under premedication intubated and induced with general anesthesia.  Sequential compression devices were applied to the legs and various intravenous line started.  The head was turned to the right and allowed to rest on a doughnut and the bed placed in reverse Trendelenburg position to take pressure off of the jugular veins.  The hair behind her left ear was shaved and this portion of the scalp neck and left chest prepped with Betadine and draped in a sterile fashion.  A coronal incision above her left ear and a transverse incision 3 fingerbreadths below the clavicle were outlined and these were injected with 1% xylocaine and epinephrine.  Operation was begun at the scalp.  The incision was carried through the skin and galea with the plasma blade and bleeding controlled with the plasma blade.  The scalp was dissected in the subgaleal plane and the buried DBS electrode with its lead kit delivered out of the wound and covered with an antibiotic-soaked sponge.  Attention was then turned to the chest.  The skin and subcutaneous tissue were again opened with the plasma blade.  There was a fairly thick layer of subcutaneous fat.  The dissection was not carried  all the way down to the pectoralis fascia but a good plane created between the deeper subcutaneous and more superficial cutaneous fat and this carried up toward the clavicle.  The deltopectoral fascia and retromastoid fascia were opened with a hemostat.  The shunt passer was brought from the scalp down to the chest and 2 connecting leads brought up into the skin scalp wound.  The one destined to be left has a dot on it.  The future right-sided connecting lead had the plug placed into it and tightened with a torque wrench.  This was buried posteriorly in the scalp wound.  The lead kit was detached from the DBS electrode and the electrode inserted into the connecting lead with a dot and this screw tightened with a torque wrench also.  The other ends of the connecting leads were placed into a Percept dual channel pulse generator with the left side superior and the right-sided inferior.  The screws were tightened with the torque wrench also.  The pulse generator was placed into the chest wound, checked with the , and found to have normal impedance in all channels.  The generator was then affixed to the chest wall with 3-0 silk sutures.  The wounds were thoroughly irrigated.  The chest wound was closed in 2 layers, a deeper simple and more superficial inverted interrupted 3-0 Vicryl sutures and the skin closed with a 4-0 Monocryl suture half-inch Steri-Strips and Mastisol and covered with Telfa and Tegaderm.  The scalp wound was closed with inverted interrupted 3-0 Vicryl sutures in the galea and skin staples and covered with a Telfa bacitracin dressing also held in place with Tegaderm.  The patient was returned to the supine position, allowed to awaken from anesthesia, extubated, transferred to her [facility] and brought to the outpatient recovery area in satisfactory condition.  She received 2 g of Rocephin at the beginning of the procedure and Rocephin containing antibiotic irrigating solutions were used  throughout.

## 2023-04-27 NOTE — ANESTHESIA POSTPROCEDURE EVALUATION
Anesthesia Post Evaluation    Patient: Anna Wagoner    Procedure(s) Performed: Procedure(s) (LRB):  INSERTION, PULSE GENERATOR, DEEP BRAIN STIMULATOR (Left)    Final Anesthesia Type: general      Patient location during evaluation: PACU  Patient participation: Yes- Able to Participate  Level of consciousness: awake and alert  Post-procedure vital signs: reviewed and stable  Pain management: adequate  Airway patency: patent    PONV status at discharge: No PONV  Anesthetic complications: no      Cardiovascular status: hemodynamically stable  Respiratory status: unassisted, spontaneous ventilation and room air  Hydration status: euvolemic  Follow-up not needed.          Vitals Value Taken Time   /64 04/25/23 2030   Temp 36.4 °C (97.5 °F) 04/25/23 2030   Pulse 84 04/25/23 2030   Resp 26 04/25/23 1911   SpO2 95 % 04/25/23 2030   Vitals shown include unvalidated device data.      Event Time   Out of Recovery 17:00:00         Pain/Yuni Score: No data recorded

## 2023-04-27 NOTE — ANESTHESIA RELEASE NOTE
"Anesthesia Release from PACU Note    Patient: Anna Wagoner    Procedure(s) Performed: Procedure(s) (LRB):  INSERTION, PULSE GENERATOR, DEEP BRAIN STIMULATOR (Left)    Anesthesia type: general    Post pain: Adequate analgesia    Post assessment: no apparent anesthetic complications and tolerated procedure well    Last Vitals:   Visit Vitals  /64   Pulse 84   Temp 36.4 °C (97.5 °F) (Temporal)   Resp 14   Ht 5' 3" (1.6 m)   Wt 92.1 kg (203 lb)   SpO2 95%   Breastfeeding No   BMI 35.96 kg/m²       Post vital signs: stable    Level of consciousness: awake and alert     Nausea/Vomiting: no nausea/no vomiting    Complications: none    Airway Patency: patent    Respiratory: unassisted, spontaneous ventilation, room air    Cardiovascular: stable and blood pressure at baseline    Hydration: euvolemic  "

## 2023-05-04 ENCOUNTER — OFFICE VISIT (OUTPATIENT)
Dept: NEUROLOGY | Facility: CLINIC | Age: 68
End: 2023-05-04
Payer: MEDICARE

## 2023-05-04 ENCOUNTER — OFFICE VISIT (OUTPATIENT)
Dept: NEUROSURGERY | Facility: CLINIC | Age: 68
End: 2023-05-04
Payer: MEDICARE

## 2023-05-04 ENCOUNTER — TELEPHONE (OUTPATIENT)
Dept: NEUROLOGY | Facility: CLINIC | Age: 68
End: 2023-05-04
Payer: MEDICARE

## 2023-05-04 VITALS
BODY MASS INDEX: 37.39 KG/M2 | DIASTOLIC BLOOD PRESSURE: 84 MMHG | WEIGHT: 211 LBS | HEIGHT: 63 IN | HEART RATE: 62 BPM | SYSTOLIC BLOOD PRESSURE: 143 MMHG

## 2023-05-04 VITALS
BODY MASS INDEX: 37.46 KG/M2 | HEART RATE: 62 BPM | DIASTOLIC BLOOD PRESSURE: 68 MMHG | WEIGHT: 211.44 LBS | SYSTOLIC BLOOD PRESSURE: 119 MMHG | HEIGHT: 63 IN

## 2023-05-04 DIAGNOSIS — G25.0 ESSENTIAL TREMOR: Primary | ICD-10-CM

## 2023-05-04 DIAGNOSIS — G25.0 ESSENTIAL TREMOR: ICD-10-CM

## 2023-05-04 PROCEDURE — 99024 POSTOP FOLLOW-UP VISIT: CPT | Mod: POP,,, | Performed by: NEUROLOGICAL SURGERY

## 2023-05-04 PROCEDURE — 95983 ALYS BRN NPGT PRGRMG 15 MIN: CPT | Mod: S$PBB,,, | Performed by: PSYCHIATRY & NEUROLOGY

## 2023-05-04 PROCEDURE — 95984 ALYS BRN NPGT PRGRMG ADDL 15: CPT | Mod: PBBFAC | Performed by: PSYCHIATRY & NEUROLOGY

## 2023-05-04 PROCEDURE — 99215 PR OFFICE/OUTPT VISIT, EST, LEVL V, 40-54 MIN: ICD-10-PCS | Mod: 25,S$PBB,, | Performed by: PSYCHIATRY & NEUROLOGY

## 2023-05-04 PROCEDURE — 99999 PR PBB SHADOW E&M-EST. PATIENT-LVL IV: ICD-10-PCS | Mod: PBBFAC,,, | Performed by: NEUROLOGICAL SURGERY

## 2023-05-04 PROCEDURE — 99999 PR PBB SHADOW E&M-EST. PATIENT-LVL III: CPT | Mod: PBBFAC,,, | Performed by: PSYCHIATRY & NEUROLOGY

## 2023-05-04 PROCEDURE — 99024 PR POST-OP FOLLOW-UP VISIT: ICD-10-PCS | Mod: POP,,, | Performed by: NEUROLOGICAL SURGERY

## 2023-05-04 PROCEDURE — 95984 ALYS BRN NPGT PRGRMG ADDL 15: CPT | Mod: S$PBB,,, | Performed by: PSYCHIATRY & NEUROLOGY

## 2023-05-04 PROCEDURE — 99214 OFFICE O/P EST MOD 30 MIN: CPT | Mod: PBBFAC,27,25 | Performed by: NEUROLOGICAL SURGERY

## 2023-05-04 PROCEDURE — 99999 PR PBB SHADOW E&M-EST. PATIENT-LVL IV: CPT | Mod: PBBFAC,,, | Performed by: NEUROLOGICAL SURGERY

## 2023-05-04 PROCEDURE — 99999 PR PBB SHADOW E&M-EST. PATIENT-LVL III: ICD-10-PCS | Mod: PBBFAC,,, | Performed by: PSYCHIATRY & NEUROLOGY

## 2023-05-04 PROCEDURE — 99213 OFFICE O/P EST LOW 20 MIN: CPT | Mod: PBBFAC | Performed by: PSYCHIATRY & NEUROLOGY

## 2023-05-04 PROCEDURE — 95983 PR ELEC ANALYSIS, IMPLT NEURO PULSE GEN, W/PRGRM, BRAIN, 1ST 15 MINS: ICD-10-PCS | Mod: S$PBB,,, | Performed by: PSYCHIATRY & NEUROLOGY

## 2023-05-04 PROCEDURE — 99215 OFFICE O/P EST HI 40 MIN: CPT | Mod: 25,S$PBB,, | Performed by: PSYCHIATRY & NEUROLOGY

## 2023-05-04 PROCEDURE — 95984 PR ELEC ANALYSIS, IMPLT NEURO PULSE GEN, W/PRGRM, BRAIN,  EA ADDTL 15 MINS: ICD-10-PCS | Mod: S$PBB,,, | Performed by: PSYCHIATRY & NEUROLOGY

## 2023-05-04 PROCEDURE — 95983 ALYS BRN NPGT PRGRMG 15 MIN: CPT | Mod: PBBFAC | Performed by: PSYCHIATRY & NEUROLOGY

## 2023-05-04 RX ORDER — TIZANIDINE 4 MG/1
4 TABLET ORAL NIGHTLY
COMMUNITY

## 2023-05-04 NOTE — PROGRESS NOTES
Anna Wagoner was seen in neurosurgical follow-up at the office this morning.  She was admitted to [facility] and underwent implantation of a deep brain stimulating electrode in the left VIM thalamic nucleus for essential tremor on 04/18/2023.  She returned on 04/25/2023 for insertion of the pulse generator.  She did have some improvement in tremor on the right side prior to programming.  She also noted a little difficulty getting speech out but this seems to have resolved.  [facility] Dr. Shea this morning and programmed.  She seems to have good control on right-sided tremor.  She returns for staple removal.    On brief examination today she is alert and cooperative.  The scalp wounds are healing well and the staples were removed.  The chest incision is also healing well.  She has no tremor in the right upper extremity today.  She has expected continued tremor of the left hand.  Speech seems normal this morning.    We are planning a right-sided implant on 07/11/2023.  She has a dual channel pulse generator and this can be connected to the new DBS implant at that time.

## 2023-05-04 NOTE — PROGRESS NOTES
Movement Disorders - Here for DBS turn-on    Name: Anna Wagoner  MRN: [id number]   CSN: [id number]      Date: 05/04/2023    Referring physician:  No referring provider defined for this encounter.    Chief Complaint: tremor     Interval Hx    Since last visit,   Had L VIM placement  R hand shaking less- some honeymoon effect      Previous  Interval Hx  Since last visit with RR, comes for evaluation for DBS  R handed woman.  Tried the CHERI trio which did not help.  He reports a b/l hand tremor since 9-13yo slowly progressive over her lifetime L>R in the last few years. Started to have a vocal tremor and head tremor in the last years. At this point she can no longer put on makeup, cannot write. At times drops items. Struggles to type and double types.    Since several year her R hand may cramp and fingers draw up.  At times feet cramp.  Chronic neck strain. Mentions she hs spinal stenosis but no record of MRI documenting.  Takes zanaflex 8mg QHS.  Had hallucinations on propranolol    Reports had a DATscan 2021 - [place]    At times unsteady gait. Falls once a year.  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 Some word-finding and repeating asking for tasks.    Son has a tremor  Otherwise no fam hx tremor    ----------------  Prior  Interval History:  - cheri-trio 60 day trial -- did not notice any improvement with the tremor  - L hand is a little worse, some change in direction   - notices it when holding things   - drops things with her R hand   - now off of phentermine   - feels that tremors slowed down with phentermine and even more with the increased dose   - drinks one cup of coffee in the morning    - tremor improves with etoh   - chronic headaches/migraines -- interested in seeing headache specialist here         From July 2022: Anna Wagoner is a R HANDED 67 y.o. female with a medical issues significant for HTN, HLD, hypothyroidism (hashimotos), and anxiety who presents for tremors.  Referral from [facility]. Accompanied by daughter. Previously on primidone which she felt caused weight gain and constipation. No longer on it. She tried gabapentin and topamax in the past but stop due to cognitive side effects.  Propranolol caused nightmares and hallucinations?   She has had the tremors since she was at least 11 or 12 years old. Worse on the L hand now, feels that the tremor is progressing. Notices tremor whenever she is typing as well as whenever she is holding something. It does affect her handwriting. Tremor improves with EtOH. She is in a facebook ET group. She works full-time in [employer]. Plans to work for 5 more years.   Primidone helped the tremor but after a few weeks didn't find it as effective. Max dose was 100 mg BID. Felt that she was gaining weight on primidone. Does not think that this was because of thyroid issues. Per chart review, clonazepam caused daytime lethargy but she does not recall this. She asks about medications that she can take during the day.   Very sensitive to side effects of medications.   Currently taking phentermine right now for weight loss. Learning how to eat different. Years ago she tried topamax but caused cognitive side effects.    Not interested in DBS or focused ultrasound.   Asks about BPPV, sees ENT in [place].       From outside neurology provider note  67 y.o. female presents today for neurological follow up. Last seen in follow up with Dr. Desai in January 2022, when primidone was prescribed given findings most consistent with essential tremor. She does have an essential tremor (both limbs and voice) which she has had since about age 11. Tremor more noticeable in left upper extremity than right. Requests referral to movement specialist. Scheduled to see weight-loss specialist given weight gain she attributes to Primidone.   Neurocognitive status: Neurocognitive function has remained stable.   Current pertinent medications:Primidone  triggered weight gain and constipation, so she has discontinued. She tried gabapentin and topamax, but discontinued due to cognitive side effects. Klonopin prn triggered daytime lethargy. Propranolol triggered nightmares and hallucinations.         Family History: none     Neuroleptic Exposure: none        Review of Systems:   Review of Systems   Constitutional:  Negative for chills, fever and malaise/fatigue.   HENT:  Negative for hearing loss.    Eyes:  Negative for blurred vision and double vision.   Respiratory:  Negative for cough, shortness of breath and stridor.    Cardiovascular:  Negative for chest pain and leg swelling.   Gastrointestinal:  Negative for constipation, diarrhea and nausea.   Genitourinary:  Negative for frequency and urgency.   Musculoskeletal:  Negative for falls.   Skin:  Negative for itching and rash.   Neurological:  Positive for tremors. Negative for dizziness, loss of consciousness and weakness.   Psychiatric/Behavioral:  Negative for hallucinations and memory loss.          Past Medical History: The patient  has a past medical history of Anxiety, Hashimoto's thyroiditis, Hypertension, and Neck pain, chronic.    Social History: The patient  reports that she has quit smoking. Her smoking use included cigarettes. She has never been exposed to tobacco smoke. She has never used smokeless tobacco.    Family History: Their family history includes Tremor in her mother.    Allergies: Metoprolol succinate     Meds:   Current Outpatient Medications on File Prior to Visit   Medication Sig Dispense Refill    buPROPion (WELLBUTRIN XL) 300 MG 24 hr tablet Take 300 mg by mouth daily as needed.      carvediloL (COREG) 12.5 MG tablet Take 12.5 mg by mouth 2 (two) times daily.      cholecalciferol, vitamin D3, 75 mcg (3,000 unit) Tab Take 1 tablet by mouth once daily.      ergocalciferol (ERGOCALCIFEROL) 50,000 unit Cap Take 50,000 Units by mouth every 7 days.      famotidine (PEPCID) 20 MG tablet Take  "20 mg by mouth.      levothyroxine (SYNTHROID) 112 MCG tablet Take 112 mcg by mouth.      liothyronine (CYTOMEL) 5 MCG Tab Take 5 mcg by mouth.      ondansetron (ZOFRAN-ODT) 4 MG TbDL Dissolve 1 tablet (4 mg total) by mouth every 8 (eight) hours as needed (nausea). 30 tablet 0    pantoprazole (PROTONIX) 40 MG tablet Take 40 mg by mouth.      rosuvastatin (CRESTOR) 10 MG tablet Take 10 mg by mouth once daily.      sertraline (ZOLOFT) 100 MG tablet Take 100 mg by mouth once daily.      tiZANidine (ZANAFLEX) 4 MG tablet Take 4 mg by mouth nightly.      baclofen (LIORESAL) 10 MG tablet Take 1 tablet (10 mg total) by mouth every evening. 30 tablet 11     No current facility-administered medications on file prior to visit.       Exam:  /68   Pulse 62   Ht 5' 3" (1.6 m)   Wt 95.9 kg (211 lb 6.7 oz)   BMI 37.45 kg/m²     Constitutional  Well-developed, well-nourished, appears stated age   Ophthalmoscopic  No papilledema with no hemorrhages or exudates bilaterally   Cardiovascular  Radial pulses 2+ and symmetric, no LE edema bilaterally   Neurological    * Mental status  MOCA =      - Orientation  Oriented to person, place, time, and situation     - Memory   Intact recent and remote     - Attention/concentration  Attentive, vigilant during exam     - Language  Naming & repetition intact, +2-step commands     - Fund of knowledge  Aware of current events     - Executive  Well-organized thoughts     - Other     * Cranial nerves       - CN II  PERRL, visual fields full to confrontation     - CN III, IV, VI  Extraocular movements full, normal pursuits and saccades     - CN V  Sensation V1 - V3 intact     - CN VII  Face strong and symmetric bilaterally     - CN VIII  Hearing intact bilaterally     - CN IX, X  Palate raises midline and symmetric     - CN XI  SCM and trapezius 5/5 bilaterally     - CN XII  Tongue midline   * Motor  Muscle bulk normal, strength 5/5 throughout   * Sensory   Intact to temperature    * " Coordination  No dysmetria with finger-to-nose or heel-to-shin   * Gait  See below.   * Deep tendon reflexes  3+ palellae b/l  Hoffmans NEG   * Specialized movement exam  No hypophonic speech, very pleasant, appropriately animated    No facial masking.   No cogwheel rigidity     Mild wide-based gait   No shortened stride length.   No abnormal arm swing.     No postural instability.              Tremor Exam   Arms extended Arms in wing position, fingers almost touching Re-emergent Arms extended wrists extended Intention Resting Kinetic   Left ?++ ? ? ? ?+ ? ?++   Right ?+ ? ? ? ?+ ? ?+   Head tremor: No-No   Vocal Tremor on EEEs and AAAs: POS  Leg tremor: POS      Archimedes Spirals   Left ?++   Right ?+         _______________________________________  Procedure:  DBS MRI Compatibility INFO  05/04/2023      IPG Model(s) R40373   Serial No. [id number]   Impedance OK       Pocket Adapter  NO     Battery information: status ok.      LEFT  Pulse width set at 60; Frequency set at 160  left VIM Monopolar review  C&3 @3.0 no SFX  C&2 @3.0mA speech slur  C&1 @1.0 tingling R arm   @1.4 speech changed  C&0 @1mA pulling and tingling leg and arm    Activated   C+  60/160  2a - @3.0 no tingling  2b - @3.0 no tingling  2c - @3.0 light tingling    Made 3 groups - avoid deep contacts  FINAL  A omni directional  B  C            Laboratory/Radiological:  - Results:  Admission on 04/25/2023, Discharged on 04/25/2023   Component Date Value Ref Range Status    Sodium 04/25/2023 140  136 - 145 mmol/L Final    Potassium 04/25/2023 4.4  3.5 - 5.1 mmol/L Final    Chloride 04/25/2023 104  95 - 110 mmol/L Final    CO2 04/25/2023 24  23 - 29 mmol/L Final    Glucose 04/25/2023 88  70 - 110 mg/dL Final    BUN 04/25/2023 17  8 - 23 mg/dL Final    Creatinine 04/25/2023 0.8  0.5 - 1.4 mg/dL Final    Calcium 04/25/2023 10.1  8.7 - 10.5 mg/dL Final    Anion Gap 04/25/2023 12  8 - 16 mmol/L Final    eGFR 04/25/2023 >60.0  >60 mL/min/1.73 m^2 Final     WBC 04/25/2023 7.70  3.90 - 12.70 K/uL Final    RBC 04/25/2023 4.76  4.00 - 5.40 M/uL Final    Hemoglobin 04/25/2023 13.7  12.0 - 16.0 g/dL Final    Hematocrit 04/25/2023 42.2  37.0 - 48.5 % Final    MCV 04/25/2023 89  82 - 98 fL Final    MCH 04/25/2023 28.8  27.0 - 31.0 pg Final    MCHC 04/25/2023 32.5  32.0 - 36.0 g/dL Final    RDW 04/25/2023 13.7  11.5 - 14.5 % Final    Platelets 04/25/2023 186  150 - 450 K/uL Final    MPV 04/25/2023 12.0  9.2 - 12.9 fL Final    Immature Granulocytes 04/25/2023 0.4  0.0 - 0.5 % Final    Gran # (ANC) 04/25/2023 4.4  1.8 - 7.7 K/uL Final    Immature Grans (Abs) 04/25/2023 0.03  0.00 - 0.04 K/uL Final    Lymph # 04/25/2023 2.4  1.0 - 4.8 K/uL Final    Mono # 04/25/2023 0.6  0.3 - 1.0 K/uL Final    Eos # 04/25/2023 0.2  0.0 - 0.5 K/uL Final    Baso # 04/25/2023 0.05  0.00 - 0.20 K/uL Final    nRBC 04/25/2023 0  0 /100 WBC Final    Gran % 04/25/2023 57.5  38.0 - 73.0 % Final    Lymph % 04/25/2023 31.0  18.0 - 48.0 % Final    Mono % 04/25/2023 7.8  4.0 - 15.0 % Final    Eosinophil % 04/25/2023 2.7  0.0 - 8.0 % Final    Basophil % 04/25/2023 0.6  0.0 - 1.9 % Final    Differential Method 04/25/2023 Automated   Final    aPTT 04/25/2023 39.9 (H)  21.0 - 32.0 sec Final    Prothrombin Time 04/25/2023 43.2 (H)  9.0 - 12.5 sec Final    INR 04/25/2023 4.4 (H)  0.8 - 1.2 Final   Admission on 04/18/2023, Discharged on 04/19/2023   Component Date Value Ref Range Status    WBC 04/19/2023 13.43 (H)  3.90 - 12.70 K/uL Final    RBC 04/19/2023 4.28  4.00 - 5.40 M/uL Final    Hemoglobin 04/19/2023 12.2  12.0 - 16.0 g/dL Final    Hematocrit 04/19/2023 38.0  37.0 - 48.5 % Final    MCV 04/19/2023 89  82 - 98 fL Final    MCH 04/19/2023 28.5  27.0 - 31.0 pg Final    MCHC 04/19/2023 32.1  32.0 - 36.0 g/dL Final    RDW 04/19/2023 14.0  11.5 - 14.5 % Final    Platelets 04/19/2023 255  150 - 450 K/uL Final    MPV 04/19/2023 11.3  9.2 - 12.9 fL Final    Immature Granulocytes 04/19/2023 0.3  0.0 - 0.5 % Final     Gran # (ANC) 04/19/2023 10.1 (H)  1.8 - 7.7 K/uL Final    Immature Grans (Abs) 04/19/2023 0.04  0.00 - 0.04 K/uL Final    Lymph # 04/19/2023 2.2  1.0 - 4.8 K/uL Final    Mono # 04/19/2023 1.1 (H)  0.3 - 1.0 K/uL Final    Eos # 04/19/2023 0.0  0.0 - 0.5 K/uL Final    Baso # 04/19/2023 0.04  0.00 - 0.20 K/uL Final    nRBC 04/19/2023 0  0 /100 WBC Final    Gran % 04/19/2023 75.3 (H)  38.0 - 73.0 % Final    Lymph % 04/19/2023 16.2 (L)  18.0 - 48.0 % Final    Mono % 04/19/2023 7.8  4.0 - 15.0 % Final    Eosinophil % 04/19/2023 0.1  0.0 - 8.0 % Final    Basophil % 04/19/2023 0.3  0.0 - 1.9 % Final    Differential Method 04/19/2023 Automated   Final    Sodium 04/19/2023 141  136 - 145 mmol/L Final    Potassium 04/19/2023 3.9  3.5 - 5.1 mmol/L Final    Chloride 04/19/2023 108  95 - 110 mmol/L Final    CO2 04/19/2023 25  23 - 29 mmol/L Final    Glucose 04/19/2023 115 (H)  70 - 110 mg/dL Final    BUN 04/19/2023 9  8 - 23 mg/dL Final    Creatinine 04/19/2023 0.8  0.5 - 1.4 mg/dL Final    Calcium 04/19/2023 8.9  8.7 - 10.5 mg/dL Final    Anion Gap 04/19/2023 8  8 - 16 mmol/L Final    eGFR 04/19/2023 >60.0  >60 mL/min/1.73 m^2 Final    Magnesium 04/19/2023 1.9  1.6 - 2.6 mg/dL Final    Phosphorus 04/19/2023 3.5  2.7 - 4.5 mg/dL Final         Problem List Items Addressed This Visit          Neuro    Essential tremor    Current Assessment & Plan     Long[place] ET-like tremor  At times dystonic pulling R hand and possibly neck typical to ET  ETOH responsive    1st time DBS programming  Avoiding deep contacts  Set 3 settings  A-omni  B-directional 1  C-directional 2    She knows to turn DBS OFF at [facility] and ON in the morning                I spent 60 minutes programming DBS      Zainab Shea MD, MS  [facility]  Department of Neurology  Movement Disorders

## 2023-05-04 NOTE — TELEPHONE ENCOUNTER
----- Message from Chana Portillo sent at 5/4/2023  8:02 AM CDT -----  Regarding: apppt access  Contact: [phone]  Pt running 10 min late for appt 5/4/23. Shikha lyles

## 2023-05-04 NOTE — TELEPHONE ENCOUNTER
Called pt back to let them know that they should be okay running quite a bit late since we had a cancellation at 10 AM. Pt said they are about 16 minutes away and will be there soon

## 2023-05-04 NOTE — ASSESSMENT & PLAN NOTE
Long[place] ET-like tremor  At times dystonic pulling R hand and possibly neck typical to ET  ETOH responsive    1st time DBS programming  Avoiding deep contacts  Set 3 settings  A-omni  B-directional 1  C-directional 2    She knows to turn DBS OFF at nigh and ON in the morning

## 2023-05-15 ENCOUNTER — PATIENT MESSAGE (OUTPATIENT)
Dept: NEUROSURGERY | Facility: CLINIC | Age: 68
End: 2023-05-15
Payer: MEDICARE

## 2023-05-17 ENCOUNTER — PATIENT MESSAGE (OUTPATIENT)
Dept: NEUROSURGERY | Facility: CLINIC | Age: 68
End: 2023-05-17
Payer: MEDICARE

## 2023-06-05 ENCOUNTER — TELEPHONE (OUTPATIENT)
Dept: NEUROSURGERY | Facility: CLINIC | Age: 68
End: 2023-06-05
Payer: MEDICARE

## 2023-06-05 ENCOUNTER — PATIENT MESSAGE (OUTPATIENT)
Dept: NEUROLOGY | Facility: CLINIC | Age: 68
End: 2023-06-05
Payer: MEDICARE

## 2023-06-05 ENCOUNTER — PATIENT MESSAGE (OUTPATIENT)
Dept: NEUROSURGERY | Facility: CLINIC | Age: 68
End: 2023-06-05
Payer: MEDICARE

## 2023-06-05 DIAGNOSIS — Z01.812 PRE-OPERATIVE LABORATORY EXAMINATION: ICD-10-CM

## 2023-06-05 DIAGNOSIS — Z01.818 PRE-OP TESTING: ICD-10-CM

## 2023-06-05 DIAGNOSIS — G25.0 ESSENTIAL TREMOR: Primary | ICD-10-CM

## 2023-06-05 DIAGNOSIS — R60.9 EDEMA, UNSPECIFIED TYPE: ICD-10-CM

## 2023-06-05 DIAGNOSIS — M79.89 OTHER SPECIFIED SOFT TISSUE DISORDERS: ICD-10-CM

## 2023-06-26 ENCOUNTER — HOSPITAL ENCOUNTER (OUTPATIENT)
Dept: RADIOLOGY | Facility: HOSPITAL | Age: 68
Discharge: HOME | End: 2023-06-26
Attending: NEUROLOGICAL SURGERY
Payer: MEDICARE

## 2023-06-26 ENCOUNTER — DOCUMENTATION ONLY (OUTPATIENT)
Dept: NEUROLOGY | Facility: CLINIC | Age: 68
End: 2023-06-26
Payer: MEDICARE

## 2023-06-26 ENCOUNTER — OFFICE VISIT (OUTPATIENT)
Dept: NEUROSURGERY | Facility: CLINIC | Age: 68
End: 2023-06-26
Payer: MEDICARE

## 2023-06-26 VITALS
SYSTOLIC BLOOD PRESSURE: 154 MMHG | HEART RATE: 71 BPM | HEIGHT: 63 IN | BODY MASS INDEX: 37.39 KG/M2 | DIASTOLIC BLOOD PRESSURE: 81 MMHG | WEIGHT: 211 LBS

## 2023-06-26 DIAGNOSIS — G25.0 ESSENTIAL TREMOR: Primary | ICD-10-CM

## 2023-06-26 DIAGNOSIS — G25.0 ESSENTIAL TREMOR: ICD-10-CM

## 2023-06-26 PROCEDURE — 99024 POSTOP FOLLOW-UP VISIT: CPT | Mod: S$PBB,,, | Performed by: NEUROLOGICAL SURGERY

## 2023-06-26 PROCEDURE — 99214 OFFICE O/P EST MOD 30 MIN: CPT | Mod: PBBFAC,25 | Performed by: NEUROLOGICAL SURGERY

## 2023-06-26 PROCEDURE — A9585 GADOBUTROL INJECTION: HCPCS | Performed by: NEUROLOGICAL SURGERY

## 2023-06-26 PROCEDURE — 25500020 PHARM REV CODE 255: Performed by: NEUROLOGICAL SURGERY

## 2023-06-26 PROCEDURE — 70552 MRI BRAIN STEM W/DYE: CPT | Mod: 26,,, | Performed by: RADIOLOGY

## 2023-06-26 PROCEDURE — 99999 PR PBB SHADOW E&M-EST. PATIENT-LVL IV: CPT | Mod: PBBFAC,,, | Performed by: NEUROLOGICAL SURGERY

## 2023-06-26 PROCEDURE — 99999 PR PBB SHADOW E&M-EST. PATIENT-LVL IV: ICD-10-PCS | Mod: PBBFAC,,, | Performed by: NEUROLOGICAL SURGERY

## 2023-06-26 PROCEDURE — 70552 MRI BRAIN STEM W/DYE: CPT | Mod: TC

## 2023-06-26 PROCEDURE — 70552 MRI BRAIN STEALTH W/O FIDUCIALS WITH CONTRAST: ICD-10-PCS | Mod: 26,,, | Performed by: RADIOLOGY

## 2023-06-26 PROCEDURE — 99024 PR POST-OP FOLLOW-UP VISIT: ICD-10-PCS | Mod: S$PBB,,, | Performed by: NEUROLOGICAL SURGERY

## 2023-06-26 RX ORDER — BUTALBITAL, ACETAMINOPHEN AND CAFFEINE 300; 40; 50 MG/1; MG/1; MG/1
CAPSULE ORAL
COMMUNITY
Start: 2010-02-07

## 2023-06-26 RX ORDER — GADOBUTROL 604.72 MG/ML
10 INJECTION INTRAVENOUS
Status: COMPLETED | OUTPATIENT
Start: 2023-06-26 | End: 2023-06-26

## 2023-06-26 RX ORDER — IBUPROFEN 800 MG/1
800 TABLET ORAL 2 TIMES DAILY PRN
COMMUNITY
Start: 2023-05-13

## 2023-06-26 RX ADMIN — GADOBUTROL 10 ML: 604.72 INJECTION INTRAVENOUS at 03:06

## 2023-06-26 NOTE — H&P (VIEW-ONLY)
Anna Wagoner (Leany) was seen in neurosurgical follow-up at the office today.  She is a 68-year-old lady with a long history of essential tremor who underwent implantation of a deep brain stimulating electrode in the left VIM thalamic nucleus on 04/18 and had the pulse generator placed on 04/25/2023.  This has worked well for her.  She is now ready for implantation of a DBS electrode on the right side.  She still has a little fine tremor on the right hand and says that intermittently she has some head and neck tremor.  She feels her gait and balance are generally good but maybe occasionally off.  She did fall trying to avoid sitting on her small dog on a sofa.  She occasionally gets a sharp pain in the scalp on the left. It would this seems to be in the area of her buried DBS electrode.    On brief examination she is bright and alert.  Incision seems nicely healed it is nontender today.  She showed minimal tremor of the right hand.  Moderate tremor on the left side.  She stood and walked without much difficulty today and seems otherwise neurologically intact.    Implantation of the right-sided DBS electrode is planned for 07/11/2023.  We will connect this to the pre-existing connecting lead on the left side of her head.  Experience some occipital and cervical pain while on the operating table.  We have looked into a different headrest but will use extra foam if this is not available.

## 2023-07-04 ENCOUNTER — PATIENT MESSAGE (OUTPATIENT)
Dept: SURGERY | Facility: HOSPITAL | Age: 68
End: 2023-07-04
Payer: MEDICARE

## 2023-07-10 ENCOUNTER — ANESTHESIA EVENT (OUTPATIENT)
Dept: SURGERY | Facility: HOSPITAL | Age: 68
DRG: 027 | End: 2023-07-10
Payer: MEDICARE

## 2023-07-11 ENCOUNTER — ANESTHESIA (OUTPATIENT)
Dept: SURGERY | Facility: HOSPITAL | Age: 68
DRG: 027 | End: 2023-07-11
Payer: MEDICARE

## 2023-07-11 ENCOUNTER — HOSPITAL ENCOUNTER (INPATIENT)
Facility: HOSPITAL | Age: 68
LOS: 1 days | Discharge: HOME | DRG: 027 | End: 2023-07-12
Attending: NEUROLOGICAL SURGERY | Admitting: NEUROLOGICAL SURGERY
Payer: MEDICARE

## 2023-07-11 DIAGNOSIS — G20.A1 PD (PARKINSON'S DISEASE): Primary | ICD-10-CM

## 2023-07-11 LAB
ABO + RH BLD: NORMAL
APTT PPP: 28.2 SEC (ref 21–32)
BASOPHILS # BLD AUTO: 0.06 K/UL (ref 0–0.2)
BASOPHILS NFR BLD: 0.8 % (ref 0–1.9)
BLD GP AB SCN CELLS X3 SERPL QL: NORMAL
DIFFERENTIAL METHOD: NORMAL
EOSINOPHIL # BLD AUTO: 0.3 K/UL (ref 0–0.5)
EOSINOPHIL NFR BLD: 3.7 % (ref 0–8)
ERYTHROCYTE [DISTWIDTH] IN BLOOD BY AUTOMATED COUNT: 13.2 % (ref 11.5–14.5)
HCT VFR BLD AUTO: 41.5 % (ref 37–48.5)
HGB BLD-MCNC: 13.6 G/DL (ref 12–16)
IMM GRANULOCYTES # BLD AUTO: 0.02 K/UL (ref 0–0.04)
IMM GRANULOCYTES NFR BLD AUTO: 0.3 % (ref 0–0.5)
INR PPP: 1 (ref 0.8–1.2)
LYMPHOCYTES # BLD AUTO: 2.6 K/UL (ref 1–4.8)
LYMPHOCYTES NFR BLD: 32.4 % (ref 18–48)
MCH RBC QN AUTO: 28.8 PG (ref 27–31)
MCHC RBC AUTO-ENTMCNC: 32.8 G/DL (ref 32–36)
MCV RBC AUTO: 88 FL (ref 82–98)
MONOCYTES # BLD AUTO: 0.8 K/UL (ref 0.3–1)
MONOCYTES NFR BLD: 9.6 % (ref 4–15)
NEUTROPHILS # BLD AUTO: 4.2 K/UL (ref 1.8–7.7)
NEUTROPHILS NFR BLD: 53.2 % (ref 38–73)
NRBC BLD-RTO: 0 /100 WBC
PLATELET # BLD AUTO: 224 K/UL (ref 150–450)
PMV BLD AUTO: 11.7 FL (ref 9.2–12.9)
PROTHROMBIN TIME: 11.1 SEC (ref 9–12.5)
RBC # BLD AUTO: 4.72 M/UL (ref 4–5.4)
SPECIMEN OUTDATE: NORMAL
WBC # BLD AUTO: 7.92 K/UL (ref 3.9–12.7)

## 2023-07-11 PROCEDURE — 61867 PR IMPLANT NEUROELECTRODE W/ RECORDING: ICD-10-PCS | Mod: 58,RT,GC, | Performed by: NEUROLOGICAL SURGERY

## 2023-07-11 PROCEDURE — 63600175 PHARM REV CODE 636 W HCPCS: Performed by: STUDENT IN AN ORGANIZED HEALTH CARE EDUCATION/TRAINING PROGRAM

## 2023-07-11 PROCEDURE — 27201423 OPTIME MED/SURG SUP & DEVICES STERILE SUPPLY: Performed by: NEUROLOGICAL SURGERY

## 2023-07-11 PROCEDURE — 94761 N-INVAS EAR/PLS OXIMETRY MLT: CPT

## 2023-07-11 PROCEDURE — 36415 COLL VENOUS BLD VENIPUNCTURE: CPT | Performed by: NEUROLOGICAL SURGERY

## 2023-07-11 PROCEDURE — 27201037 HC PRESSURE MONITORING SET UP

## 2023-07-11 PROCEDURE — 85610 PROTHROMBIN TIME: CPT | Performed by: STUDENT IN AN ORGANIZED HEALTH CARE EDUCATION/TRAINING PROGRAM

## 2023-07-11 PROCEDURE — C1778 LEAD, NEUROSTIMULATOR: HCPCS | Performed by: NEUROLOGICAL SURGERY

## 2023-07-11 PROCEDURE — 95962 PR FUNC CORTICAL MAP,BRAIN,EA ADDN HR: ICD-10-PCS | Mod: 26,,, | Performed by: PSYCHIATRY & NEUROLOGY

## 2023-07-11 PROCEDURE — 71000016 HC POSTOP RECOV ADDL HR: Performed by: NEUROLOGICAL SURGERY

## 2023-07-11 PROCEDURE — 71000033 HC RECOVERY, INTIAL HOUR: Performed by: NEUROLOGICAL SURGERY

## 2023-07-11 PROCEDURE — 11000001 HC ACUTE MED/SURG PRIVATE ROOM

## 2023-07-11 PROCEDURE — 85025 COMPLETE CBC W/AUTO DIFF WBC: CPT | Performed by: STUDENT IN AN ORGANIZED HEALTH CARE EDUCATION/TRAINING PROGRAM

## 2023-07-11 PROCEDURE — D9220A PRA ANESTHESIA: Mod: ANES,,, | Performed by: ANESTHESIOLOGY

## 2023-07-11 PROCEDURE — 63600175 PHARM REV CODE 636 W HCPCS

## 2023-07-11 PROCEDURE — 25000003 PHARM REV CODE 250: Performed by: NEUROLOGICAL SURGERY

## 2023-07-11 PROCEDURE — 63600175 PHARM REV CODE 636 W HCPCS: Performed by: NEUROLOGICAL SURGERY

## 2023-07-11 PROCEDURE — 95961 ELECTRODE STIMULATION BRAIN: CPT | Mod: 26,,, | Performed by: PSYCHIATRY & NEUROLOGY

## 2023-07-11 PROCEDURE — D9220A PRA ANESTHESIA: ICD-10-PCS | Mod: ANES,,, | Performed by: ANESTHESIOLOGY

## 2023-07-11 PROCEDURE — 61867 IMPLANT NEUROELECTRODE: CPT | Mod: 58,RT,GC, | Performed by: NEUROLOGICAL SURGERY

## 2023-07-11 PROCEDURE — 25000003 PHARM REV CODE 250: Performed by: STUDENT IN AN ORGANIZED HEALTH CARE EDUCATION/TRAINING PROGRAM

## 2023-07-11 PROCEDURE — 36000711: Performed by: NEUROLOGICAL SURGERY

## 2023-07-11 PROCEDURE — 95961 PR ELECTRODE STIM,BRAIN,MD 1ST HR: ICD-10-PCS | Mod: 26,,, | Performed by: PSYCHIATRY & NEUROLOGY

## 2023-07-11 PROCEDURE — 86900 BLOOD TYPING SEROLOGIC ABO: CPT | Performed by: STUDENT IN AN ORGANIZED HEALTH CARE EDUCATION/TRAINING PROGRAM

## 2023-07-11 PROCEDURE — 37000009 HC ANESTHESIA EA ADD 15 MINS: Performed by: NEUROLOGICAL SURGERY

## 2023-07-11 PROCEDURE — 37000008 HC ANESTHESIA 1ST 15 MINUTES: Performed by: NEUROLOGICAL SURGERY

## 2023-07-11 PROCEDURE — 25000003 PHARM REV CODE 250: Performed by: NURSE ANESTHETIST, CERTIFIED REGISTERED

## 2023-07-11 PROCEDURE — 95962 ELECTRODE STIM BRAIN ADD-ON: CPT | Mod: 26,,, | Performed by: PSYCHIATRY & NEUROLOGY

## 2023-07-11 PROCEDURE — 36000710: Performed by: NEUROLOGICAL SURGERY

## 2023-07-11 PROCEDURE — 71000015 HC POSTOP RECOV 1ST HR: Performed by: NEUROLOGICAL SURGERY

## 2023-07-11 PROCEDURE — 63600175 PHARM REV CODE 636 W HCPCS: Performed by: NURSE ANESTHETIST, CERTIFIED REGISTERED

## 2023-07-11 PROCEDURE — A4648 IMPLANTABLE TISSUE MARKER: HCPCS | Performed by: NEUROLOGICAL SURGERY

## 2023-07-11 PROCEDURE — 63600175 PHARM REV CODE 636 W HCPCS: Performed by: ANESTHESIOLOGY

## 2023-07-11 PROCEDURE — D9220A PRA ANESTHESIA: ICD-10-PCS | Mod: CRNA,,, | Performed by: NURSE ANESTHETIST, CERTIFIED REGISTERED

## 2023-07-11 PROCEDURE — D9220A PRA ANESTHESIA: Mod: CRNA,,, | Performed by: NURSE ANESTHETIST, CERTIFIED REGISTERED

## 2023-07-11 PROCEDURE — 85730 THROMBOPLASTIN TIME PARTIAL: CPT | Performed by: STUDENT IN AN ORGANIZED HEALTH CARE EDUCATION/TRAINING PROGRAM

## 2023-07-11 DEVICE — IMPLANTABLE DEVICE: Type: IMPLANTABLE DEVICE | Site: CRANIAL | Status: FUNCTIONAL

## 2023-07-11 RX ORDER — MUPIROCIN 20 MG/G
1 OINTMENT TOPICAL 2 TIMES DAILY
Status: DISCONTINUED | OUTPATIENT
Start: 2023-07-11 | End: 2023-07-11 | Stop reason: HOSPADM

## 2023-07-11 RX ORDER — SODIUM CHLORIDE 9 MG/ML
INJECTION, SOLUTION INTRAVENOUS CONTINUOUS
Status: DISCONTINUED | OUTPATIENT
Start: 2023-07-11 | End: 2023-07-12 | Stop reason: HOSPADM

## 2023-07-11 RX ORDER — HYDROMORPHONE HYDROCHLORIDE 1 MG/ML
INJECTION, SOLUTION INTRAMUSCULAR; INTRAVENOUS; SUBCUTANEOUS
Status: COMPLETED
Start: 2023-07-11 | End: 2023-07-11

## 2023-07-11 RX ORDER — UBIDECARENONE 30 MG
30 CAPSULE ORAL DAILY
COMMUNITY

## 2023-07-11 RX ORDER — NICARDIPINE HYDROCHLORIDE 0.2 MG/ML
INJECTION INTRAVENOUS CONTINUOUS PRN
Status: DISCONTINUED | OUTPATIENT
Start: 2023-07-11 | End: 2023-07-11

## 2023-07-11 RX ORDER — SODIUM CHLORIDE 0.9 % (FLUSH) 0.9 %
3 SYRINGE (ML) INJECTION
Status: DISCONTINUED | OUTPATIENT
Start: 2023-07-11 | End: 2023-07-11 | Stop reason: HOSPADM

## 2023-07-11 RX ORDER — OXYCODONE HYDROCHLORIDE 10 MG/1
10 TABLET ORAL EVERY 6 HOURS PRN
Status: DISCONTINUED | OUTPATIENT
Start: 2023-07-11 | End: 2023-07-12 | Stop reason: HOSPADM

## 2023-07-11 RX ORDER — ACETAMINOPHEN 500 MG
1000 TABLET ORAL EVERY 8 HOURS
Status: DISCONTINUED | OUTPATIENT
Start: 2023-07-11 | End: 2023-07-12 | Stop reason: HOSPADM

## 2023-07-11 RX ORDER — HYDROMORPHONE HYDROCHLORIDE 1 MG/ML
0.2 INJECTION, SOLUTION INTRAMUSCULAR; INTRAVENOUS; SUBCUTANEOUS EVERY 5 MIN PRN
Status: DISCONTINUED | OUTPATIENT
Start: 2023-07-11 | End: 2023-07-11 | Stop reason: HOSPADM

## 2023-07-11 RX ORDER — CARVEDILOL 12.5 MG/1
12.5 TABLET ORAL 2 TIMES DAILY
Status: DISCONTINUED | OUTPATIENT
Start: 2023-07-11 | End: 2023-07-12 | Stop reason: HOSPADM

## 2023-07-11 RX ORDER — LIDOCAINE HYDROCHLORIDE 20 MG/ML
JELLY TOPICAL
Status: DISCONTINUED | OUTPATIENT
Start: 2023-07-11 | End: 2023-07-11 | Stop reason: HOSPADM

## 2023-07-11 RX ORDER — ONDANSETRON 2 MG/ML
INJECTION INTRAMUSCULAR; INTRAVENOUS
Status: DISPENSED
Start: 2023-07-11 | End: 2023-07-12

## 2023-07-11 RX ORDER — FAMOTIDINE 20 MG/1
20 TABLET, FILM COATED ORAL NIGHTLY
Status: DISCONTINUED | OUTPATIENT
Start: 2023-07-11 | End: 2023-07-12 | Stop reason: HOSPADM

## 2023-07-11 RX ORDER — TIZANIDINE 4 MG/1
4 TABLET ORAL NIGHTLY
Status: DISCONTINUED | OUTPATIENT
Start: 2023-07-11 | End: 2023-07-12 | Stop reason: HOSPADM

## 2023-07-11 RX ORDER — LIDOCAINE HYDROCHLORIDE 20 MG/ML
INJECTION INTRAVENOUS
Status: DISCONTINUED | OUTPATIENT
Start: 2023-07-11 | End: 2023-07-11

## 2023-07-11 RX ORDER — PROPOFOL 10 MG/ML
VIAL (ML) INTRAVENOUS CONTINUOUS PRN
Status: DISCONTINUED | OUTPATIENT
Start: 2023-07-11 | End: 2023-07-11

## 2023-07-11 RX ORDER — ONDANSETRON 4 MG/1
4 TABLET, ORALLY DISINTEGRATING ORAL EVERY 8 HOURS PRN
Status: DISCONTINUED | OUTPATIENT
Start: 2023-07-11 | End: 2023-07-12 | Stop reason: HOSPADM

## 2023-07-11 RX ORDER — ONDANSETRON 2 MG/ML
INJECTION INTRAMUSCULAR; INTRAVENOUS
Status: DISCONTINUED | OUTPATIENT
Start: 2023-07-11 | End: 2023-07-11

## 2023-07-11 RX ORDER — CEFTRIAXONE 1 G/1
INJECTION, POWDER, FOR SOLUTION INTRAMUSCULAR; INTRAVENOUS
Status: DISCONTINUED | OUTPATIENT
Start: 2023-07-11 | End: 2023-07-11 | Stop reason: HOSPADM

## 2023-07-11 RX ORDER — MUPIROCIN 20 MG/G
OINTMENT TOPICAL
Status: DISCONTINUED | OUTPATIENT
Start: 2023-07-11 | End: 2023-07-11 | Stop reason: HOSPADM

## 2023-07-11 RX ORDER — PROPOFOL 10 MG/ML
VIAL (ML) INTRAVENOUS
Status: DISCONTINUED | OUTPATIENT
Start: 2023-07-11 | End: 2023-07-11

## 2023-07-11 RX ORDER — LIDOCAINE HYDROCHLORIDE AND EPINEPHRINE 10; 10 MG/ML; UG/ML
INJECTION, SOLUTION INFILTRATION; PERINEURAL
Status: DISCONTINUED | OUTPATIENT
Start: 2023-07-11 | End: 2023-07-11 | Stop reason: HOSPADM

## 2023-07-11 RX ORDER — LEVOTHYROXINE SODIUM 112 UG/1
112 TABLET ORAL
Status: DISCONTINUED | OUTPATIENT
Start: 2023-07-12 | End: 2023-07-12 | Stop reason: HOSPADM

## 2023-07-11 RX ORDER — SERTRALINE HYDROCHLORIDE 100 MG/1
100 TABLET, FILM COATED ORAL NIGHTLY
Status: DISCONTINUED | OUTPATIENT
Start: 2023-07-11 | End: 2023-07-12 | Stop reason: HOSPADM

## 2023-07-11 RX ORDER — OXYCODONE HYDROCHLORIDE 5 MG/1
5 TABLET ORAL EVERY 4 HOURS PRN
Status: DISCONTINUED | OUTPATIENT
Start: 2023-07-11 | End: 2023-07-12 | Stop reason: HOSPADM

## 2023-07-11 RX ORDER — DEXAMETHASONE SODIUM PHOSPHATE 4 MG/ML
INJECTION, SOLUTION INTRA-ARTICULAR; INTRALESIONAL; INTRAMUSCULAR; INTRAVENOUS; SOFT TISSUE
Status: DISCONTINUED | OUTPATIENT
Start: 2023-07-11 | End: 2023-07-11

## 2023-07-11 RX ORDER — ONDANSETRON 2 MG/ML
4 INJECTION INTRAMUSCULAR; INTRAVENOUS ONCE AS NEEDED
Status: COMPLETED | OUTPATIENT
Start: 2023-07-11 | End: 2023-07-11

## 2023-07-11 RX ORDER — LORAZEPAM 2 MG/ML
0.25 INJECTION INTRAMUSCULAR ONCE AS NEEDED
Status: DISCONTINUED | OUTPATIENT
Start: 2023-07-11 | End: 2023-07-11 | Stop reason: HOSPADM

## 2023-07-11 RX ADMIN — SODIUM CHLORIDE 0.05 MCG/KG/MIN: 9 INJECTION, SOLUTION INTRAVENOUS at 07:07

## 2023-07-11 RX ADMIN — NICARDIPINE HYDROCHLORIDE 5 MG/HR: 0.2 INJECTION, SOLUTION INTRAVENOUS at 10:07

## 2023-07-11 RX ADMIN — TIZANIDINE 4 MG: 4 TABLET ORAL at 09:07

## 2023-07-11 RX ADMIN — OXYCODONE HYDROCHLORIDE 10 MG: 10 TABLET ORAL at 06:07

## 2023-07-11 RX ADMIN — PROPOFOL 40 MG: 10 INJECTION, EMULSION INTRAVENOUS at 07:07

## 2023-07-11 RX ADMIN — HYDROMORPHONE HYDROCHLORIDE 0.2 MG: 1 INJECTION, SOLUTION INTRAMUSCULAR; INTRAVENOUS; SUBCUTANEOUS at 04:07

## 2023-07-11 RX ADMIN — HYDROMORPHONE HYDROCHLORIDE 0.2 MG: 1 INJECTION, SOLUTION INTRAMUSCULAR; INTRAVENOUS; SUBCUTANEOUS at 03:07

## 2023-07-11 RX ADMIN — HYDROMORPHONE HYDROCHLORIDE 0.2 MG: 1 INJECTION, SOLUTION INTRAMUSCULAR; INTRAVENOUS; SUBCUTANEOUS at 12:07

## 2023-07-11 RX ADMIN — PROPOFOL 30 MG: 10 INJECTION, EMULSION INTRAVENOUS at 07:07

## 2023-07-11 RX ADMIN — CEFTRIAXONE 2 G: 1 INJECTION, POWDER, FOR SOLUTION INTRAMUSCULAR; INTRAVENOUS at 07:07

## 2023-07-11 RX ADMIN — FAMOTIDINE 20 MG: 20 TABLET, FILM COATED ORAL at 09:07

## 2023-07-11 RX ADMIN — MUPIROCIN: 20 OINTMENT TOPICAL at 06:07

## 2023-07-11 RX ADMIN — PROPOFOL 60 MG: 10 INJECTION, EMULSION INTRAVENOUS at 07:07

## 2023-07-11 RX ADMIN — ACETAMINOPHEN 1000 MG: 500 TABLET ORAL at 09:07

## 2023-07-11 RX ADMIN — CARVEDILOL 12.5 MG: 12.5 TABLET, FILM COATED ORAL at 09:07

## 2023-07-11 RX ADMIN — SODIUM CHLORIDE: 0.9 INJECTION, SOLUTION INTRAVENOUS at 10:07

## 2023-07-11 RX ADMIN — DEXAMETHASONE SODIUM PHOSPHATE 4 MG: 4 INJECTION, SOLUTION INTRAMUSCULAR; INTRAVENOUS at 10:07

## 2023-07-11 RX ADMIN — LIDOCAINE HYDROCHLORIDE 40 MG: 20 INJECTION INTRAVENOUS at 07:07

## 2023-07-11 RX ADMIN — ONDANSETRON 4 MG: 2 INJECTION INTRAMUSCULAR; INTRAVENOUS at 10:07

## 2023-07-11 RX ADMIN — ACETAMINOPHEN 1000 MG: 500 TABLET ORAL at 01:07

## 2023-07-11 RX ADMIN — SERTRALINE 100 MG: 100 TABLET, FILM COATED ORAL at 09:07

## 2023-07-11 RX ADMIN — ONDANSETRON 4 MG: 4 TABLET, ORALLY DISINTEGRATING ORAL at 06:07

## 2023-07-11 RX ADMIN — PROPOFOL 20 MG: 10 INJECTION, EMULSION INTRAVENOUS at 07:07

## 2023-07-11 RX ADMIN — Medication 50 MCG/KG/MIN: at 07:07

## 2023-07-11 RX ADMIN — CEFTRIAXONE 2 G: 2 INJECTION, POWDER, FOR SOLUTION INTRAMUSCULAR; INTRAVENOUS at 06:07

## 2023-07-11 RX ADMIN — SODIUM CHLORIDE: 0.9 INJECTION, SOLUTION INTRAVENOUS at 07:07

## 2023-07-11 RX ADMIN — OXYCODONE HYDROCHLORIDE 10 MG: 10 TABLET ORAL at 12:07

## 2023-07-11 RX ADMIN — ONDANSETRON 4 MG: 2 INJECTION INTRAMUSCULAR; INTRAVENOUS at 12:07

## 2023-07-11 NOTE — PLAN OF CARE
Chart reviewed. Preop nursing care completed per orders. Safe surgery checklist complete. Family at bedside and to take belongings. Waiting for full H&P and anesthesia consent prior to surgery. Call bell within reach. Instructed pt to call for assistance.

## 2023-07-11 NOTE — NURSING TRANSFER
Nursing Transfer Note      7/11/2023     Reason patient is being transferred: post procedure    Transfer To: ct scan then to room 954    Transfer via stretcher    Transfer with 2L NC to O2    Transported by transport    Medicines sent: yes    Any special needs or follow-up needed: routine    Chart send with patient: Yes    Notified: daughter    Patient reassessed at: 1624 7/11/2023

## 2023-07-11 NOTE — TRANSFER OF CARE
"Anesthesia Transfer of Care Note    Patient: Anna Wagoner    Procedure(s) Performed: Procedure(s) (LRB):  INSERTION, FIDUCIAL SCREW, SKULL/DBS (N/A)  INSERTION, DEEP BRAIN STIMULATOR (Right)    Patient location: PACU    Anesthesia Type: general    Transport from OR: Transported from OR on 6-10 L/min O2 by face mask with adequate spontaneous ventilation    Post pain: adequate analgesia    Post assessment: no apparent anesthetic complications    Post vital signs: stable    Level of consciousness: awake, alert and oriented    Nausea/Vomiting: no nausea/vomiting    Complications: none    Transfer of care protocol was followed      Last vitals:   Visit Vitals  BP (!) 143/63 (BP Location: Right arm)   Pulse 99   Temp 37.2 °C (99 °F) (Temporal)   Resp (!) 33   Ht 5' 3" (1.6 m)   Wt 90.7 kg (200 lb)   SpO2 (!) 93%   Breastfeeding No   BMI 35.43 kg/m²     "

## 2023-07-11 NOTE — PROCEDURES
DBS IntraOP Brain Mapping    HPI: 68 y.o. woman with ET coming for R VIM DBS for tremor control.        Target  VIM  Laterality R  Lead Model Medtronic [id number]   Lead Passes  1    Patient was stimulated over 120 minutes.     Pass1  Robust SAEED  Good tremor control on microstim - Mild fleeting parasthesias  Good tremor control on microstim - no parasthesias  [id number]    Final Position  Tip at 1 mm below target      _________________________________________    Zainab Shea MD, MS  [facility]  Department of Neurology  Movement Disorders

## 2023-07-11 NOTE — BRIEF OP NOTE
[facility] - Surgery ([facility])  Brief Operative Note    SUMMARY     Surgery Date: 7/11/2023     Surgeon(s) and Role:     * Bonifacio Kraft MD - Primary     * Karen Brown MD - Resident - Assisting        Pre-op Diagnosis:  Essential tremor [G25.0]    Post-op Diagnosis:  Post-Op Diagnosis Codes:     * Essential tremor [G25.0]    Procedure(s) (LRB):  INSERTION, FIDUCIAL SCREW, SKULL/DBS (N/A)  INSERTION, DEEP BRAIN STIMULATOR (Right)    Anesthesia: Local MAC    Operative Findings: R VIM DBS    Estimated Blood Loss: * No values recorded between 7/11/2023  7:56 AM and 7/11/2023 11:41 AM *    Estimated Blood Loss has been documented.         Specimens:   Specimen (24h ago, onward)      None            [id number]

## 2023-07-12 VITALS
DIASTOLIC BLOOD PRESSURE: 57 MMHG | BODY MASS INDEX: 35.44 KG/M2 | RESPIRATION RATE: 17 BRPM | HEIGHT: 63 IN | HEART RATE: 62 BPM | WEIGHT: 200 LBS | SYSTOLIC BLOOD PRESSURE: 114 MMHG | OXYGEN SATURATION: 93 % | TEMPERATURE: 98 F

## 2023-07-12 PROBLEM — G20.A1 PD (PARKINSON'S DISEASE): Status: ACTIVE | Noted: 2023-07-12

## 2023-07-12 PROCEDURE — 94761 N-INVAS EAR/PLS OXIMETRY MLT: CPT

## 2023-07-12 PROCEDURE — 25000003 PHARM REV CODE 250: Performed by: STUDENT IN AN ORGANIZED HEALTH CARE EDUCATION/TRAINING PROGRAM

## 2023-07-12 RX ORDER — OXYCODONE HYDROCHLORIDE 5 MG/1
5 TABLET ORAL EVERY 4 HOURS PRN
Qty: 30 TABLET | Refills: 0 | Status: ON HOLD | OUTPATIENT
Start: 2023-07-12 | End: 2024-02-12

## 2023-07-12 RX ADMIN — CARVEDILOL 12.5 MG: 12.5 TABLET, FILM COATED ORAL at 09:07

## 2023-07-12 RX ADMIN — OXYCODONE HYDROCHLORIDE 10 MG: 10 TABLET ORAL at 01:07

## 2023-07-12 RX ADMIN — LEVOTHYROXINE SODIUM 112 MCG: 112 TABLET ORAL at 06:07

## 2023-07-12 RX ADMIN — ACETAMINOPHEN 1000 MG: 500 TABLET ORAL at 06:07

## 2023-07-12 RX ADMIN — OXYCODONE HYDROCHLORIDE 5 MG: 5 TABLET ORAL at 11:07

## 2023-07-12 NOTE — PLAN OF CARE
[facility] - Neurosurgery (Hospital)  Discharge Assessment    Primary Care Provider: Victor Hugo Evans MD     Discharge Assessment (most recent)       BRIEF DISCHARGE ASSESSMENT - 07/12/23 1210          Discharge Planning    Assessment Type Discharge Planning Brief Assessment     Resource/Environmental Concerns none     Support Systems Children     Equipment Currently Used at Home none     Current Living Arrangements home     Patient/Family Anticipates Transition to home     Patient/Family Anticipated Services at Transition none     DME Needed Upon Discharge  none     Discharge Plan A Home     Discharge Plan B Home

## 2023-07-12 NOTE — DISCHARGE INSTRUCTIONS
--Patient stable for discharge to home    --Please take prescriptions as detailed in medication list    --All questions/concerns addressed and answered    --Please followup with neurosurgery clinic in 2 weeks for wound check with; to be arranged by Neurosurgery Clinic     --Please call immediately for any new onset nausea/vomiting/fever/chills, wound breakdown, numbness/tingling/weakness    Wound Care Instructions:  -If you have any dressings at discharge, please remove 48 hours after the surgery.  -If you have steri strips, do not remove, as they will fall off.  -If you have staples, do not remove, as they will be removed at clinic follow up.  -You may shower daily but do not soak or submerge wound in water.  -Scalp/head incisions, wash hair daily with baby shampoo and do not use hair products. Pat incision dry, do not rub.  -For head incisions, do not wear scarfs or hats.  -Keep all wounds clean, dry, and open to air.  -Do not apply creams or ointments to the wound.  -No driving while on narcotic pain medications  -Call Neurosurgery if the wound opens, drains, or becomes red

## 2023-07-12 NOTE — DISCHARGE SUMMARY
Kevin Nunez - Neurosurgery ([facility])  Neurosurgery  Discharge Summary      Patient Name: Anna Wagoner  MRN: [id number]  Admission Date: 7/11/2023  Hospital Length of Stay: 1 days  Discharge Date and Time:  07/12/2023 7:10 AM  Attending Physician: Bonifacio Kraft MD   Discharging Provider: Karen Brown MD  Primary Care Provider: iVctor Hugo Evans MD    HPI:   68 F with essential tremor presents for elective R VIM DBS      Procedure(s) (LRB):  INSERTION, FIDUCIAL SCREW, SKULL/DBS (N/A)  INSERTION, DEEP BRAIN STIMULATOR (Right)     Hospital Course: Patient was admitted for R VIM DBS on 7/11/2023 and underwent procedure without perioperative complications. The patient remained on abx until per surgical protocol and was kept on appropriate DVT prophylaxis during the course of admission. At the time of discharge, the patient was tolerating PO intake without N/V, dysphagia, denied bowel or bladder dysfunction, denied new neurological symptoms, and reported pain controlled with current regimen. The surgical site was without evidence of drainage, breakdown or infection and all staples/sutures were intact. The patient will follow up in clinic as indicated in discharge instructions. All questions were answered and continued treatment/wound care instructions were discussed in detail prior to discharge.        Goals of Care Treatment Preferences:         Consults:       Pending Diagnostic Studies:     None        Final Active Diagnoses:    Diagnosis Date Noted POA    PRINCIPAL PROBLEM:  PD (Parkinson's disease) [G20] 07/12/2023 Unknown      Problems Resolved During this Admission:      Discharged Condition: good     Disposition: Home or Self Care    Follow Up:   Follow-up Information     Bonifacio Kraft MD Follow up in 1 week(s).    Specialty: Neurosurgery  Why: For wound re-check  Contact information:  [address]  [facility] [address]  [phone]                       Patient Instructions:      Notify your health  care provider if you experience any of the following:  temperature >100.4     Notify your health care provider if you experience any of the following:  persistent nausea and vomiting or diarrhea     Notify your health care provider if you experience any of the following:  severe uncontrolled pain     Notify your health care provider if you experience any of the following:  redness, tenderness, or signs of infection (pain, swelling, redness, odor or green/yellow discharge around incision site)     Notify your health care provider if you experience any of the following:  difficulty breathing or increased cough     Notify your health care provider if you experience any of the following:  severe persistent headache     Notify your health care provider if you experience any of the following:  worsening rash     Notify your health care provider if you experience any of the following:  persistent dizziness, light-headedness, or visual disturbances     Activity as tolerated     Medications:  Reconciled Home Medications:      Medication List      START taking these medications    oxyCODONE 5 MG immediate release tablet  Commonly known as: ROXICODONE  Take 1 tablet (5 mg total) by mouth every 4 (four) hours as needed for Pain.        CONTINUE taking these medications    buPROPion 300 MG 24 hr tablet  Commonly known as: WELLBUTRIN XL  Take 300 mg by mouth daily as needed.     butalbital-acetaminophen-caff -40 mg Cap     carvediloL 12.5 MG tablet  Commonly known as: COREG  Take 12.5 mg by mouth 2 (two) times daily.     cholecalciferol (vitamin D3) 75 mcg (3,000 unit) Tab  Take 1 tablet by mouth once daily.     co-enzyme Q-10 30 mg capsule  Take 30 mg by mouth once daily.     ergocalciferol 50,000 unit Cap  Commonly known as: ERGOCALCIFEROL  Take 50,000 Units by mouth every 7 days.     famotidine 20 MG tablet  Commonly known as: PEPCID  Take 20 mg by mouth nightly.     ibuprofen 800 MG tablet  Commonly known as:  ADVIL,MOTRIN  Take 800 mg by mouth 2 (two) times daily as needed.     levothyroxine 112 MCG tablet  Commonly known as: SYNTHROID  Take 112 mcg by mouth.     liothyronine 5 MCG Tab  Commonly known as: CYTOMEL  Take 5 mcg by mouth.     pantoprazole 40 MG tablet  Commonly known as: PROTONIX  Take 40 mg by mouth.     rosuvastatin 10 MG tablet  Commonly known as: CRESTOR  Take 10 mg by mouth every evening.     sertraline 100 MG tablet  Commonly known as: ZOLOFT  Take 100 mg by mouth every evening.     tiZANidine 4 MG tablet  Commonly known as: ZANAFLEX  Take 4 mg by mouth nightly.        ASK your doctor about these medications    ondansetron 4 MG Tbdl  Commonly known as: ZOFRAN-ODT  Dissolve 1 tablet (4 mg total) by mouth every 8 (eight) hours as needed (nausea).            Karen Brown MD  Neurosurgery  [facility])

## 2023-07-12 NOTE — OP NOTE
Date of Operation:  7/11/2023.    Preoperative Diagnosis:  Essential Tremor.    Postoperative Diagnosis:  Essential Tremor.    Procedure:  Placement of skull fiducial screws for intraoperative neuro navigation.  Implantation of deep brain stimulating electrode (SenSight [id number], Medtronic) in right VIM thalamic nucleus with microelectrode recording (Brett Mcdonough, Shabbir, and Batool).  Connection to previously placed connecting lead.    Surgeon:  Bonifacio Kraft MD    Assistant:  Karen Brown MD (Resident in Neurosurgery)    Anesthesia: Local//MAC.    Estimated Blood Loss:  75 ml.    Condition at End of Procedure:  Satisfactory.    Brief History:  This 68-year-old lady with a long history of progressive tremor had implantation of a deep brain stimulating electrode in the left VIM thalamic nucleus on 04/18/2023.  This has worked very well for her.  She is now admitted for implantation of a DBS electrode in the right VIM thalamic nucleus.  This can be connected to the previously placed connecting lead on the left.    Procedure in Detail:  The patient was brought to the operating room and in the supine position on her gurney given mild IV sedation.  A Cabral catheter was inserted, sequential compression devices applied to the legs, and various intravenous lines started.  The head of the gurney was elevated and the scalp shaved, prepped with Betadine and draped in a sterile fashion.  Five fiducial marks were made with a crayon, 2 frontotemporal, 2 parietal, and 1 just to the left of midline and these were injected with 1% xylocaine and epinephrine.  At each location a stab wound was made with a 15 blade and the pericranium scraped from the skull.  A 10 mm Medtronic skull fiducial screw was inserted into the skull with a power screwdriver and tightened by hand.  All screws fit snugly.  The small incisions were closed with 3-0 nylon sutures and reinjected with 0.5% Marcaine and epinephrine.  The scalp was washed,  bacitracin ointment applied to the screws and the protective caps placed over them.  The head was wrapped with roller gauze and the patient brought to CT scan where CT scan with the fiducial screws was obtained to merge with MRI for intraoperative neuro navigation.  The navigation program was worked out and the fiducial screws registered to the system.  The patient was then returned to the operating room.    She was transferred from her [facility] and placed in the supine position on the operating table with the bed in a partial beach chair position and the head somewhat elevated and allowed to rest brow up on a Chappell horseshoe which had been carefully padded.  The roller gauze was removed and the protective caps taken off of the screws.  The nonsterile reference arc was attached to the scalp and the fiducial screws registered to the system with good tolerance.  The navigation program was then used to find the proposed entry site in the right posterior frontal area and this was marked with a crayon.  The scalp was prepped with Betadine, the bone marked with a bone awl an 18 gauge needle and the scalp covered with the Ioban shower curtain drape.  A small horseshoe was outlined around the entrance site and this was injected with 1% xylocaine and epinephrine.  The incision was carried through the skin and galea, bleeding controlled with bipolar coagulation and the small skin flap elevated and retracted anteriorly with a 2-0 silk suture rubber band.  A 14 mm mary grace hole was made with the  and widened internally with the M8 attachment.  The bur hole cover was affixed to the skull over the mary grace hole tightening the 2 screws.  The NexFrame ring was placed over the cover and these 3 screws tightened to this dull also.  Sterile reference arc was attached to the ring base and the fiducial screws again registered to the system with good fidelity.  The socket assembly was placed onto the ring base and the probe used to  find the trajectory and depth to target and these were marked on the Alpha Omega drive head stage.  The probe was exchanged for the multi lumen channel adapter and the drive head stage with its cables affixed to the ring base.  The cannula was brought down through the center hole to the dura, the dura coagulated and opened with a 11 blade and the cannula brought down to 25 mm above target.  The microelectrode collet was attached to the drive head stage and the microelectrode brought down to 15 mm above target.  With Doctors Shabbir Mcdonough, and Batool doing microelectrode recording and micro stimulation the electrode was advanced down to 2 mm below target.  Brisk thalamic recordings were obtained beginning about 8 mm above target and continuing down below target.  Stimulation was carried out from 1 mm below target to 9 mm above target with the best results between target and 7 mm above target.  The DBS electrode was then brought down with the tip 1 mm below target making the 1st electrode at target and the highest electrodes 7.5 mm above target.  Stimulation was effective with no side effects.  The microdrive assembly was removed and the DBS electrode capped into place.  The patient was then given additional anesthesia.  A small coronal incision was made over the left temporal line of the scalp, the scalp dissected and the DBS electrode brought through the suction from the right to the left side.  The plasma blade was used to reopen the incision over the buried DBS electrode and the connecting leads and the new DBS electrode attached to the now right-sided connecting lead and secured with the torque wrench.  The system was tested and found to have normal impedance and all channels.  The wounds were thoroughly irrigated, the primary incision was closed with inverted interrupted 4-0 Vicryl sutures and the 2 more lateral incisions closed with inverted interrupted 3-0 Vicryl sutures, the skin closed with skin  staples.  Telfa bacitracin dressings were placed over the wound and the head wrapped with roller gauze.  She was transferred back to her gurney and returned to the recovery area in stable condition.  She received 2 g of Rocephin at the beginning of the procedure and Rocephin containing antibiotic irrigating solutions were used throughout.

## 2023-07-12 NOTE — HOSPITAL COURSE
Patient was admitted for R VIM DBS on 7/11/2023 and underwent procedure without perioperative complications. The patient remained on abx until per surgical protocol and was kept on appropriate DVT prophylaxis during the course of admission. At the time of discharge, the patient was tolerating PO intake without N/V, dysphagia, denied bowel or bladder dysfunction, denied new neurological symptoms, and reported pain controlled with current regimen. The surgical site was without evidence of drainage, breakdown or infection and all staples/sutures were intact. The patient will follow up in clinic as indicated in discharge instructions. All questions were answered and continued treatment/wound care instructions were discussed in detail prior to discharge.

## 2023-07-12 NOTE — PLAN OF CARE
Kevin Steven - Neurosurgery (Hospital)  Discharge Final Note    Primary Care Provider: Victor Hugo Evans MD    Expected Discharge Date: 7/12/2023    Patient discharged home.  The patient does not have any home needs.  Family provided transportation home.  Neurosurgery clinic to schedule follow up appointment.    Future Appointments   Date Time Provider Department Center   7/27/2023  8:00 AM Zainab Shea MD [facility] [place] [address]   7/27/2023 12:00 PM Bonifacio Kraft MD [facility] [place] [address]        Final Discharge Note (most recent)       Final Note - 07/12/23 1211          Final Note    Assessment Type Final Discharge Note     Anticipated Discharge Disposition Home or Self Care        Post-Acute Status    Post-Acute Authorization Other     Other Status No Post-Acute Service Needs     Discharge Delays None known at this time                     Important Message from Medicare             Contact Info       Bonifacio Kraft MD   Specialty: Neurosurgery    [address]  [place] [address]   Phone: [phone]       Next Steps: Follow up in 1 week(s)    Instructions: For wound re-check

## 2023-07-14 NOTE — ANESTHESIA PREPROCEDURE EVALUATION
07/14/2023  Anna Wagoner is a 68 y.o., female.      Pre-op Assessment    I have reviewed the Patient Summary Reports.     I have reviewed the Nursing Notes. I have reviewed the NPO Status.   I have reviewed the Medications.     Review of Systems  Anesthesia Hx:  No problems with previous Anesthesia  History of prior surgery of interest to airway management or planning: Previous anesthesia: General  Denies Personal Hx of Anesthesia complications.   Cardiovascular:   Hypertension    Pulmonary:  Pulmonary Normal    Renal/:  Renal/ Normal     Hepatic/GI:  Hepatic/GI Normal    Musculoskeletal:   Arthritis     Neurological:   Parkinson's disease   Endocrine:   Hypothyroidism      Patient Active Problem List   Diagnosis    Essential tremor    Anxiety    Cervical spinal stenosis    Cervical spondylosis without myelopathy    Hypercholesterolemia    Hypothyroidism    RLS (restless legs syndrome)    Statin intolerance    Tremor    Vitamin D deficiency    Neck pain    PD (Parkinson's disease)     Past Medical History:   Diagnosis Date    Anxiety     Hashimoto's thyroiditis     Hypertension     Neck pain, chronic      Past Surgical History:   Procedure Laterality Date    BREAST SURGERY      CHOLECYSTECTOMY      DEEP BRAIN STIMULATOR PLACEMENT Left 4/18/2023    Procedure: INSERTION, DEEP BRAIN STIMULATOR;  Surgeon: Bonifacio Kraft MD;  Location: [facility] OR [address] [place] [address];  Service: Neurosurgery;  Laterality: Left;  INSERTION ELECTRODES FOR DEEP BRAIN STIMULATION/ BILATERAL VENTRALIS INTERMEDIUS/ LEFT SIDE FIRST, RIGHT SIDE SECOND/ TEDS & SCD/ MEDTRONICSSACHI.    DEEP BRAIN STIMULATOR PLACEMENT Right 7/11/2023    Procedure: INSERTION, DEEP BRAIN STIMULATOR;  Surgeon: Boinfacio Kraft MD;  Location: [facility] OR [address] [place] [address];  Service: Neurosurgery;  Laterality: Right;  INSERT ELECTRODE FOR DEEP BRAIN  STIMULATION/RIGHT VENTRALIS INTERMEDIUS/ TEDS & SCD/MEDTRONICS, PAPITO MORALES.    HYSTERECTOMY      INSERTION OF DEEP BRAIN STIMULATOR GENERATOR Left 4/25/2023    Procedure: INSERTION, PULSE GENERATOR, DEEP BRAIN STIMULATOR;  Surgeon: Bonifacio Kraft MD;  Location: [facility] OR 2ND FLR;  Service: Neurosurgery;  Laterality: Left;  INSERT PULSE GENERATOR FOR DEEP BRAIN STIMULATION/TEDS & SCD/ MEDTRONICS, SACHI MENCHACA.    PLACEMENT OF FIDUCIAL SCREW INTO SPINE N/A 4/18/2023    Procedure: INSERTION, FIDUCIAL SCREW, SKULL/DBS;  Surgeon: Bonifacio Kraft MD;  Location: [facility] OR 2ND FLR;  Service: Neurosurgery;  Laterality: N/A;  APPLICATION OF FIDUCIALS FOR DEEP BRAIN STIMULATION/ TEDS & SCD/ MEDTRONICS, SACHI MENCHACA.    PLACEMENT OF FIDUCIAL SCREW INTO SPINE N/A 7/11/2023    Procedure: INSERTION, FIDUCIAL SCREW, SKULL/DBS;  Surgeon: Bonifacio Kraft MD;  [facility];  Service: Neurosurgery;  Laterality: N/A;  INSERTION FIDUCIALS FOR DEEP BRAIN STIMULATION/ TEDS & SCD/ MEDTRONICS, PAPITO MORALES.    TONSILLECTOMY           Physical Exam  General: Well nourished, Cooperative and Alert    Airway:  Mallampati: II   Mouth Opening: Normal  TM Distance: Normal  Tongue: Normal  Neck ROM: Normal ROM    Dental:  Intact    Chest/Lungs:  Clear to auscultation, Normal Respiratory Rate    Heart:  Rate: Normal  Rhythm: Regular Rhythm  Sounds: Normal      Lab Results   Component Value Date    WBC 7.92 07/11/2023    HGB 13.6 07/11/2023    HCT 41.5 07/11/2023    MCV 88 07/11/2023     07/11/2023         Anesthesia Plan  Type of Anesthesia, risks & benefits discussed:    Anesthesia Type: Gen Natural Airway, Gen ETT, MAC  Intra-op Monitoring Plan: Standard ASA Monitors  Post Op Pain Control Plan: multimodal analgesia  Induction:  IV  Airway Plan: Direct, Post-Induction  Informed Consent: Informed consent signed with the Patient and all parties understand the risks and agree with anesthesia plan.  All questions answered.   ASA  Score: 2  Day of Surgery Review of History & Physical: H&P Update referred to the surgeon/provider.    Ready For Surgery From Anesthesia Perspective.     .

## 2023-07-14 NOTE — ANESTHESIA POSTPROCEDURE EVALUATION
Anesthesia Post Evaluation    Patient: Anna Wagoner    Procedure(s) Performed: Procedure(s) (LRB):  INSERTION, FIDUCIAL SCREW, SKULL/DBS (N/A)  INSERTION, DEEP BRAIN STIMULATOR (Right)    Final Anesthesia Type: general      Patient location during evaluation: PACU  Patient participation: Yes- Able to Participate  Level of consciousness: awake and alert and oriented  Post-procedure vital signs: reviewed and stable  Pain management: adequate  Airway patency: patent    PONV status at discharge: No PONV  Anesthetic complications: no      Cardiovascular status: hemodynamically stable  Respiratory status: unassisted, spontaneous ventilation and room air  Hydration status: euvolemic  Follow-up not needed.        VSS    Event Time   Out of Recovery 12:30:00         Pain/Yuni Score: No data recorded

## 2023-07-19 ENCOUNTER — PATIENT MESSAGE (OUTPATIENT)
Dept: SURGERY | Facility: HOSPITAL | Age: 68
End: 2023-07-19
Payer: MEDICARE

## 2023-07-21 ENCOUNTER — PATIENT MESSAGE (OUTPATIENT)
Dept: NEUROSURGERY | Facility: CLINIC | Age: 68
End: 2023-07-21
Payer: MEDICARE

## 2023-07-21 ENCOUNTER — TELEPHONE (OUTPATIENT)
Dept: NEUROLOGY | Facility: CLINIC | Age: 68
End: 2023-07-21
Payer: MEDICARE

## 2023-07-21 ENCOUNTER — PATIENT MESSAGE (OUTPATIENT)
Dept: NEUROLOGY | Facility: CLINIC | Age: 68
End: 2023-07-21
Payer: MEDICARE

## 2023-07-21 NOTE — TELEPHONE ENCOUNTER
Spoke with pt again to see if she would be willing to meet that Friday 07/28/23 at 9 AM since I was able to move everyone around. Pt will follow up with neurosurgery EJ their MA to see if he could move the post op on that date. Will keep in touch.

## 2023-07-21 NOTE — TELEPHONE ENCOUNTER
Called pt back regarding their [employer] message. Pt confirmed the 07/28/23 at 9 AM and will do the programming then.

## 2023-07-21 NOTE — TELEPHONE ENCOUNTER
Called pt to inform her that Dr. Shea is last minute having to cancel clinic to go out of town, that we would need to reschedule her appt on 07/27 at 8 AM. Pt is first checking with hotel to ensure she can make another reservation for 07/31

## 2023-07-24 ENCOUNTER — PATIENT MESSAGE (OUTPATIENT)
Dept: NEUROSURGERY | Facility: CLINIC | Age: 68
End: 2023-07-24
Payer: MEDICARE

## 2023-07-28 ENCOUNTER — OFFICE VISIT (OUTPATIENT)
Dept: NEUROLOGY | Facility: CLINIC | Age: 68
End: 2023-07-28
Payer: MEDICARE

## 2023-07-28 ENCOUNTER — OFFICE VISIT (OUTPATIENT)
Dept: NEUROSURGERY | Facility: CLINIC | Age: 68
End: 2023-07-28
Payer: MEDICARE

## 2023-07-28 VITALS
WEIGHT: 216.63 LBS | BODY MASS INDEX: 38.38 KG/M2 | SYSTOLIC BLOOD PRESSURE: 123 MMHG | HEART RATE: 60 BPM | DIASTOLIC BLOOD PRESSURE: 65 MMHG | HEIGHT: 63 IN

## 2023-07-28 VITALS
BODY MASS INDEX: 35.44 KG/M2 | DIASTOLIC BLOOD PRESSURE: 65 MMHG | SYSTOLIC BLOOD PRESSURE: 123 MMHG | HEART RATE: 60 BPM | HEIGHT: 63 IN | WEIGHT: 200 LBS

## 2023-07-28 DIAGNOSIS — G25.0 ESSENTIAL TREMOR: ICD-10-CM

## 2023-07-28 DIAGNOSIS — G25.0 ESSENTIAL TREMOR: Primary | ICD-10-CM

## 2023-07-28 DIAGNOSIS — M54.9 BACK PAIN, UNSPECIFIED BACK LOCATION, UNSPECIFIED BACK PAIN LATERALITY, UNSPECIFIED CHRONICITY: ICD-10-CM

## 2023-07-28 PROCEDURE — 99024 POSTOP FOLLOW-UP VISIT: CPT | Mod: POP,,, | Performed by: NEUROLOGICAL SURGERY

## 2023-07-28 PROCEDURE — 95984 ALYS BRN NPGT PRGRMG ADDL 15: CPT | Mod: PBBFAC | Performed by: PSYCHIATRY & NEUROLOGY

## 2023-07-28 PROCEDURE — 99215 PR OFFICE/OUTPT VISIT, EST, LEVL V, 40-54 MIN: ICD-10-PCS | Mod: 25,S$PBB,, | Performed by: PSYCHIATRY & NEUROLOGY

## 2023-07-28 PROCEDURE — 99999 PR PBB SHADOW E&M-EST. PATIENT-LVL III: ICD-10-PCS | Mod: PBBFAC,,, | Performed by: PSYCHIATRY & NEUROLOGY

## 2023-07-28 PROCEDURE — 95983 ALYS BRN NPGT PRGRMG 15 MIN: CPT | Mod: S$PBB,,, | Performed by: PSYCHIATRY & NEUROLOGY

## 2023-07-28 PROCEDURE — 95983 PR ELEC ANALYSIS, IMPLT NEURO PULSE GEN, W/PRGRM, BRAIN, 1ST 15 MINS: ICD-10-PCS | Mod: S$PBB,,, | Performed by: PSYCHIATRY & NEUROLOGY

## 2023-07-28 PROCEDURE — 99215 OFFICE O/P EST HI 40 MIN: CPT | Mod: 25,S$PBB,, | Performed by: PSYCHIATRY & NEUROLOGY

## 2023-07-28 PROCEDURE — 95984 PR ELEC ANALYSIS, IMPLT NEURO PULSE GEN, W/PRGRM, BRAIN,  EA ADDTL 15 MINS: ICD-10-PCS | Mod: S$PBB,,, | Performed by: PSYCHIATRY & NEUROLOGY

## 2023-07-28 PROCEDURE — 95983 ALYS BRN NPGT PRGRMG 15 MIN: CPT | Mod: PBBFAC | Performed by: PSYCHIATRY & NEUROLOGY

## 2023-07-28 PROCEDURE — 99999 PR PBB SHADOW E&M-EST. PATIENT-LVL III: CPT | Mod: PBBFAC,,, | Performed by: PSYCHIATRY & NEUROLOGY

## 2023-07-28 PROCEDURE — 99417 PROLNG OP E/M EACH 15 MIN: CPT | Mod: S$PBB,,, | Performed by: PSYCHIATRY & NEUROLOGY

## 2023-07-28 PROCEDURE — 99214 OFFICE O/P EST MOD 30 MIN: CPT | Mod: PBBFAC | Performed by: NEUROLOGICAL SURGERY

## 2023-07-28 PROCEDURE — 99213 OFFICE O/P EST LOW 20 MIN: CPT | Mod: PBBFAC,27 | Performed by: PSYCHIATRY & NEUROLOGY

## 2023-07-28 PROCEDURE — 99417 PR PROLONGED SVC, OUTPT, W/WO DIRECT PT CONTACT,  EA ADDTL 15 MIN: ICD-10-PCS | Mod: S$PBB,,, | Performed by: PSYCHIATRY & NEUROLOGY

## 2023-07-28 PROCEDURE — 99024 PR POST-OP FOLLOW-UP VISIT: ICD-10-PCS | Mod: POP,,, | Performed by: NEUROLOGICAL SURGERY

## 2023-07-28 PROCEDURE — 95984 ALYS BRN NPGT PRGRMG ADDL 15: CPT | Mod: S$PBB,,, | Performed by: PSYCHIATRY & NEUROLOGY

## 2023-07-28 PROCEDURE — 99999 PR PBB SHADOW E&M-EST. PATIENT-LVL IV: ICD-10-PCS | Mod: PBBFAC,,, | Performed by: NEUROLOGICAL SURGERY

## 2023-07-28 PROCEDURE — 99999 PR PBB SHADOW E&M-EST. PATIENT-LVL IV: CPT | Mod: PBBFAC,,, | Performed by: NEUROLOGICAL SURGERY

## 2023-07-28 RX ORDER — BUSPIRONE HYDROCHLORIDE 15 MG/1
7.5-15 TABLET ORAL
COMMUNITY
Start: 2023-07-26 | End: 2024-07-25

## 2023-07-28 RX ORDER — METHOCARBAMOL 750 MG/1
750 TABLET, FILM COATED ORAL
Status: ON HOLD | COMMUNITY
Start: 2023-07-22 | End: 2024-02-12

## 2023-07-28 RX ORDER — GABAPENTIN 300 MG/1
300 CAPSULE ORAL NIGHTLY
Qty: 30 CAPSULE | Refills: 11 | Status: SHIPPED | OUTPATIENT
Start: 2023-07-28 | End: 2023-09-29

## 2023-07-28 RX ORDER — DOXYCYCLINE HYCLATE 100 MG
100 TABLET ORAL 2 TIMES DAILY
COMMUNITY
Start: 2023-07-26 | End: 2023-08-05

## 2023-07-28 RX ORDER — PREDNISONE 10 MG/1
10 TABLET ORAL 2 TIMES DAILY
Status: ON HOLD | COMMUNITY
Start: 2023-07-22 | End: 2024-02-12

## 2023-07-28 NOTE — ASSESSMENT & PLAN NOTE
Longtsanding ET-like tremor  At times dystonic pulling R hand and possibly neck typical to ET  ETOH responsive      2nd side monopolar review today  Turned both side DBS VIM ON  Made new groups to address dysarthria and tremor with L VIM  Group C interleaving  Groups A and B also new  D old    Good tremor control       She knows to turn DBS OFF at nigh and ON in the morning

## 2023-07-28 NOTE — PROGRESS NOTES
Anna Wagoner was seen in neurosurgical follow-up at the office this morning.  She was admitted to [facility] on 07/11/2023 and underwent implantation of a deep brain stimulating electrode in the right VIM thalamic nucleus.  The electrode was connected to the previously placed connecting leads on the left side.  She has been doing well neurologically but has had some flare-up of low back and right lower extremity pain and has felt short of breath.  She was seen in medicine follow-up and apparently had a radioisotope test to rule out pulmonary embolism.  Symptoms seem to be improving.  She returns today for staple removal and for programming.    On brief examination she is alert and cooperative.  Her incisions are well healed and the staples were removed.  Tremor is minimal today even before programming.    Dr. Shea will program her this morning and I will [facility].

## 2023-07-28 NOTE — PROGRESS NOTES
Movement Disorders - Here for DBS turn-on    Name: Anna Wagoner  MRN: [id number]   CSN: [id number]      Date: 07/28/2023    Referring physician:  No referring provider defined for this encounter.    Chief Complaint: tremor     Interval Hx  Now B/L DBS  Had R VIM  Some SOB after surgery and on ABX  Since last visit,   Had L VIM placement  R hand shaking less- some honeymoon effect    Continues to turn DBS OFF at night    Previous  Interval Hx  Since last visit with RR, comes for evaluation for DBS  R handed woman.  Tried the CHERI trio which did not help.  He reports a b/l hand tremor since 9-9yo slowly progressive over her lifetime L>R in the last few years. Started to have a vocal tremor and head tremor in the last years. At this point she can no longer put on makeup, cannot write. At times drops items. Struggles to type and double types.    Since several year her R hand may cramp and fingers draw up.  At times feet cramp.  Chronic neck strain. Mentions she hs spinal stenosis but no record of MRI documenting.  Takes zanaflex 8mg QHS.  Had hallucinations on propranolol    Reports had a DATscan 2021 - [place]    At times unsteady gait. Falls once a year.  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 Some word-finding and repeating asking for tasks.    Son has a tremor  Otherwise no fam hx tremor    ----------------  Prior  Interval History:  - cheri-trio 60 day trial -- did not notice any improvement with the tremor  - L hand is a little worse, some change in direction   - notices it when holding things   - drops things with her R hand   - now off of phentermine   - feels that tremors slowed down with phentermine and even more with the increased dose   - drinks one cup of coffee in the morning    - tremor improves with etoh   - chronic headaches/migraines -- interested in seeing headache specialist here         From July 2022: Anna Wagoner is a R HANDED 68 y.o. female with a medical  issues significant for HTN, HLD, hypothyroidism (hashimotos), and anxiety who presents for tremors. Referral from [facility]. Accompanied by daughter. Previously on primidone which she felt caused weight gain and constipation. No longer on it. She tried gabapentin and topamax in the past but stop due to cognitive side effects.  Propranolol caused nightmares and hallucinations?   She has had the tremors since she was at least 11 or 12 years old. Worse on the L hand now, feels that the tremor is progressing. Notices tremor whenever she is typing as well as whenever she is holding something. It does affect her handwriting. Tremor improves with EtOH. She is in a facebook ET group. She works full-time in [employer]. Plans to work for 5 more years.   Primidone helped the tremor but after a few weeks didn't find it as effective. Max dose was 100 mg BID. Felt that she was gaining weight on primidone. Does not think that this was because of thyroid issues. Per chart review, clonazepam caused daytime lethargy but she does not recall this. She asks about medications that she can take during the day.   Very sensitive to side effects of medications.   Currently taking phentermine right now for weight loss. Learning how to eat different. Years ago she tried topamax but caused cognitive side effects.    Not interested in DBS or focused ultrasound.   Asks about BPPV, sees ENT in [place].       From outside neurology provider note  67 y.o. female presents today for neurological follow up. Last seen in follow up with Dr. Desai in January 2022, when primidone was prescribed given findings most consistent with essential tremor. She does have an essential tremor (both limbs and voice) which she has had since about age 11. Tremor more noticeable in left upper extremity than right. Requests referral to movement specialist. Scheduled to see weight-loss specialist given weight gain she attributes to Primidone.   Neurocognitive  status: Neurocognitive function has remained stable.   Current pertinent medications:Primidone triggered weight gain and constipation, so she has discontinued. She tried gabapentin and topamax, but discontinued due to cognitive side effects. Klonopin prn triggered daytime lethargy. Propranolol triggered nightmares and hallucinations.         Family History: none     Neuroleptic Exposure: none        Review of Systems:   Review of Systems   Constitutional:  Negative for chills, fever and malaise/fatigue.   HENT:  Negative for hearing loss.    Eyes:  Negative for blurred vision and double vision.   Respiratory:  Negative for cough, shortness of breath and stridor.    Cardiovascular:  Negative for chest pain and leg swelling.   Gastrointestinal:  Negative for constipation, diarrhea and nausea.   Genitourinary:  Negative for frequency and urgency.   Musculoskeletal:  Negative for falls.   Skin:  Negative for itching and rash.   Neurological:  Positive for tremors. Negative for dizziness, loss of consciousness and weakness.   Psychiatric/Behavioral:  Negative for hallucinations and memory loss.          Past Medical History: The patient  has a past medical history of Anxiety, Hashimoto's thyroiditis, Hypertension, and Neck pain, chronic.    Social History: The patient  reports that she has quit smoking. Her smoking use included cigarettes. She has never been exposed to tobacco smoke. She has never used smokeless tobacco.    Family History: Their family history includes Tremor in her mother.    Allergies: Metoprolol succinate     Meds:   Current Outpatient Medications on File Prior to Visit   Medication Sig Dispense Refill    buPROPion (WELLBUTRIN XL) 300 MG 24 hr tablet Take 300 mg by mouth daily as needed.      butalbital-acetaminophen-caff -40 mg Cap       carvediloL (COREG) 12.5 MG tablet Take 12.5 mg by mouth 2 (two) times daily.      cholecalciferol, vitamin D3, 75 mcg (3,000 unit) Tab Take 1 tablet by mouth  "once daily.      co-enzyme Q-10 30 mg capsule Take 30 mg by mouth once daily.      ergocalciferol (ERGOCALCIFEROL) 50,000 unit Cap Take 50,000 Units by mouth every 7 days.      famotidine (PEPCID) 20 MG tablet Take 20 mg by mouth nightly.      ibuprofen (ADVIL,MOTRIN) 800 MG tablet Take 800 mg by mouth 2 (two) times daily as needed.      levothyroxine (SYNTHROID) 112 MCG tablet Take 112 mcg by mouth.      liothyronine (CYTOMEL) 5 MCG Tab Take 5 mcg by mouth.      ondansetron (ZOFRAN-ODT) 4 MG TbDL Dissolve 1 tablet (4 mg total) by mouth every 8 (eight) hours as needed (nausea). 30 tablet 0    oxyCODONE (ROXICODONE) 5 MG immediate release tablet Take 1 tablet (5 mg total) by mouth every 4 (four) hours as needed for Pain. 30 tablet 0    pantoprazole (PROTONIX) 40 MG tablet Take 40 mg by mouth.      rosuvastatin (CRESTOR) 10 MG tablet Take 10 mg by mouth every evening.      sertraline (ZOLOFT) 100 MG tablet Take 100 mg by mouth every evening.      tiZANidine (ZANAFLEX) 4 MG tablet Take 4 mg by mouth nightly.       No current facility-administered medications on file prior to visit.       Exam:  /65   Pulse 60   Ht 5' 3" (1.6 m)   Wt 98.2 kg (216 lb 9.6 oz)   BMI 38.37 kg/m²     Constitutional  Well-developed, well-nourished, appears stated age   Ophthalmoscopic  No papilledema with no hemorrhages or exudates bilaterally   Cardiovascular  Radial pulses 2+ and symmetric, no LE edema bilaterally   Neurological    * Mental status  MOCA =      - Orientation  Oriented to person, place, time, and situation     - Memory   Intact recent and remote     - Attention/concentration  Attentive, vigilant during exam     - Language  Naming & repetition intact, +2-step commands     - Fund of knowledge  Aware of current events     - Executive  Well-organized thoughts     - Other     * Cranial nerves       - CN II  PERRL, visual fields full to confrontation     - CN III, IV, VI  Extraocular movements full, normal pursuits and " "saccades     - CN V  Sensation V1 - V3 intact     - CN VII  Face strong and symmetric bilaterally     - CN VIII  Hearing intact bilaterally     - CN IX, X  Palate raises midline and symmetric     - CN XI  SCM and trapezius 5/5 bilaterally     - CN XII  Tongue midline   * Motor  Muscle bulk normal, strength 5/5 throughout   * Sensory   Intact to temperature    * Coordination  No dysmetria with finger-to-nose or heel-to-shin   * Gait  See below.   * Deep tendon reflexes  3+ palellae b/l  Hoffmans NEG   * Specialized movement exam  No hypophonic speech, very pleasant, appropriately animated    No facial masking.   No cogwheel rigidity     Mild wide-based gait   No shortened stride length.   No abnormal arm swing.     No postural instability.              Tremor Exam   Arms extended Arms in wing position, fingers almost touching Re-emergent Arms extended wrists extended Intention Resting Kinetic   Left ?+ ? ? ? ?+ ? ?+   Right ? ? ? ? ? ? ?   Head tremor: No-No   Vocal Tremor on EEEs and AAAs: POS  Leg tremor: POS      Archimedes Spirals   Left ?++   Right ?+         _______________________________________  Procedure:  DBS MRI Compatibility INFO  07/28/2023    IPG Model(s) L95185   Serial No. [id number]   Impedance OK   Battery  OK   Pocket Adapter  NO   Battery information: status ok.      DBS info  L VIM can cause dysartrhia    Initial      Monopolar review  Right VIM  Pulse width set at 60; Frequency set at 160  left VIM Monopolar review  C&11 @ 3.5mA no SFX mod tremor control  C&10 @ 3.5mA no SFX mod tremor control  C&9 @ 2.5 fleeting parasthesias mod tremor control BEST  C&8 @ 1.0mA - brief paraesthesias better typing   @2.0mA - brief paraesthesias better typing    Programming  Made interleaving to avoid speech slot for L hand        ================  "Monopolar Review  LEFT  Pulse width set at 60; Frequency set at 160  left VIM Monopolar review  C&3 @3.0 no SFX  C&2 @3.0mA speech slur  C&1 @1.0 tingling R " arm   @1.4 speech changed  C&0 @1mA pulling and tingling leg and arm    Activated   C+  60/160  2a - @3.0 no tingling  2b - @3.0 no tingling  2c - @3.0 light tingling    Made 3 groups - avoid deep contacts  FINAL  A omni directional  B  C            Laboratory/Radiological:  - Results:  Admission on 07/11/2023, Discharged on 07/12/2023   Component Date Value Ref Range Status    WBC 07/11/2023 7.92  3.90 - 12.70 K/uL Final    RBC 07/11/2023 4.72  4.00 - 5.40 M/uL Final    Hemoglobin 07/11/2023 13.6  12.0 - 16.0 g/dL Final    Hematocrit 07/11/2023 41.5  37.0 - 48.5 % Final    MCV 07/11/2023 88  82 - 98 fL Final    MCH 07/11/2023 28.8  27.0 - 31.0 pg Final    MCHC 07/11/2023 32.8  32.0 - 36.0 g/dL Final    RDW 07/11/2023 13.2  11.5 - 14.5 % Final    Platelets 07/11/2023 224  150 - 450 K/uL Final    MPV 07/11/2023 11.7  9.2 - 12.9 fL Final    Immature Granulocytes 07/11/2023 0.3  0.0 - 0.5 % Final    Gran # (ANC) 07/11/2023 4.2  1.8 - 7.7 K/uL Final    Immature Grans (Abs) 07/11/2023 0.02  0.00 - 0.04 K/uL Final    Lymph # 07/11/2023 2.6  1.0 - 4.8 K/uL Final    Mono # 07/11/2023 0.8  0.3 - 1.0 K/uL Final    Eos # 07/11/2023 0.3  0.0 - 0.5 K/uL Final    Baso # 07/11/2023 0.06  0.00 - 0.20 K/uL Final    nRBC 07/11/2023 0  0 /100 WBC Final    Gran % 07/11/2023 53.2  38.0 - 73.0 % Final    Lymph % 07/11/2023 32.4  18.0 - 48.0 % Final    Mono % 07/11/2023 9.6  4.0 - 15.0 % Final    Eosinophil % 07/11/2023 3.7  0.0 - 8.0 % Final    Basophil % 07/11/2023 0.8  0.0 - 1.9 % Final    Differential Method 07/11/2023 Automated   Final    aPTT 07/11/2023 28.2  21.0 - 32.0 sec Final    Prothrombin Time 07/11/2023 11.1  9.0 - 12.5 sec Final    INR 07/11/2023 1.0  0.8 - 1.2 Final    Group & Rh 07/11/2023 A NEG   Final    Indirect Danitza 07/11/2023 NEG   Final    Specimen Outdate 07/11/2023 07/14/2023 23:59   Final   Lab Visit on 06/26/2023   Component Date Value Ref Range Status    Specimen UA 06/26/2023 Urine, Clean Catch   Final     Color, UA 06/26/2023 Yellow  Yellow, Straw, Yamila Final    Appearance, UA 06/26/2023 Hazy (A)  Clear Final    pH, UA 06/26/2023 6.0  5.0 - 8.0 Final    Specific Gravity, UA 06/26/2023 1.020  1.005 - 1.030 Final    Protein, UA 06/26/2023 Trace (A)  Negative Final    Glucose, UA 06/26/2023 Negative  Negative Final    Ketones, UA 06/26/2023 Negative  Negative Final    Bilirubin (UA) 06/26/2023 Negative  Negative Final    Occult Blood UA 06/26/2023 Negative  Negative Final    Nitrite, UA 06/26/2023 Negative  Negative Final    Leukocytes, UA 06/26/2023 Negative  Negative Final   Lab Visit on 06/26/2023   Component Date Value Ref Range Status    WBC 06/26/2023 8.88  3.90 - 12.70 K/uL Final    RBC 06/26/2023 5.03  4.00 - 5.40 M/uL Final    Hemoglobin 06/26/2023 14.3  12.0 - 16.0 g/dL Final    Hematocrit 06/26/2023 44.2  37.0 - 48.5 % Final    MCV 06/26/2023 88  82 - 98 fL Final    MCH 06/26/2023 28.4  27.0 - 31.0 pg Final    MCHC 06/26/2023 32.4  32.0 - 36.0 g/dL Final    RDW 06/26/2023 13.3  11.5 - 14.5 % Final    Platelets 06/26/2023 305  150 - 450 K/uL Final    MPV 06/26/2023 11.2  9.2 - 12.9 fL Final    Immature Granulocytes 06/26/2023 0.3  0.0 - 0.5 % Final    Gran # (ANC) 06/26/2023 5.7  1.8 - 7.7 K/uL Final    Immature Grans (Abs) 06/26/2023 0.03  0.00 - 0.04 K/uL Final    Lymph # 06/26/2023 2.2  1.0 - 4.8 K/uL Final    Mono # 06/26/2023 0.7  0.3 - 1.0 K/uL Final    Eos # 06/26/2023 0.2  0.0 - 0.5 K/uL Final    Baso # 06/26/2023 0.07  0.00 - 0.20 K/uL Final    nRBC 06/26/2023 0  0 /100 WBC Final    Gran % 06/26/2023 63.8  38.0 - 73.0 % Final    Lymph % 06/26/2023 25.2  18.0 - 48.0 % Final    Mono % 06/26/2023 7.5  4.0 - 15.0 % Final    Eosinophil % 06/26/2023 2.4  0.0 - 8.0 % Final    Basophil % 06/26/2023 0.8  0.0 - 1.9 % Final    Differential Method 06/26/2023 Automated   Final    Sodium 06/26/2023 145  136 - 145 mmol/L Final    Potassium 06/26/2023 4.2  3.5 - 5.1 mmol/L Final    Chloride 06/26/2023 106  95 - 110  mmol/L Final    CO2 06/26/2023 27  23 - 29 mmol/L Final    Glucose 06/26/2023 96  70 - 110 mg/dL Final    BUN 06/26/2023 14  8 - 23 mg/dL Final    Creatinine 06/26/2023 0.9  0.5 - 1.4 mg/dL Final    Calcium 06/26/2023 10.1  8.7 - 10.5 mg/dL Final    Total Protein 06/26/2023 8.0  6.0 - 8.4 g/dL Final    Albumin 06/26/2023 4.0  3.5 - 5.2 g/dL Final    Total Bilirubin 06/26/2023 0.6  0.1 - 1.0 mg/dL Final    Alkaline Phosphatase 06/26/2023 83  55 - 135 U/L Final    AST 06/26/2023 22  10 - 40 U/L Final    ALT 06/26/2023 16  10 - 44 U/L Final    eGFR 06/26/2023 >60.0  >60 mL/min/1.73 m^2 Final    Anion Gap 06/26/2023 12  8 - 16 mmol/L Final    Prothrombin Time 06/26/2023 11.1  9.0 - 12.5 sec Final    INR 06/26/2023 1.0  0.8 - 1.2 Final    aPTT 06/26/2023 26.5  21.0 - 32.0 sec Final         Problem List Items Addressed This Visit          Neuro    Essential tremor    Current Assessment & Plan     Longtsanding ET-like tremor  At times dystonic pulling R hand and possibly neck typical to ET  ETOH responsive      2nd side monopolar review today  Turned both side DBS VIM ON  Made new groups to address dysarthria and tremor with L VIM  Group C interleaving  Groups A and B also new  D old    Good tremor control       She knows to turn DBS OFF at [facility] and ON in the morning            I spent 60 minutes programming DBS      Zainab Shea MD, MS  [facility]  Department of Neurology  Movement Disorders

## 2023-08-10 ENCOUNTER — PATIENT MESSAGE (OUTPATIENT)
Dept: NEUROLOGY | Facility: CLINIC | Age: 68
End: 2023-08-10
Payer: MEDICARE

## 2023-08-11 ENCOUNTER — PATIENT MESSAGE (OUTPATIENT)
Dept: NEUROSURGERY | Facility: CLINIC | Age: 68
End: 2023-08-11
Payer: MEDICARE

## 2023-08-18 ENCOUNTER — PATIENT MESSAGE (OUTPATIENT)
Dept: NEUROSURGERY | Facility: CLINIC | Age: 68
End: 2023-08-18
Payer: MEDICARE

## 2023-08-30 ENCOUNTER — PATIENT MESSAGE (OUTPATIENT)
Dept: NEUROLOGY | Facility: CLINIC | Age: 68
End: 2023-08-30
Payer: MEDICARE

## 2023-08-30 ENCOUNTER — PATIENT MESSAGE (OUTPATIENT)
Dept: NEUROSURGERY | Facility: CLINIC | Age: 68
End: 2023-08-30
Payer: MEDICARE

## 2023-09-01 ENCOUNTER — TELEPHONE (OUTPATIENT)
Dept: NEUROLOGY | Facility: CLINIC | Age: 68
End: 2023-09-01
Payer: MEDICARE

## 2023-09-01 NOTE — TELEPHONE ENCOUNTER
Faxed over filled MRI DBS Eligibility form and office note from pt's last programming in 07/28/23 since that was when her last impedance check was.

## 2023-09-01 NOTE — TELEPHONE ENCOUNTER
"----- Message from Maria Elena Paul sent at 9/1/2023  9:53 AM CDT -----  Regarding: advise; release form- [facility]  Contact: Kiley @ [phone]  CallerKiley with [facility] is calling asking to speak with someone in the office or send a fax for a release form for "Neuro-Device" for brain stimulator. Caller states that EJ with Dr. Kraft's office advised her to reach out to Farhana.     Caller is asking to please fax form to: [phone]. If any a questions, please call Kiley at: [phone]. Thanks.     "

## 2023-09-01 NOTE — TELEPHONE ENCOUNTER
"----- Message from Maria Elena Paul sent at 9/1/2023  9:53 AM CDT -----  Regarding: advise; release form- [facility]  Contact: Kiley @ [phone]  CallerKiley with [facility] is calling asking to speak with someone in the office or send a fax for a release form for "Neuro-Device" for brain stimulator. Caller states that EJ with Dr. Kraft's office advised her to reach out to Farhana.     Caller is asking to please fax form to: [phone]. If any a questions, please call Kiley at: [phone]. Thanks.     "

## 2023-09-06 ENCOUNTER — PATIENT MESSAGE (OUTPATIENT)
Dept: NEUROLOGY | Facility: CLINIC | Age: 68
End: 2023-09-06
Payer: MEDICARE

## 2023-09-21 ENCOUNTER — PATIENT MESSAGE (OUTPATIENT)
Dept: NEUROLOGY | Facility: CLINIC | Age: 68
End: 2023-09-21
Payer: MEDICARE

## 2023-09-22 DIAGNOSIS — M54.9 BACK PAIN, UNSPECIFIED BACK LOCATION, UNSPECIFIED BACK PAIN LATERALITY, UNSPECIFIED CHRONICITY: Primary | ICD-10-CM

## 2023-09-29 ENCOUNTER — TELEPHONE (OUTPATIENT)
Dept: NEUROLOGY | Facility: CLINIC | Age: 68
End: 2023-09-29
Payer: MEDICARE

## 2023-09-29 ENCOUNTER — OFFICE VISIT (OUTPATIENT)
Dept: NEUROLOGY | Facility: CLINIC | Age: 68
End: 2023-09-29
Payer: MEDICARE

## 2023-09-29 DIAGNOSIS — R25.1 TREMOR: ICD-10-CM

## 2023-09-29 PROCEDURE — 99214 OFFICE O/P EST MOD 30 MIN: CPT | Mod: 95,,, | Performed by: PSYCHIATRY & NEUROLOGY

## 2023-09-29 PROCEDURE — 99214 PR OFFICE/OUTPT VISIT, EST, LEVL IV, 30-39 MIN: ICD-10-PCS | Mod: 95,,, | Performed by: PSYCHIATRY & NEUROLOGY

## 2023-09-29 NOTE — ASSESSMENT & PLAN NOTE
Longtsanding ET-like tremor  At times dystonic pulling R hand and possibly neck typical to ET  ETOH responsive  She has a myoclonic tremor which today appears to be from lyrica  Suggested decrease lyrica off after she gets her pain injection    DBS settings  Set 3 settings  A-omni  B-directional 1  C-directional 2    She knows to turn DBS OFF at nigh and ON in the morning

## 2023-09-29 NOTE — PROGRESS NOTES
The patient location is: HOME  The chief complaint leading to visit is: ET  1. Tremor          Visit type: Virtual visit with synchronous audio and video  Total time spent with patient: 40  Each patient to whom he or she provides medical services by telemedicine is:  (1) informed of the relationship between the physician and patient and the respective role of any other health care provider with respect to management of the patient; and (2) notified that he or she may decline to receive medical services by telemedicine and may withdraw from such care at any time.      Movement Disorders -   Name: Anna Wagoner  MRN: [id number]   CSN: [id number]      Date: 09/29/2023    Referring physician:  No referring provider defined for this encounter.    Chief Complaint: tremor     Interval Hx  B.L VIM DBS    Switched between several groups  At times C affects speech  Continues to turn DBS OFF at night    Recently put on lyrica after which her tremor increased    Previous  Interval Hx  Since last visit with RR, comes for evaluation for DBS  R handed woman.  Tried the CHERI trio which did not help.  He reports a b/l hand tremor since 9-9yo slowly progressive over her lifetime L>R in the last few years. Started to have a vocal tremor and head tremor in the last years. At this point she can no longer put on makeup, cannot write. At times drops items. Struggles to type and double types.    Since several year her R hand may cramp and fingers draw up.  At times feet cramp.  Chronic neck strain. Mentions she hs spinal stenosis but no record of MRI documenting.  Takes zanaflex 8mg QHS.  Had hallucinations on propranolol    Reports had a DATscan 2021 - [place]    At times unsteady gait. Falls once a year.  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 Some word-finding and repeating asking for tasks.    Son has a tremor  Otherwise no fam hx tremor    ----------------  Prior  Interval History:  - cheri-trio 60 day  trial -- did not notice any improvement with the tremor  - L hand is a little worse, some change in direction   - notices it when holding things   - drops things with her R hand   - now off of phentermine   - feels that tremors slowed down with phentermine and even more with the increased dose   - drinks one cup of coffee in the morning    - tremor improves with etoh   - chronic headaches/migraines -- interested in seeing headache specialist here         From July 2022: Anna Wagoner is a R HANDED 68 y.o. female with a medical issues significant for HTN, HLD, hypothyroidism (hashimotos), and anxiety who presents for tremors. Referral from [facility]. Accompanied by daughter. Previously on primidone which she felt caused weight gain and constipation. No longer on it. She tried gabapentin and topamax in the past but stop due to cognitive side effects.  Propranolol caused nightmares and hallucinations?   She has had the tremors since she was at least 11 or 12 years old. Worse on the L hand now, feels that the tremor is progressing. Notices tremor whenever she is typing as well as whenever she is holding something. It does affect her handwriting. Tremor improves with EtOH. She is in a facebook ET group. She works full-time in [employer]. Plans to work for 5 more years.   Primidone helped the tremor but after a few weeks didn't find it as effective. Max dose was 100 mg BID. Felt that she was gaining weight on primidone. Does not think that this was because of thyroid issues. Per chart review, clonazepam caused daytime lethargy but she does not recall this. She asks about medications that she can take during the day.   Very sensitive to side effects of medications.   Currently taking phentermine right now for weight loss. Learning how to eat different. Years ago she tried topamax but caused cognitive side effects.    Not interested in DBS or focused ultrasound.   Asks about BPPV, sees ENT in [place].        From outside neurology provider note  67 y.o. female presents today for neurological follow up. Last seen in follow up with Dr. Desai in January 2022, when primidone was prescribed given findings most consistent with essential tremor. She does have an essential tremor (both limbs and voice) which she has had since about age 11. Tremor more noticeable in left upper extremity than right. Requests referral to movement specialist. Scheduled to see weight-loss specialist given weight gain she attributes to Primidone.   Neurocognitive status: Neurocognitive function has remained stable.   Current pertinent medications:Primidone triggered weight gain and constipation, so she has discontinued. She tried gabapentin and topamax, but discontinued due to cognitive side effects. Klonopin prn triggered daytime lethargy. Propranolol triggered nightmares and hallucinations.         Family History: none     Neuroleptic Exposure: none        Review of Systems:   Review of Systems   Constitutional:  Negative for chills, fever and malaise/fatigue.   HENT:  Negative for hearing loss.    Eyes:  Negative for blurred vision and double vision.   Respiratory:  Negative for cough, shortness of breath and stridor.    Cardiovascular:  Negative for chest pain and leg swelling.   Gastrointestinal:  Negative for constipation, diarrhea and nausea.   Genitourinary:  Negative for frequency and urgency.   Musculoskeletal:  Negative for falls.   Skin:  Negative for itching and rash.   Neurological:  Positive for tremors. Negative for dizziness, loss of consciousness and weakness.   Psychiatric/Behavioral:  Negative for hallucinations and memory loss.            Past Medical History: The patient  has a past medical history of Anxiety, Hashimoto's thyroiditis, Hypertension, and Neck pain, chronic.    Social History: The patient  reports that she has quit smoking. Her smoking use included cigarettes. She has never been exposed to tobacco smoke.  She has never used smokeless tobacco.    Family History: Their family history includes Tremor in her mother.    Allergies: Metoprolol succinate     Meds:   Current Outpatient Medications on File Prior to Visit   Medication Sig Dispense Refill    buPROPion (WELLBUTRIN XL) 300 MG 24 hr tablet Take 300 mg by mouth daily as needed.      busPIRone (BUSPAR) 15 MG tablet Take 7.5-15 mg by mouth.      butalbital-acetaminophen-caff -40 mg Cap       carvediloL (COREG) 12.5 MG tablet Take 12.5 mg by mouth 2 (two) times daily.      cholecalciferol, vitamin D3, 75 mcg (3,000 unit) Tab Take 1 tablet by mouth once daily.      co-enzyme Q-10 30 mg capsule Take 30 mg by mouth once daily.      ergocalciferol (ERGOCALCIFEROL) 50,000 unit Cap Take 50,000 Units by mouth every 7 days.      famotidine (PEPCID) 20 MG tablet Take 20 mg by mouth nightly.      ibuprofen (ADVIL,MOTRIN) 800 MG tablet Take 800 mg by mouth 2 (two) times daily as needed.      levothyroxine (SYNTHROID) 112 MCG tablet Take 112 mcg by mouth.      liothyronine (CYTOMEL) 5 MCG Tab Take 5 mcg by mouth.      methocarbamoL (ROBAXIN) 750 MG Tab Take 750 mg by mouth.      ondansetron (ZOFRAN-ODT) 4 MG TbDL Dissolve 1 tablet (4 mg total) by mouth every 8 (eight) hours as needed (nausea). 30 tablet 0    oxyCODONE (ROXICODONE) 5 MG immediate release tablet Take 1 tablet (5 mg total) by mouth every 4 (four) hours as needed for Pain. 30 tablet 0    pantoprazole (PROTONIX) 40 MG tablet Take 40 mg by mouth.      predniSONE (DELTASONE) 10 MG tablet Take 10 mg by mouth 2 (two) times daily.      rosuvastatin (CRESTOR) 10 MG tablet Take 10 mg by mouth every evening.      sertraline (ZOLOFT) 100 MG tablet Take 100 mg by mouth every evening.      tiZANidine (ZANAFLEX) 4 MG tablet Take 4 mg by mouth nightly.      [DISCONTINUED] gabapentin (NEURONTIN) 300 MG capsule Take 1 capsule (300 mg total) by mouth every evening. 30 capsule 11     No current facility-administered medications  on file prior to visit.       Exam:  There were no vitals taken for this visit.    Constitutional  Well-developed, well-nourished, appears stated age   Ophthalmoscopic  No papilledema with no hemorrhages or exudates bilaterally   Cardiovascular  Radial pulses 2+ and symmetric, no LE edema bilaterally   Neurological    * Mental status  MOCA =      - Orientation  Oriented to person, place, time, and situation     - Memory   Intact recent and remote     - Attention/concentration  Attentive, vigilant during exam     - Language  Naming & repetition intact, +2-step commands     - Fund of knowledge  Aware of current events     - Executive  Well-organized thoughts     - Other     * Cranial nerves       - CN II  PERRL, visual fields full to confrontation     - CN III, IV, VI  Extraocular movements full, normal pursuits and saccades     - CN V  Sensation V1 - V3 intact     - CN VII  Face strong and symmetric bilaterally     - CN VIII  Hearing intact bilaterally     - CN IX, X  Palate raises midline and symmetric     - CN XI  SCM and trapezius 5/5 bilaterally     - CN XII  Tongue midline   * Motor  Muscle bulk normal, strength 5/5 throughout   * Sensory   Intact to temperature    * Coordination  No dysmetria with finger-to-nose or heel-to-shin   * Gait  See below.   * Deep tendon reflexes  3+ palellae b/l  Hoffmans NEG   * Specialized movement exam  No hypophonic speech, very pleasant, appropriately animated    No facial masking.   No cogwheel rigidity     Mild wide-based gait   No shortened stride length.   No abnormal arm swing.     No postural instability.              Tremor Exam   Arms extended Arms in wing position, fingers almost touching Re-emergent Arms extended wrists extended Intention Resting Kinetic   Left ?++jwrky ? ? ? ?+ ? ?+   Right ?+jerky ? ? ? ? ? ?   Head tremor: No-No   Vocal Tremor on EEEs and AAAs: POS  Leg tremor: POS      Archimedes Spirals   Left ?++   Right ?+  "        _______________________________________  Procedure:  DBS MRI Compatibility INFO  09/29/2023    IPG Model(s) R27710   Serial No. [id number]   Impedance OK   Battery  OK   Pocket Adapter  NO   Battery information: status ok.      DBS info  L VIM can cause dysartrhia    Initial      Monopolar review  Right VIM  Pulse width set at 60; Frequency set at 160  left VIM Monopolar review  C&11 @ 3.5mA no SFX mod tremor control  C&10 @ 3.5mA no SFX mod tremor control  C&9 @ 2.5 fleeting parasthesias mod tremor control BEST  C&8 @ 1.0mA - brief paraesthesias better typing   @2.0mA - brief paraesthesias better typing    Programming  Made interleaving to avoid speech slot for L hand        ================  "Monopolar Review  LEFT  Pulse width set at 60; Frequency set at 160  left VIM Monopolar review  C&3 @3.0 no SFX  C&2 @3.0mA speech slur  C&1 @1.0 tingling R arm   @1.4 speech changed  C&0 @1mA pulling and tingling leg and arm    Activated   C+  60/160  2a - @3.0 no tingling  2b - @3.0 no tingling  2c - @3.0 light tingling    Made 3 groups - avoid deep contacts  FINAL  A omni directional  B  C            Laboratory/Radiological:  - Results:  Admission on 07/11/2023, Discharged on 07/12/2023   Component Date Value Ref Range Status    WBC 07/11/2023 7.92  3.90 - 12.70 K/uL Final    RBC 07/11/2023 4.72  4.00 - 5.40 M/uL Final    Hemoglobin 07/11/2023 13.6  12.0 - 16.0 g/dL Final    Hematocrit 07/11/2023 41.5  37.0 - 48.5 % Final    MCV 07/11/2023 88  82 - 98 fL Final    MCH 07/11/2023 28.8  27.0 - 31.0 pg Final    MCHC 07/11/2023 32.8  32.0 - 36.0 g/dL Final    RDW 07/11/2023 13.2  11.5 - 14.5 % Final    Platelets 07/11/2023 224  150 - 450 K/uL Final    MPV 07/11/2023 11.7  9.2 - 12.9 fL Final    Immature Granulocytes 07/11/2023 0.3  0.0 - 0.5 % Final    Gran # (ANC) 07/11/2023 4.2  1.8 - 7.7 K/uL Final    Immature Grans (Abs) 07/11/2023 0.02  0.00 - 0.04 K/uL Final    Lymph # 07/11/2023 2.6  1.0 - 4.8 K/uL Final    Mono " # 07/11/2023 0.8  0.3 - 1.0 K/uL Final    Eos # 07/11/2023 0.3  0.0 - 0.5 K/uL Final    Baso # 07/11/2023 0.06  0.00 - 0.20 K/uL Final    nRBC 07/11/2023 0  0 /100 WBC Final    Gran % 07/11/2023 53.2  38.0 - 73.0 % Final    Lymph % 07/11/2023 32.4  18.0 - 48.0 % Final    Mono % 07/11/2023 9.6  4.0 - 15.0 % Final    Eosinophil % 07/11/2023 3.7  0.0 - 8.0 % Final    Basophil % 07/11/2023 0.8  0.0 - 1.9 % Final    Differential Method 07/11/2023 Automated   Final    aPTT 07/11/2023 28.2  21.0 - 32.0 sec Final    Prothrombin Time 07/11/2023 11.1  9.0 - 12.5 sec Final    INR 07/11/2023 1.0  0.8 - 1.2 Final    Group & Rh 07/11/2023 A NEG   Final    Indirect Danitza 07/11/2023 NEG   Final    Specimen Outdate 07/11/2023 07/14/2023 23:59   Final         Problem List Items Addressed This Visit          Neuro    Tremor    Current Assessment & Plan     Long[place] ET-like tremor  At times dystonic pulling R hand and possibly neck typical to ET  ETOH responsive  She has a myoclonic tremor which today appears to be from lyrica  Suggested decrease lyrica off after she gets her pain injection    DBS settings  Set 3 settings  A-omni  B-directional 1  C-directional 2    She knows to turn DBS OFF at [facility] and ON in the morning                Zainab Shea MD, MS  [facility]  Department of Neurology  Movement Disorders

## 2023-10-09 ENCOUNTER — PATIENT MESSAGE (OUTPATIENT)
Dept: NEUROLOGY | Facility: CLINIC | Age: 68
End: 2023-10-09
Payer: MEDICARE

## 2023-10-17 ENCOUNTER — OFFICE VISIT (OUTPATIENT)
Dept: NEUROSURGERY | Facility: CLINIC | Age: 68
End: 2023-10-17
Payer: MEDICARE

## 2023-10-17 DIAGNOSIS — M54.16 LUMBAR RADICULOPATHY: Primary | ICD-10-CM

## 2023-10-17 DIAGNOSIS — M54.9 BACK PAIN, UNSPECIFIED BACK LOCATION, UNSPECIFIED BACK PAIN LATERALITY, UNSPECIFIED CHRONICITY: ICD-10-CM

## 2023-10-17 PROCEDURE — 99213 PR OFFICE/OUTPT VISIT, EST, LEVL III, 20-29 MIN: ICD-10-PCS | Mod: 95,,, | Performed by: NURSE PRACTITIONER

## 2023-10-17 PROCEDURE — 99213 OFFICE O/P EST LOW 20 MIN: CPT | Mod: 95,,, | Performed by: NURSE PRACTITIONER

## 2023-10-25 NOTE — PROGRESS NOTES
Neurosurgery  Established Patient    SUBJECTIVE:   Established Patient - Telehealth Visit     The patient location is: Work  The chief complaint leading to consultation is: Back Pain  Visit type: Virtual visit with audio and video  Total time spent with patient: 10 minutes     Each patient to whom I provide medical services by telemedicine is:  (1) informed of the relationship between the physician and patient and the respective role of any other health care provider with respect to management of the patient; and (2) notified that they may decline to receive medical services by telemedicine and may withdraw from such care at any time. Patient verbally consented to receive this service via voice-only telephone call.     This service was not originating from a related E/M service provided within the previous 7 days nor will  to an E/M service or procedure within the next 24 hours or my soonest available appointment.  Prevailing standard of care was able to be met in this audio-only visit.       History of Present Illness: Anna Wagoner is a 68 y.o. female being seen virtually today to discuss concerns with persistent low back pain for about 2-3 months. Denies trauma. The patient is known to neurosurgery as she is followed by Dr. Kraft for tremors and DBS. Regarding her back pain, she describes the pain as aching across the low back with radiation into the hip and anterior thigh. Aggravating factors include standing, walking, and bending. Alleviating factors include Celebrex. Denies weakness, numbness or tingling, b/b dysfunction, saddle anesthesia, or gait instability.  She did go to PT for two visits and on the second visit, she had to get help to walk out of there.     Review of patient's allergies indicates:   Allergen Reactions    Metoprolol succinate Hives and Itching     Hives - wheals       Current Outpatient Medications   Medication Sig Dispense Refill    buPROPion (WELLBUTRIN XL) 300 MG 24 hr  tablet Take 300 mg by mouth daily as needed.      busPIRone (BUSPAR) 15 MG tablet Take 7.5-15 mg by mouth.      butalbital-acetaminophen-caff -40 mg Cap       carvediloL (COREG) 12.5 MG tablet Take 12.5 mg by mouth 2 (two) times daily.      cholecalciferol, vitamin D3, 75 mcg (3,000 unit) Tab Take 1 tablet by mouth once daily.      co-enzyme Q-10 30 mg capsule Take 30 mg by mouth once daily.      ergocalciferol (ERGOCALCIFEROL) 50,000 unit Cap Take 50,000 Units by mouth every 7 days.      famotidine (PEPCID) 20 MG tablet Take 20 mg by mouth nightly.      ibuprofen (ADVIL,MOTRIN) 800 MG tablet Take 800 mg by mouth 2 (two) times daily as needed.      levothyroxine (SYNTHROID) 112 MCG tablet Take 112 mcg by mouth.      liothyronine (CYTOMEL) 5 MCG Tab Take 5 mcg by mouth.      methocarbamoL (ROBAXIN) 750 MG Tab Take 750 mg by mouth.      ondansetron (ZOFRAN-ODT) 4 MG TbDL Dissolve 1 tablet (4 mg total) by mouth every 8 (eight) hours as needed (nausea). 30 tablet 0    oxyCODONE (ROXICODONE) 5 MG immediate release tablet Take 1 tablet (5 mg total) by mouth every 4 (four) hours as needed for Pain. 30 tablet 0    pantoprazole (PROTONIX) 40 MG tablet Take 40 mg by mouth.      predniSONE (DELTASONE) 10 MG tablet Take 10 mg by mouth 2 (two) times daily.      rosuvastatin (CRESTOR) 10 MG tablet Take 10 mg by mouth every evening.      sertraline (ZOLOFT) 100 MG tablet Take 100 mg by mouth every evening.      tiZANidine (ZANAFLEX) 4 MG tablet Take 4 mg by mouth nightly.       No current facility-administered medications for this visit.       Past Medical History:   Diagnosis Date    Anxiety     Hashimoto's thyroiditis     Hypertension     Neck pain, chronic      Past Surgical History:   Procedure Laterality Date    BREAST SURGERY      CHOLECYSTECTOMY      DEEP BRAIN STIMULATOR PLACEMENT Left 4/18/2023    Procedure: INSERTION, DEEP BRAIN STIMULATOR;  Surgeon: Bonifacio Kraft MD;  Location: [facility] OR [address] [place] [address];  Service:  Neurosurgery;  Laterality: Left;  INSERTION ELECTRODES FOR DEEP BRAIN STIMULATION/ BILATERAL VENTRALIS INTERMEDIUS/ LEFT SIDE FIRST, RIGHT SIDE SECOND/ TEDS & SCD/ [facility], SACHI MENCHACA.    DEEP BRAIN STIMULATOR PLACEMENT Right 7/11/2023    Procedure: INSERTION, DEEP BRAIN STIMULATOR;  Surgeon: Bonifacio Kraft MD;  Location: [facility] OR 2ND FLR;  Service: Neurosurgery;  Laterality: Right;  INSERT ELECTRODE FOR DEEP BRAIN STIMULATION/RIGHT VENTRALIS INTERMEDIUS/ TEDS & SCD/[facility], PAPITO MORALES.    HYSTERECTOMY      INSERTION OF DEEP BRAIN STIMULATOR GENERATOR Left 4/25/2023    Procedure: INSERTION, PULSE GENERATOR, DEEP BRAIN STIMULATOR;  Surgeon: Bonifacio Kraft MD;  Location: [facility] OR 2ND FLR;  Service: Neurosurgery;  Laterality: Left;  INSERT PULSE GENERATOR FOR DEEP BRAIN STIMULATION/TEDS & SCD/ [facility], SACHI MENCHACA.    PLACEMENT OF FIDUCIAL SCREW INTO SPINE N/A 4/18/2023    Procedure: INSERTION, FIDUCIAL SCREW, SKULL/DBS;  Surgeon: Bonifacio Kraft MD;  Location: [facility] OR 2ND FLR;  Service: Neurosurgery;  Laterality: N/A;  APPLICATION OF FIDUCIALS FOR DEEP BRAIN STIMULATION/ TEDS & SCD/ [facility], SACHI MENCHACA.    PLACEMENT OF FIDUCIAL SCREW INTO SPINE N/A 7/11/2023    Procedure: INSERTION, FIDUCIAL SCREW, SKULL/DBS;  Surgeon: Bonifacio Kraft MD;  Location: [facility] OR 2ND FLR;  Service: Neurosurgery;  Laterality: N/A;  INSERTION FIDUCIALS FOR DEEP BRAIN STIMULATION/ TEDS & SCD/ [facility], PAPITO MORALES.    TONSILLECTOMY       Family History       Problem Relation (Age of Onset)    Tremor Mother          Social History     Socioeconomic History    Marital status:    Tobacco Use    Smoking status: Former     Types: Cigarettes     Passive exposure: Never    Smokeless tobacco: Never       Review of Systems    OBJECTIVE:     Vital Signs     There is no height or weight on file to calculate BMI.    Neurosurgery Physical Exam  General: well developed, well nourished, no distress.   Head:  normocephalic  Neurologic: Alert and oriented. Thought content appropriate.  GCS: Motor: 6/Verbal: 5/Eyes: 4 GCS Total: 15  Mental Status: Awake, Alert, Oriented x 4  Language: No aphasia  Speech: No dysarthria  Cranial nerves: CN II-XII grossly intact.   Eyes: EOMI appear grossly intact  Pulmonary: no signs of respiratory distress  Motor Strength:Moves all extremities spontaneously with good tone. BUE and BLE are at least 3/5. No abnormal movements seen.   Finger-to-nose: intact bilaterally   Pronator drift: absent bilaterally     Diagnostic Results:  I have personally reviewed the imaging dated 9/21/23:    MRI lumbar spine shows multilevel degenerative changes most pronounced L3-4 and L5-S1 with mild neural foraminal narrowing. L4-L5 there is a disc bulge and facet arthropathy. Right paracentral disc herniation also noted at this level with mild mass effect upon the descending right-sided nerve roots.    ASSESSMENT/PLAN:   Anna Wagoner is a 68 y.o. female seen virtually today to discuss concerns with persistent low back pain for about 2-3 months. After reviewing the imaging and concerns, the patient reports slight improvement in her pain with the Celebrex. We discussed that injections might also provide her additional pain relief as she was unable to tolerate PT. She was agreeable. I have placed the referral.      I would like the patient to follow-up in clinic as needed. I have encouraged her to contact the clinic with any questions, concerns, or adverse clinical changes. She verbalized understanding.      JILLIAN Medeiros-HELEN  Neurosurgery  [facility].      Note dictated with voice recognition software, please excuse any grammatical errors.

## 2023-11-30 ENCOUNTER — TELEPHONE (OUTPATIENT)
Dept: PAIN MEDICINE | Facility: CLINIC | Age: 68
End: 2023-11-30

## 2023-11-30 ENCOUNTER — OFFICE VISIT (OUTPATIENT)
Dept: PAIN MEDICINE | Facility: CLINIC | Age: 68
End: 2023-11-30
Payer: MEDICARE

## 2023-11-30 ENCOUNTER — HOSPITAL ENCOUNTER (OUTPATIENT)
Dept: RADIOLOGY | Facility: HOSPITAL | Age: 68
Discharge: HOME | End: 2023-11-30
Attending: STUDENT IN AN ORGANIZED HEALTH CARE EDUCATION/TRAINING PROGRAM
Payer: MEDICARE

## 2023-11-30 VITALS
BODY MASS INDEX: 38.27 KG/M2 | HEART RATE: 67 BPM | SYSTOLIC BLOOD PRESSURE: 117 MMHG | DIASTOLIC BLOOD PRESSURE: 63 MMHG | HEIGHT: 63 IN | WEIGHT: 216 LBS

## 2023-11-30 DIAGNOSIS — M47.816 LUMBAR SPONDYLOSIS: Primary | ICD-10-CM

## 2023-11-30 DIAGNOSIS — M54.9 BACK PAIN, UNSPECIFIED BACK LOCATION, UNSPECIFIED BACK PAIN LATERALITY, UNSPECIFIED CHRONICITY: ICD-10-CM

## 2023-11-30 PROCEDURE — 99204 OFFICE O/P NEW MOD 45 MIN: CPT | Mod: S$PBB,,, | Performed by: STUDENT IN AN ORGANIZED HEALTH CARE EDUCATION/TRAINING PROGRAM

## 2023-11-30 PROCEDURE — 99213 OFFICE O/P EST LOW 20 MIN: CPT | Mod: PBBFAC,PN | Performed by: STUDENT IN AN ORGANIZED HEALTH CARE EDUCATION/TRAINING PROGRAM

## 2023-11-30 PROCEDURE — 72100 X-RAY EXAM L-S SPINE 2/3 VWS: CPT | Mod: 26,,, | Performed by: RADIOLOGY

## 2023-11-30 PROCEDURE — 99999 PR PBB SHADOW E&M-EST. PATIENT-LVL III: ICD-10-PCS | Mod: PBBFAC,,, | Performed by: STUDENT IN AN ORGANIZED HEALTH CARE EDUCATION/TRAINING PROGRAM

## 2023-11-30 PROCEDURE — 99204 PR OFFICE/OUTPT VISIT, NEW, LEVL IV, 45-59 MIN: ICD-10-PCS | Mod: S$PBB,,, | Performed by: STUDENT IN AN ORGANIZED HEALTH CARE EDUCATION/TRAINING PROGRAM

## 2023-11-30 PROCEDURE — 99999 PR PBB SHADOW E&M-EST. PATIENT-LVL III: CPT | Mod: PBBFAC,,, | Performed by: STUDENT IN AN ORGANIZED HEALTH CARE EDUCATION/TRAINING PROGRAM

## 2023-11-30 PROCEDURE — 72100 X-RAY EXAM L-S SPINE 2/3 VWS: CPT | Mod: TC

## 2023-11-30 PROCEDURE — 72100 XR LUMBAR SPINE AP AND LATERAL: ICD-10-PCS | Mod: 26,,, | Performed by: RADIOLOGY

## 2023-11-30 NOTE — TELEPHONE ENCOUNTER
Patient Name: JEANNETTE SAMANIEGO([id number])   Sex: Female   : 1955        PCP: STANLEY ANGELES     Center: [facility]       Types of orders made on 2023: Imaging, Outpatient Referral, Procedure                                       Request      Order Date:2023   Ordering User:FABIENNE GAMEZ [[id number]]   Z1    Encounter Provider:Fabienne Gamez MD [[id number]]   Authorizing Provider: Fabienne Gamez MD [[id number]]   Department:[facility] PAIN MANAGEMENT[[id number]]      Common Order Information   Procedure -> Medial Branch Block (Specify level and laterality) Cmt: Bilateral             L4-5 and L5-S1      Order Specific Information   Order: Procedure Order to Pain Management [Custom: [id number]]  Order #:          [id number]: 1 FUTURE     Priority   : Routine  Class: Clinic Performed     Future Order Information       Expires on:2024            Expected by:2023                   Associated Diagnoses       M54.9 Back pain, unspecified back location, unspecified back pain       laterality, unspecified chronicity       Facility Name: -> [facility]              Priority: Routine  Class: Clinic Performed     Future Order Information          s on:2024            Expected by:2023                   Associated Diagnoses       M54.9 Back pain, unspecified back location, unspecified back pain       laterality, unspecified chronicity       Procedure -> Medial Branch Block (Specify level and laterality) Cmt:                 Bilateral L4-5 and L5-S1

## 2023-11-30 NOTE — H&P (VIEW-ONLY)
Office Note    Victor Hugo Evans MD      First Office Visit: 11/30/23  Today' Date: 11/30/2023  Last Office Visit: None    Chief complaint: back pain     HPI: Pt is a pleasent 68 y.o., who presents for evaluation. Referred by Krystal Helton NP. Pt complains of back pain and R leg pain. Back pain is worse than the R leg pain. Pain is worse with walking. Endorses having sharp, shooting pain going down front of R leg and endorses having cramping in her R calf. Was seeing an outside pain clinic where she had an MARIA FERNANDA that lasted a month. Started PT in August and doing HEP.     Pain score: 0  Pain disability score: 35      Relevant Imaging/ Testing:   MR L-spine 9/23  XR C-spine 3/23  CT C-spine 3/23  MR C-spine 2/23  XR T-spine 7/21  XR L-spine 8/20    Procedures: None    Date of board of pharmacy review:11/30/2023  Date of opioid risk screening/ pain psych: None  Date of opioid agreement and consent: None  Date of urine drug screen: None  Date of random pill count: None     was reviewed today: reviewed, no concerns     Prescribed medications: None    See EHR for  PMH, PSH, FH, SH, Medications and Allergy    ROS:  Positive for pain  ROS     PE:  Vitals:    11/30/23 0929   BP: 117/63   Pulse: 67     General: Pleasant, no distress  HEENT: NC/ AT. PERRLA  CV: Radial pulses intact  Pulm: No distress  Ext: No edema    Physical Exam     Neuromusculoskeletal:  Head: NC, AT. PERRLA  Neck: Intact range of motions, extension, flexion, rotation. Neg Facet loading. Neg Spurling. Min Tenderness.  5/5 Strength, normal tone  Shoulder: Intact range of motion  Lumbar: Intact range of motion. Pos Facet loading bilaterally. Min Tenderness. Neg SL   Hip: Intact range of motion  SI: Level, Min tenderness  Knee: Intact range of motion  Reflexes: normal Knee  Strength: 5/5 globally   Sensory: Grossly intact   Skin: No bruising, erythema  Gait: Normal      Impression:  Back pain  Axial back pain   R leg pain  Relevant History  BMI  38.26  Anxiety   RLS  Parkinson's  Vit D deficiency         Plan:  Discussed options  Imaging/ relevant records viewed/ reviewed/ discussed  Imaging results viewed and reviewed (noted above)/ reviewed with patient   reviewed  Cont PT  Cont HEP  XR L-spine  B MBB L4-5 and L5-S1  Re-eval after  Repeat MBB followed by RFA if appropriate        Prescribed medications:  1. None    The impression and plan were discussed and explained in detail. All the questions were answered. Education was provided accordingly.     The procedure was explained in detail, along with risks and potential side effects.        Follow-up:  For procedure     Fabienne Donaldson MD

## 2023-12-05 ENCOUNTER — PATIENT MESSAGE (OUTPATIENT)
Dept: PAIN MEDICINE | Facility: CLINIC | Age: 68
End: 2023-12-05
Payer: MEDICARE

## 2023-12-05 ENCOUNTER — PATIENT MESSAGE (OUTPATIENT)
Dept: NEUROLOGY | Facility: CLINIC | Age: 68
End: 2023-12-05
Payer: MEDICARE

## 2023-12-05 ENCOUNTER — TELEPHONE (OUTPATIENT)
Dept: PAIN MEDICINE | Facility: CLINIC | Age: 68
End: 2023-12-05
Payer: MEDICARE

## 2023-12-12 ENCOUNTER — PATIENT MESSAGE (OUTPATIENT)
Dept: PAIN MEDICINE | Facility: CLINIC | Age: 68
End: 2023-12-12
Payer: MEDICARE

## 2023-12-18 ENCOUNTER — HOSPITAL ENCOUNTER (OUTPATIENT)
Facility: HOSPITAL | Age: 68
Discharge: HOME | End: 2023-12-18
Attending: STUDENT IN AN ORGANIZED HEALTH CARE EDUCATION/TRAINING PROGRAM | Admitting: STUDENT IN AN ORGANIZED HEALTH CARE EDUCATION/TRAINING PROGRAM
Payer: MEDICARE

## 2023-12-18 DIAGNOSIS — M47.896 OTHER SPONDYLOSIS, LUMBAR REGION: ICD-10-CM

## 2023-12-18 DIAGNOSIS — M47.817 SPONDYLOSIS OF LUMBOSACRAL REGION WITHOUT MYELOPATHY OR RADICULOPATHY: Primary | ICD-10-CM

## 2023-12-18 PROCEDURE — 63600175 PHARM REV CODE 636 W HCPCS: Performed by: STUDENT IN AN ORGANIZED HEALTH CARE EDUCATION/TRAINING PROGRAM

## 2023-12-18 PROCEDURE — 64493 INJ PARAVERT F JNT L/S 1 LEV: CPT | Mod: 50,,, | Performed by: STUDENT IN AN ORGANIZED HEALTH CARE EDUCATION/TRAINING PROGRAM

## 2023-12-18 PROCEDURE — 64493 INJ PARAVERT F JNT L/S 1 LEV: CPT | Mod: 50 | Performed by: STUDENT IN AN ORGANIZED HEALTH CARE EDUCATION/TRAINING PROGRAM

## 2023-12-18 PROCEDURE — 64493 PR INJ DX/THER AGNT PARAVERT FACET JOINT,IMG GUIDE,LUMBAR/SAC,1ST LVL: ICD-10-PCS | Mod: 50,,, | Performed by: STUDENT IN AN ORGANIZED HEALTH CARE EDUCATION/TRAINING PROGRAM

## 2023-12-18 PROCEDURE — 64494 INJ PARAVERT F JNT L/S 2 LEV: CPT | Mod: 50 | Performed by: STUDENT IN AN ORGANIZED HEALTH CARE EDUCATION/TRAINING PROGRAM

## 2023-12-18 PROCEDURE — 25000003 PHARM REV CODE 250: Performed by: STUDENT IN AN ORGANIZED HEALTH CARE EDUCATION/TRAINING PROGRAM

## 2023-12-18 PROCEDURE — 64494 PR INJ DX/THER AGNT PARAVERT FACET JOINT,IMG GUIDE,LUMBAR/SAC, 2ND LEVEL: ICD-10-PCS | Mod: 50,,, | Performed by: STUDENT IN AN ORGANIZED HEALTH CARE EDUCATION/TRAINING PROGRAM

## 2023-12-18 PROCEDURE — 64494 INJ PARAVERT F JNT L/S 2 LEV: CPT | Mod: 50,,, | Performed by: STUDENT IN AN ORGANIZED HEALTH CARE EDUCATION/TRAINING PROGRAM

## 2023-12-18 RX ORDER — SODIUM CHLORIDE, SODIUM LACTATE, POTASSIUM CHLORIDE, CALCIUM CHLORIDE 600; 310; 30; 20 MG/100ML; MG/100ML; MG/100ML; MG/100ML
INJECTION, SOLUTION INTRAVENOUS CONTINUOUS
Status: DISCONTINUED | OUTPATIENT
Start: 2023-12-18 | End: 2023-12-18 | Stop reason: HOSPADM

## 2023-12-18 RX ORDER — LIDOCAINE HYDROCHLORIDE 10 MG/ML
1 INJECTION, SOLUTION EPIDURAL; INFILTRATION; INTRACAUDAL; PERINEURAL ONCE
Status: DISCONTINUED | OUTPATIENT
Start: 2023-12-18 | End: 2023-12-18 | Stop reason: HOSPADM

## 2023-12-18 RX ORDER — LIDOCAINE HYDROCHLORIDE 10 MG/ML
INJECTION, SOLUTION EPIDURAL; INFILTRATION; INTRACAUDAL; PERINEURAL
Status: DISCONTINUED | OUTPATIENT
Start: 2023-12-18 | End: 2023-12-18 | Stop reason: HOSPADM

## 2023-12-18 RX ORDER — BUPIVACAINE HYDROCHLORIDE 2.5 MG/ML
INJECTION, SOLUTION EPIDURAL; INFILTRATION; INTRACAUDAL
Status: DISCONTINUED | OUTPATIENT
Start: 2023-12-18 | End: 2023-12-18 | Stop reason: HOSPADM

## 2023-12-18 NOTE — OP NOTE
Patient: Anna Wagoner                                                    MRN: [id number]  : 1955                                              Date of procedure: 2023        Pre Procedure Diagnosis: Back pain, Lumbar spondylosis without myelopathy/ lumbosacral region    Post Procedure Diagnosis: Same    Procedure: Bilateral Lumbar Medial Branch Nerve Block for L4-5 and L5-S1 joints under Fluoroscopy     Attending: Fabienne Donaldson M.D.    Local Anesthetic Injected: 1% Lidocaine 10 ml    Sedation Medications: None    Estimated Blood Loss: None    Complication: None        Procedure:  After informed consent was obtained, patient was taken to the fluoroscopy suite and placed in a prone position.  The skin was prepped and draped in the usual sterile fashion using chlorhexidine.  Anatomical landmarks were identified with the aid of fluoroscopy in PA and Oblique views, and the skin and subcutaneous tissue were infiltrated with a total of 10mL of 1% Lidocaine via a 25-gauge 1.5inch needle at each of the intended entry sites.  Subsequently, three 22-gauge 3.5inch needles were advanced with the aid of fluoroscopy such that their tips were located at the respective positions of the superior medial border of the transverse processes with the posterior elements at each level.  Needle placement was confirmed with the aid of fluoroscopy.  After negative aspiration, 0.5mL of 0.25% Bupivicaine was injected at each level.  This was repeated on the other side. There were no complications or paresthesias.   Patient tolerated the procedure well and all needles were removed intact.    Patient was observed in recovery and discharged home with written instruction under supervision in stable condition.

## 2023-12-18 NOTE — DISCHARGE SUMMARY
[facility] - Periop Services  Discharge Note  Short Stay    Procedure(s) (LRB):  Block-nerve-medial branch-lumbar (Bilateral)      OUTCOME: Patient tolerated treatment/procedure well without complication and is now ready for discharge.    DISPOSITION: Home or Self Care    FINAL DIAGNOSIS:  <principal problem not specified>    FOLLOWUP: In clinic    DISCHARGE INSTRUCTIONS:    Discharge Procedure Orders   Notify your health care provider if you experience any of the following:  temperature >100.4     Notify your health care provider if you experience any of the following:  severe uncontrolled pain     Notify your health care provider if you experience any of the following:  redness, tenderness, or signs of infection (pain, swelling, redness, odor or green/yellow discharge around incision site)     Activity as tolerated        TIME SPENT ON DISCHARGE: 20 minutes

## 2023-12-18 NOTE — PLAN OF CARE
Discharge instructions given to pt, verbalized understanding.  Tolerating fluids.  Denies pain.  Ambulating out to self care in no distress.

## 2023-12-19 ENCOUNTER — TELEPHONE (OUTPATIENT)
Dept: PAIN MEDICINE | Facility: CLINIC | Age: 68
End: 2023-12-19
Payer: MEDICARE

## 2023-12-19 VITALS
OXYGEN SATURATION: 96 % | HEART RATE: 75 BPM | RESPIRATION RATE: 18 BRPM | BODY MASS INDEX: 38.27 KG/M2 | WEIGHT: 216 LBS | TEMPERATURE: 98 F | HEIGHT: 63 IN | DIASTOLIC BLOOD PRESSURE: 63 MMHG | SYSTOLIC BLOOD PRESSURE: 131 MMHG

## 2023-12-19 DIAGNOSIS — M47.816 LUMBAR SPONDYLOSIS: Primary | ICD-10-CM

## 2023-12-19 NOTE — TELEPHONE ENCOUNTER
Scheduled for mbb #2 01/22      Pt had 90% relief x 2 hours   Her starting pain score was a 5 and after procedure her pain score was 1

## 2024-01-11 ENCOUNTER — OFFICE VISIT (OUTPATIENT)
Dept: NEUROLOGY | Facility: CLINIC | Age: 69
End: 2024-01-11
Payer: MEDICARE

## 2024-01-11 ENCOUNTER — PATIENT MESSAGE (OUTPATIENT)
Dept: NEUROLOGY | Facility: CLINIC | Age: 69
End: 2024-01-11

## 2024-01-11 VITALS
SYSTOLIC BLOOD PRESSURE: 129 MMHG | BODY MASS INDEX: 38.1 KG/M2 | HEART RATE: 55 BPM | WEIGHT: 215.06 LBS | DIASTOLIC BLOOD PRESSURE: 72 MMHG

## 2024-01-11 DIAGNOSIS — G25.0 ESSENTIAL TREMOR: Primary | ICD-10-CM

## 2024-01-11 PROBLEM — G20.A1 PD (PARKINSON'S DISEASE): Status: RESOLVED | Noted: 2023-07-12 | Resolved: 2024-01-11

## 2024-01-11 PROCEDURE — 95983 ALYS BRN NPGT PRGRMG 15 MIN: CPT | Mod: PBBFAC | Performed by: PSYCHIATRY & NEUROLOGY

## 2024-01-11 PROCEDURE — 95983 ALYS BRN NPGT PRGRMG 15 MIN: CPT | Mod: S$PBB,,, | Performed by: PSYCHIATRY & NEUROLOGY

## 2024-01-11 PROCEDURE — 99999 PR PBB SHADOW E&M-EST. PATIENT-LVL IV: CPT | Mod: PBBFAC,,, | Performed by: PSYCHIATRY & NEUROLOGY

## 2024-01-11 PROCEDURE — 95984 ALYS BRN NPGT PRGRMG ADDL 15: CPT | Mod: S$PBB,,, | Performed by: PSYCHIATRY & NEUROLOGY

## 2024-01-11 PROCEDURE — 99214 OFFICE O/P EST MOD 30 MIN: CPT | Mod: PBBFAC,25 | Performed by: PSYCHIATRY & NEUROLOGY

## 2024-01-11 PROCEDURE — 99215 OFFICE O/P EST HI 40 MIN: CPT | Mod: 25,S$PBB,, | Performed by: PSYCHIATRY & NEUROLOGY

## 2024-01-11 PROCEDURE — 95984 ALYS BRN NPGT PRGRMG ADDL 15: CPT | Mod: PBBFAC | Performed by: PSYCHIATRY & NEUROLOGY

## 2024-01-11 NOTE — PATIENT INSTRUCTIONS
Deep Brain Stimulation Medical Safety Issues - or things to know!     EKG / ECG  (cardiac rhythm checks) - If your doctor wants to do an EKG you should turn the DBS off so that it will not cause interference in your doctor interpreting the EKG tracing.      Surgery or procedures    - For surgical procedures you should turn the DBS off as well.  We can provide a letter to your doctor regarding surgical procedures and your DBS device if needed.    -For implanting new medical devices, please call Neurosurgery first.     - Electrocautery or Plasma Blade (used to stop bleeding in surgeries) - DBS needs to be turned off.  The surgeon needs to be aware regarding guidelines. Ideally, only bipolar electrocautery should be used. If needed, the grounding pad can be placed on the lower extremities.     - EEG (electroencephalogram) tracing.  Turn off DBS until monitoring is completed     - No lithotripsy (treatment for kidney stones). This may damage the neurostimulator circuitry. If lithotripsy must be used, do not focus the beam within 15 cm (6 in) of the neurostimulator     - Cardioversion - turn the device off. Some older devices need to be turned down to 0V.     - For Colonoscopy: Provider will follow electrocautery guidelines. The DBS may need to be turned off during the procedure to allow for monitoring of EKG.     - For Cataract surgery: Cataract surgery is considered safe and your DBS device can remain on.  If the ophthalmologist uses phaecoemulsification (ultrasound, most common), they should work from the top of the head down. The surgeon should not lay anything across the chest  If they use laser, the surgeon should use caution to not direct anything towards the DBS equipment.       Dentistry: - Dental x-rays and Panoramic dental x-rays are okay     - You may use electric toothbrushes  - Prophylactic Antibiotics: As of 2012, the American Dental Association (ADA) and the American Association of Orthopedic Surgeons  no longer recommend antibiotic prophylaxis for everyone with orthopedic implants. Based on these guidelines, we no longer recommend antibiotics for everyone with DBS. However, your dentist may rely more on your personal medical history to determine when antibiotics are appropriate.      Radiology - CT Scan and x-rays are okay        - Regular ultrasounds are okay. For example: ultrasounds of the heart, kidney etc.     - For mammograms, the radiology technician should be experienced with doing mammograms on cardiac pacemakers as the devices are similar. The pads should be positioned/ rotated so as not to pull/place pressure on the device or lead extenders (wires)     - DEXA scans okay      Radiation therapy Radiation therapy should not be directed at the neurostimulator.  High-radiation exposure may temporarily interfere with neurostimulator operation and may damage the neurostimulator.  Damage may not be immediately apparent.  To limit device exposure, use appropriate shielding or other measures, such as making beam angle adjustments to avoid the device.        MRIs - depends on company and type of battery.  Ask your DBS provider     - For all companies and batteries the imaging center has to be certified to do imaging in DBS patients. [facility] is certified.     - Medtronic Soletra / Kinetra - brain-only conditional, impedance within range, 1.5T only, DBS set to 0 volts and off. Cannot do MRI impedance check with patient remote - has to be done by DBS provider or Med[employer] representative.     - Medtronic Activa SC, Activa PC, Activa RC - Full-body conditional, impedance within range, 1.5T only, DBS set to off if monopolar setting and either side, DBS set on if bipolar setting on both sides. Cannot do MRI impedance check with patient remote - has to be done by DBS provider or Med[employer] representative.     - Medtronic Percept PC - full-body conditional, impedance within range, 1.5T or  3T, DBS set to off if monopolar setting and either side, DBS set on if bipolar setting on both sides. Can do MRI check with patient remote or be done by DBS provider or Med[employer] representative.      - [employer] Vercise, Vercise PC  - not FDA-approved of MRI brain.  Brain only with special consent.  1.5T with DBS 0 mA and off. Cannot do MRI check with patient remote - has to be done by DBS provider.     - [employer] Vercise Gevia  - brain-only conditional, impedance within range (has to be done by DBS provider or [employer] representative), 1.5T only, DBS set to MRI mode by DBS provider or [employer] representative.       - [employer] Vercise Genus  - brain-only conditional, impedance within range (can be checked with patient remote or done by DBS provider or [employer] representative), 1.5T only, DBS set to MRI mode by patient remote       What to avoid    - No Diathermy (heated ultrasound or therapeutic ultrasound) Physical therapist or chiropractors use diathermy. Can cause neurostimulation system or tissue damage and can result in severe injury or death     -No Welding    -No High-Voltage electrical devices    - No Transcranial Magnetic Stimulation (TMS)    -DO NOT assist linemen incase of a power outage     - If you work on a worksite with electrical equipment, wear rubber-soled shoes to avoid electrocution risk and damage to your DBS equipment and or your brain    - Patients should not scuba dive below 10 meters (33 feet) of water or enter hyperbaric chambers above 2.0 atmospheres absolute (JODIE) as this could damage the neurostimulation system, before diving or using a hyperbaric chamber, patients should discuss the effects of high pressure with their clinician.       Precautions: -  Loss of coordination in activities such as swimming may occur. Have a swim krystal.      Airport security, concerts, courthouses, museums: -  Bring your DBS identification card to the  airport with you to present to security personnel prior to being screened. Tell the TSA agent you have a brain pacemaker in place.     -  Security devices will NOT harm your DBS; however, they may cause stimulation to switch ON or OFF and some patients may experience a momentary increase in perceived stimulation when passing through theft detectors and security screening devices.     -  Security personnel at most airports will conduct a pat-down security check and or use a wand instead of requiring the DBS patient to go through the security gate.     -  Always bring your patient  with you when you travel.      Mental Health: -  Depression, suicidal ideations and suicide have been reported in patients receiving DBS Therapy for Movement Disorders, although no direct cause and effect relationship has been established. This has been reported around 6 weeks post-surgery. Please contact your DBS provider immediately if you note any change in your mental health.      Physical Activities: -  After incisions have completely healed, it is ok to perform all physical activities, except for those that may result in repeated hard blows to the device, e.g., American football, ice hockey, and boxing. Wear helmets for the activities you would normally have a helmet for, e.g., bicycling, skiing, snowboarding, and horseback riding     -  If you have Parkinson's or essential tremor - both diagnoses can cause problems with balance.  Because of this symptom we recommended avoiding the use of ladders.     Reminder: This is an implanted device, meaning it is made of metal and plastic rather than your body's own cells. If you get an infection elsewhere in your body, and that infection gets into your bloodstream, it can travel to the device and infect it, requiring device removal. It is very important to treat infection quickly to prevent device infection.    Disclaimer: This is a partial list of safety concerns. Please review  the company [employer] website for a more complete list of safety concerns:

## 2024-01-11 NOTE — ASSESSMENT & PLAN NOTE
Longrobbieanding ET-like tremor D/L DBS VIM  At times dystonic pulling R hand and possibly neck typical to ET  ETOH responsive    L Vim can cause dysarthria  Changed Groups to avoid stimulating this site  A, C, D all new  B is old    Good tremor control     She knows to turn DBS OFF at night and ON in the morning

## 2024-01-11 NOTE — PROGRESS NOTES
Movement Disorders -   Name: Anna Wagoner  MRN: [id number]   CSN: [id number]      Date: 01/11/2024    Referring physician:  No referring provider defined for this encounter.    Chief Complaint: tremor     Interval Hx  B.L VIM DBS    Switched between several groups  At times C affects speech  Continues to turn DBS OFF at night  Stopped lyrica      Previous  Interval Hx  Since last visit with RR, comes for evaluation for DBS  R handed woman.  Tried the CHERI trio which did not help.  He reports a b/l hand tremor since 9-13yo slowly progressive over her lifetime L>R in the last few years. Started to have a vocal tremor and head tremor in the last years. At this point she can no longer put on makeup, cannot write. At times drops items. Struggles to type and double types.    Since several year her R hand may cramp and fingers draw up.  At times feet cramp.  Chronic neck strain. Mentions she hs spinal stenosis but no record of MRI documenting.  Takes zanaflex 8mg QHS.  Had hallucinations on propranolol    Reports had a DATscan 2021 - [place]    At times unsteady gait. Falls once a year.  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 Some word-finding and repeating asking for tasks.    Son has a tremor  Otherwise no fam hx tremor    ----------------  Prior  Interval History:  - cheri-trio 60 day trial -- did not notice any improvement with the tremor  - L hand is a little worse, some change in direction   - notices it when holding things   - drops things with her R hand   - now off of phentermine   - feels that tremors slowed down with phentermine and even more with the increased dose   - drinks one cup of coffee in the morning    - tremor improves with etoh   - chronic headaches/migraines -- interested in seeing headache specialist here         From July 2022: Anna Wagoner is a R HANDED 68 y.o. female with a medical issues significant for HTN, HLD, hypothyroidism (hashimotos), and anxiety who  presents for tremors. Referral from [facility]. Accompanied by daughter. Previously on primidone which she felt caused weight gain and constipation. No longer on it. She tried gabapentin and topamax in the past but stop due to cognitive side effects.  Propranolol caused nightmares and hallucinations?   She has had the tremors since she was at least 11 or 12 years old. Worse on the L hand now, feels that the tremor is progressing. Notices tremor whenever she is typing as well as whenever she is holding something. It does affect her handwriting. Tremor improves with EtOH. She is in a facebook ET group. She works full-time in [email or web address]. Plans to work for 5 more years.   Primidone helped the tremor but after a few weeks didn't find it as effective. Max dose was 100 mg BID. Felt that she was gaining weight on primidone. Does not think that this was because of thyroid issues. Per chart review, clonazepam caused daytime lethargy but she does not recall this. She asks about medications that she can take during the day.   Very sensitive to side effects of medications.   Currently taking phentermine right now for weight loss. Learning how to eat different. Years ago she tried topamax but caused cognitive side effects.    Not interested in DBS or focused ultrasound.   Asks about BPPV, sees ENT in [place].       From outside neurology provider note  67 y.o. female presents today for neurological follow up. Last seen in follow up with Dr. Desai in January 2022, when primidone was prescribed given findings most consistent with essential tremor. She does have an essential tremor (both limbs and voice) which she has had since about age 11. Tremor more noticeable in left upper extremity than right. Requests referral to movement specialist. Scheduled to see weight-loss specialist given weight gain she attributes to Primidone.   Neurocognitive status: Neurocognitive function has remained stable.   Current pertinent  medications:Primidone triggered weight gain and constipation, so she has discontinued. She tried gabapentin and topamax, but discontinued due to cognitive side effects. Klonopin prn triggered daytime lethargy. Propranolol triggered nightmares and hallucinations.         Family History: none     Neuroleptic Exposure: none        Review of Systems:   Review of Systems   Constitutional:  Negative for chills, fever and malaise/fatigue.   HENT:  Negative for hearing loss.    Eyes:  Negative for blurred vision and double vision.   Respiratory:  Negative for cough, shortness of breath and stridor.    Cardiovascular:  Negative for chest pain and leg swelling.   Gastrointestinal:  Negative for constipation, diarrhea and nausea.   Genitourinary:  Negative for frequency and urgency.   Musculoskeletal:  Negative for falls.   Skin:  Negative for itching and rash.   Neurological:  Positive for tremors. Negative for dizziness, loss of consciousness and weakness.   Psychiatric/Behavioral:  Negative for hallucinations and memory loss.            Past Medical History: The patient  has a past medical history of Anxiety, Back pain, Hashimoto's thyroiditis, Hypertension, and Neck pain, chronic.    Social History: The patient  reports that she has quit smoking. Her smoking use included cigarettes. She has never been exposed to tobacco smoke. She has never used smokeless tobacco.    Family History: Their family history includes Tremor in her mother.    Allergies: Metoprolol succinate     Meds:   Current Outpatient Medications on File Prior to Visit   Medication Sig Dispense Refill    buPROPion (WELLBUTRIN XL) 300 MG 24 hr tablet Take 300 mg by mouth daily as needed.      busPIRone (BUSPAR) 15 MG tablet Take 7.5-15 mg by mouth.      butalbital-acetaminophen-caff -40 mg Cap       carvediloL (COREG) 12.5 MG tablet Take 12.5 mg by mouth 2 (two) times daily.      cholecalciferol, vitamin D3, 75 mcg (3,000 unit) Tab Take 1 tablet by mouth  once daily.      co-enzyme Q-10 30 mg capsule Take 30 mg by mouth once daily.      ergocalciferol (ERGOCALCIFEROL) 50,000 unit Cap Take 50,000 Units by mouth every 7 days.      famotidine (PEPCID) 20 MG tablet Take 20 mg by mouth nightly.      ibuprofen (ADVIL,MOTRIN) 800 MG tablet Take 800 mg by mouth 2 (two) times daily as needed.      liothyronine (CYTOMEL) 5 MCG Tab Take 5 mcg by mouth.      methocarbamoL (ROBAXIN) 750 MG Tab Take 750 mg by mouth.      ondansetron (ZOFRAN-ODT) 4 MG TbDL Dissolve 1 tablet (4 mg total) by mouth every 8 (eight) hours as needed (nausea). 30 tablet 0    pantoprazole (PROTONIX) 40 MG tablet Take 40 mg by mouth.      rosuvastatin (CRESTOR) 10 MG tablet Take 10 mg by mouth every evening.      sertraline (ZOLOFT) 100 MG tablet Take 100 mg by mouth every evening.      tiZANidine (ZANAFLEX) 4 MG tablet Take 4 mg by mouth nightly.      levothyroxine (SYNTHROID) 112 MCG tablet Take 112 mcg by mouth.      oxyCODONE (ROXICODONE) 5 MG immediate release tablet Take 1 tablet (5 mg total) by mouth every 4 (four) hours as needed for Pain. 30 tablet 0    predniSONE (DELTASONE) 10 MG tablet Take 10 mg by mouth 2 (two) times daily.       No current facility-administered medications on file prior to visit.       Exam:  /72 (BP Location: Right arm, Patient Position: Sitting, BP Method: Medium (Automatic))   Pulse (!) 55   Wt 97.6 kg (215 lb 0.9 oz)   BMI 38.10 kg/m²     Constitutional  Well-developed, well-nourished, appears stated age   Ophthalmoscopic  No papilledema with no hemorrhages or exudates bilaterally   Cardiovascular  Radial pulses 2+ and symmetric, no LE edema bilaterally   Neurological    * Mental status  MOCA =      - Orientation  Oriented to person, place, time, and situation     - Memory   Intact recent and remote     - Attention/concentration  Attentive, vigilant during exam     - Language  Naming & repetition intact, +2-step commands     - Fund of knowledge  Aware of current  events     - Executive  Well-organized thoughts     - Other     * Cranial nerves       - CN II  PERRL, visual fields full to confrontation     - CN III, IV, VI  Extraocular movements full, normal pursuits and saccades     - CN V  Sensation V1 - V3 intact     - CN VII  Face strong and symmetric bilaterally     - CN VIII  Hearing intact bilaterally     - CN IX, X  Palate raises midline and symmetric     - CN XI  SCM and trapezius 5/5 bilaterally     - CN XII  Tongue midline   * Motor  Muscle bulk normal, strength 5/5 throughout   * Sensory   Intact to temperature    * Coordination  No dysmetria with finger-to-nose or heel-to-shin   * Gait  See below.   * Deep tendon reflexes  3+ palellae b/l  Hoffmans NEG   * Specialized movement exam  No hypophonic speech, very pleasant, appropriately animated    No facial masking.   No cogwheel rigidity     Mild wide-based gait   No shortened stride length.   No abnormal arm swing.     No postural instability.              Tremor Exam   Arms extended Arms in wing position, fingers almost touching Re-emergent Arms extended wrists extended Intention Resting Kinetic   Left ?++jwrky ? ? ? ?+ ? ?+   Right ?+jerky ? ? ? ? ? ?   Head tremor: No-No   Vocal Tremor on EEEs and AAAs: POS  Leg tremor: POS      Archimedes Spirals   Left ?++   Right ?+         _______________________________________  Procedure:  DBS MRI Compatibility INFO  01/11/2024    IPG Model(s) M42477   Serial No. [id number]   Impedance OK   Battery  OK   Pocket Adapter  NO     DBS Notes  L VIM can cause dysartrhia 1a and c especially    Initial        Programming  Made A, C, D    FINAL          -------------------  Initial      Monopolar review  Right VIM  Pulse width set at 60; Frequency set at 160  left VIM Monopolar review  C&11 @ 3.5mA no SFX mod tremor control  C&10 @ 3.5mA no SFX mod tremor control  C&9 @ 2.5 fleeting parasthesias mod tremor control BEST  C&8 @ 1.0mA - brief paraesthesias better typing   @2.0mA -  "brief paraesthesias better typing    Programming  Made interleaving to avoid speech slot for L hand        ================  "Monopolar Review  LEFT  Pulse width set at 60; Frequency set at 160  left VIM Monopolar review  C&3 @3.0 no SFX  C&2 @3.0mA speech slur  C&1 @1.0 tingling R arm   @1.4 speech changed  C&0 @1mA pulling and tingling leg and arm    Activated   C+  60/160  2a - @3.0 no tingling  2b - @3.0 no tingling  2c - @3.0 light tingling    Made 3 groups - avoid deep contacts  FINAL  A omni directional  B  C            Laboratory/Radiological:  - Results:  No visits with results within 3 Month(s) from this visit.   Latest known visit with results is:   Admission on 07/11/2023, Discharged on 07/12/2023   Component Date Value Ref Range Status    WBC 07/11/2023 7.92  3.90 - 12.70 K/uL Final    RBC 07/11/2023 4.72  4.00 - 5.40 M/uL Final    Hemoglobin 07/11/2023 13.6  12.0 - 16.0 g/dL Final    Hematocrit 07/11/2023 41.5  37.0 - 48.5 % Final    MCV 07/11/2023 88  82 - 98 fL Final    MCH 07/11/2023 28.8  27.0 - 31.0 pg Final    MCHC 07/11/2023 32.8  32.0 - 36.0 g/dL Final    RDW 07/11/2023 13.2  11.5 - 14.5 % Final    Platelets 07/11/2023 224  150 - 450 K/uL Final    MPV 07/11/2023 11.7  9.2 - 12.9 fL Final    Immature Granulocytes 07/11/2023 0.3  0.0 - 0.5 % Final    Gran # (ANC) 07/11/2023 4.2  1.8 - 7.7 K/uL Final    Immature Grans (Abs) 07/11/2023 0.02  0.00 - 0.04 K/uL Final    Lymph # 07/11/2023 2.6  1.0 - 4.8 K/uL Final    Mono # 07/11/2023 0.8  0.3 - 1.0 K/uL Final    Eos # 07/11/2023 0.3  0.0 - 0.5 K/uL Final    Baso # 07/11/2023 0.06  0.00 - 0.20 K/uL Final    nRBC 07/11/2023 0  0 /100 WBC Final    Gran % 07/11/2023 53.2  38.0 - 73.0 % Final    Lymph % 07/11/2023 32.4  18.0 - 48.0 % Final    Mono % 07/11/2023 9.6  4.0 - 15.0 % Final    Eosinophil % 07/11/2023 3.7  0.0 - 8.0 % Final    Basophil % 07/11/2023 0.8  0.0 - 1.9 % Final    Differential Method 07/11/2023 Automated   Final    aPTT 07/11/2023 28.2  " 21.0 - 32.0 sec Final    Prothrombin Time 07/11/2023 11.1  9.0 - 12.5 sec Final    INR 07/11/2023 1.0  0.8 - 1.2 Final    Group & Rh 07/11/2023 A NEG   Final    Indirect Danitza 07/11/2023 NEG   Final    Specimen Outdate 07/11/2023 07/14/2023 23:59   Final         Problem List Items Addressed This Visit          Neuro    Essential tremor - Primary    Current Assessment & Plan     Longtsanding ET-like tremor D/L DBS VIM  At times dystonic pulling R hand and possibly neck typical to ET  ETOH responsive    L Vim can cause dysarthria  Changed Groups to avoid stimulating this site  A, C, D all new  B is old    Good tremor control     She knows to turn DBS OFF at night and ON in the morning            I spent 45 minutes programming DBS        Zainab Shea MD, MS [facility]  Department of Neurology  Movement Disorders

## 2024-01-22 ENCOUNTER — HOSPITAL ENCOUNTER (OUTPATIENT)
Facility: HOSPITAL | Age: 69
Discharge: HOME | End: 2024-01-22
Attending: STUDENT IN AN ORGANIZED HEALTH CARE EDUCATION/TRAINING PROGRAM | Admitting: STUDENT IN AN ORGANIZED HEALTH CARE EDUCATION/TRAINING PROGRAM
Payer: MEDICARE

## 2024-01-22 DIAGNOSIS — M47.817 SPONDYLOSIS OF LUMBOSACRAL REGION WITHOUT MYELOPATHY OR RADICULOPATHY: Primary | ICD-10-CM

## 2024-01-22 DIAGNOSIS — M47.896 OTHER SPONDYLOSIS, LUMBAR REGION: ICD-10-CM

## 2024-01-22 PROCEDURE — 64494 INJ PARAVERT F JNT L/S 2 LEV: CPT | Mod: 50 | Performed by: STUDENT IN AN ORGANIZED HEALTH CARE EDUCATION/TRAINING PROGRAM

## 2024-01-22 PROCEDURE — 25000003 PHARM REV CODE 250: Performed by: STUDENT IN AN ORGANIZED HEALTH CARE EDUCATION/TRAINING PROGRAM

## 2024-01-22 PROCEDURE — 64493 INJ PARAVERT F JNT L/S 1 LEV: CPT | Mod: 50 | Performed by: STUDENT IN AN ORGANIZED HEALTH CARE EDUCATION/TRAINING PROGRAM

## 2024-01-22 PROCEDURE — 64493 INJ PARAVERT F JNT L/S 1 LEV: CPT | Mod: 50,,, | Performed by: STUDENT IN AN ORGANIZED HEALTH CARE EDUCATION/TRAINING PROGRAM

## 2024-01-22 PROCEDURE — 63600175 PHARM REV CODE 636 W HCPCS: Mod: JZ,JG | Performed by: STUDENT IN AN ORGANIZED HEALTH CARE EDUCATION/TRAINING PROGRAM

## 2024-01-22 PROCEDURE — 64494 INJ PARAVERT F JNT L/S 2 LEV: CPT | Mod: 50,,, | Performed by: STUDENT IN AN ORGANIZED HEALTH CARE EDUCATION/TRAINING PROGRAM

## 2024-01-22 RX ORDER — SODIUM CHLORIDE, SODIUM LACTATE, POTASSIUM CHLORIDE, CALCIUM CHLORIDE 600; 310; 30; 20 MG/100ML; MG/100ML; MG/100ML; MG/100ML
INJECTION, SOLUTION INTRAVENOUS CONTINUOUS
Status: ACTIVE | OUTPATIENT
Start: 2024-01-22

## 2024-01-22 RX ORDER — BUPIVACAINE HYDROCHLORIDE 2.5 MG/ML
INJECTION, SOLUTION EPIDURAL; INFILTRATION; INTRACAUDAL
Status: DISCONTINUED | OUTPATIENT
Start: 2024-01-22 | End: 2024-01-22 | Stop reason: HOSPADM

## 2024-01-22 RX ORDER — LIDOCAINE HYDROCHLORIDE 10 MG/ML
1 INJECTION, SOLUTION EPIDURAL; INFILTRATION; INTRACAUDAL; PERINEURAL ONCE
Status: ACTIVE | OUTPATIENT
Start: 2024-01-22

## 2024-01-22 RX ORDER — LIDOCAINE HYDROCHLORIDE 10 MG/ML
INJECTION, SOLUTION EPIDURAL; INFILTRATION; INTRACAUDAL; PERINEURAL
Status: DISCONTINUED | OUTPATIENT
Start: 2024-01-22 | End: 2024-01-22 | Stop reason: HOSPADM

## 2024-01-22 RX ORDER — DULOXETIN HYDROCHLORIDE 20 MG/1
20 CAPSULE, DELAYED RELEASE ORAL DAILY
COMMUNITY

## 2024-01-22 NOTE — DISCHARGE SUMMARY
[facility] - Periop Services  Discharge Note  Short Stay    Procedure(s) (LRB):  Block-nerve-medial branch-lumbar (Bilateral)      OUTCOME: Patient tolerated treatment/procedure well without complication and is now ready for discharge.    DISPOSITION: Home or Self Care    FINAL DIAGNOSIS:  <principal problem not specified>    FOLLOWUP: In clinic    DISCHARGE INSTRUCTIONS:    Discharge Procedure Orders   Notify your health care provider if you experience any of the following:  temperature >100.4     Notify your health care provider if you experience any of the following:  severe uncontrolled pain     Notify your health care provider if you experience any of the following:  redness, tenderness, or signs of infection (pain, swelling, redness, odor or green/yellow discharge around incision site)     Activity as tolerated        TIME SPENT ON DISCHARGE: 20 minutes

## 2024-01-22 NOTE — OP NOTE
Patient: Anna Wagoner                                                    MRN: [id number]  : 1955                                              Date of procedure: 2024        Pre Procedure Diagnosis: Back pain, Lumbar spondylosis without myelopathy/ lumbosacral region    Post Procedure Diagnosis: Same    Procedure: Bilateral Lumbar Medial Branch Nerve Block for L4-5 and L5-S1 joints under Fluoroscopy     Attending: Fabienne Donaldson M.D.    Local Anesthetic Injected: 1% Lidocaine 10 ml    Sedation Medications: None    Estimated Blood Loss: None    Complication: None        Procedure:  After informed consent was obtained, patient was taken to the fluoroscopy suite and placed in a prone position.  The skin was prepped and draped in the usual sterile fashion using chlorhexidine.  Anatomical landmarks were identified with the aid of fluoroscopy in PA and Oblique views, and the skin and subcutaneous tissue were infiltrated with a total of 10mL of 1% Lidocaine via a 25-gauge 1.5inch needle at each of the intended entry sites.  Subsequently, three 22-gauge 3.5inch needles were advanced with the aid of fluoroscopy such that their tips were located at the respective positions of the superior medial border of the transverse processes with the posterior elements at each level.  Needle placement was confirmed with the aid of fluoroscopy.  After negative aspiration, 0.5mL of 0.25% Bupivicaine was injected at each level.  This was repeated on the other side. There were no complications or paresthesias.   Patient tolerated the procedure well and all needles were removed intact.    Patient was observed in recovery and discharged home with written instruction under supervision in stable condition.

## 2024-01-22 NOTE — H&P
CC: back pain    HPI: The patient is a 68 y.o. female with a history of back pain here for bilateral medial branch blocks L4-5 and L5-S1. There are no major changes in history and physical from 11/30/23 by Dr. Donaldson.    Past Medical History:   Diagnosis Date    Anxiety     Back pain     Hashimoto's thyroiditis     Hypertension     Neck pain, chronic        Past Surgical History:   Procedure Laterality Date    BREAST SURGERY      CHOLECYSTECTOMY      DEEP BRAIN STIMULATOR PLACEMENT Left 4/18/2023    Procedure: INSERTION, DEEP BRAIN STIMULATOR;  Surgeon: Bonifacio Kraft MD;  [facility];  Service: Neurosurgery;  Laterality: Left;  INSERTION ELECTRODES FOR DEEP BRAIN STIMULATION/ BILATERAL VENTRALIS INTERMEDIUS/ LEFT SIDE FIRST, RIGHT SIDE SECOND/ TEDS & SCD/ [facility], SACHI MENCHACA.    DEEP BRAIN STIMULATOR PLACEMENT Right 7/11/2023    Procedure: INSERTION, DEEP BRAIN STIMULATOR;  Surgeon: Bonifacio Kraft MD;  [facility];  Service: Neurosurgery;  Laterality: Right;  INSERT ELECTRODE FOR DEEP BRAIN STIMULATION/RIGHT VENTRALIS INTERMEDIUS/ TEDS & SCD/MEDTRONICS, PAPITO MORALES.    HYSTERECTOMY      INJECTION OF ANESTHETIC AGENT AROUND MEDIAL BRANCH NERVES INNERVATING LUMBAR FACET JOINT Bilateral 12/18/2023    Procedure: Block-nerve-medial branch-lumbar;  Surgeon: Fabienne Donaldson MD;  Location: [facility];  Service: Anesthesiology;  Laterality: Bilateral;  L4/5 and l5/s1 MBB    INSERTION OF DEEP BRAIN STIMULATOR GENERATOR Left 4/25/2023    Procedure: INSERTION, PULSE GENERATOR, DEEP BRAIN STIMULATOR;  Surgeon: Bonifacio Kraft MD;  Location: [facility] OR [address] [place] [address];  Service: Neurosurgery;  Laterality: Left;  INSERT PULSE GENERATOR FOR DEEP BRAIN STIMULATION/TEDS & SCD/ [facility]TR[facility]ICS, SACHI MENCHACA.    PLACEMENT OF FIDUCIAL SCREW INTO SPINE N/A 4/18/2023    Procedure: INSERTION, FIDUCIAL SCREW, SKULL/DBS;  Surgeon: Bonifacio Kraft MD;  [facility];  Service: Neurosurgery;  Laterality: N/A;   "APPLICATION OF FIDUCIALS FOR DEEP BRAIN STIMULATION/ TEDS & SCD/ MEDTRONICS, SACHI MENCHACA.    PLACEMENT OF FIDUCIAL SCREW INTO SPINE N/A 7/11/2023    Procedure: INSERTION, FIDUCIAL SCREW, SKULL/DBS;  Surgeon: Bonifacio Kraft MD;  Location: [facility] OR [address] [place] [address];  Service: Neurosurgery;  Laterality: N/A;  INSERTION FIDUCIALS FOR DEEP BRAIN STIMULATION/ TEDS & SCD/ MEDTRONICS, PAPITO MORALES.    TONSILLECTOMY         Family History   Problem Relation Age of Onset    Tremor Mother        Social History     Socioeconomic History    Marital status:    Tobacco Use    Smoking status: Former     Types: Cigarettes     Passive exposure: Never    Smokeless tobacco: Never       No current facility-administered medications for this encounter.       Review of patient's allergies indicates:   Allergen Reactions    Metoprolol succinate Hives and Itching     Hives - wheals       Vitals:    01/17/24 1424   Weight: 97.6 kg (215 lb 2.7 oz)   Height: 5' 3" (1.6 m)       REVIEW OF SYSTEMS:     GENERAL: No weight loss, malaise or fevers.  HEENT:  No recent changes in vision or hearing  NECK: Negative for lumps, no difficulty with swallowing.  RESPIRATORY: Negative for cough, wheezing or shortness of breath, patient denies any recent URI.  CARDIOVASCULAR: Negative for chest pain, leg swelling or palpitations.  GI: Negative for abdominal discomfort, blood in stools or black stools or change in bowel habits.  MUSCULOSKELETAL: See HPI.  SKIN: Negative for lesions, rash, and itching.  PSYCH: No suicidal or homicidal ideations, no current mood disturbances.  HEMATOLOGY/LYMPHOLOGY: Negative for prolonged bleeding, bruising easily or swollen nodes. Patient is not currently taking any anti-coagulants  ENDO: No history of diabetes or thyroid dysfunction  NEURO: No history of syncope, paralysis, seizures or tremors.All other reviewed and negative other than HPI.    Physical exam:  Gen: A and O x3, pleasant, well-groomed  Skin: No rashes or obvious " lesions  HEENT: PERRLA, no obvious deformities on ears or in canals. No thyroid masses, trachea midline, no palpable lymph nodes in neck, axilla.  CVS: Regular rate and rhythm, normal S1 and S2, no murmurs.  Resp: Clear to auscultation bilaterally.  Abdomen: Soft, NT/ND, normal bowel sounds present.  Musculoskeletal/Neuro: Moving all extremities    Assessment:  Spondylosis of lumbosacral region without myelopathy or radiculopathy    Other spondylosis, lumbar region          PLAN: B MBB L4-5 and L5-S1      This patient has been cleared for surgery in an ambulatory surgical facility    ASA 3,  Mallampatti Score 3  No history of anesthetic complications  Plan for RN IV sedation

## 2024-01-22 NOTE — PLAN OF CARE
Discharge instructions given to pt, verbalized understanding.  No c/o pain while lying, but does have pain while standing.  Ambulating out to self care in no distress.

## 2024-01-22 NOTE — H&P (VIEW-ONLY)
CC: back pain    HPI: The patient is a 68 y.o. female with a history of back pain here for bilateral medial branch blocks L4-5 and L5-S1. There are no major changes in history and physical from 11/30/23 by Dr. Donaldson.    Past Medical History:   Diagnosis Date    Anxiety     Back pain     Hashimoto's thyroiditis     Hypertension     Neck pain, chronic        Past Surgical History:   Procedure Laterality Date    BREAST SURGERY      CHOLECYSTECTOMY      DEEP BRAIN STIMULATOR PLACEMENT Left 4/18/2023    Procedure: INSERTION, DEEP BRAIN STIMULATOR;  Surgeon: Bonifacio Kraft MD;  [facility];  Service: Neurosurgery;  Laterality: Left;  INSERTION ELECTRODES FOR DEEP BRAIN STIMULATION/ BILATERAL VENTRALIS INTERMEDIUS/ LEFT SIDE FIRST, RIGHT SIDE SECOND/ TEDS & SCD/ [facility], SACHI MENCHACA.    DEEP BRAIN STIMULATOR PLACEMENT Right 7/11/2023    Procedure: INSERTION, DEEP BRAIN STIMULATOR;  Surgeon: Bonifacio Kraft MD;  [facility];  Service: Neurosurgery;  Laterality: Right;  INSERT ELECTRODE FOR DEEP BRAIN STIMULATION/RIGHT VENTRALIS INTERMEDIUS/ TEDS & SCD/[facility], PAPITO MORALES.    HYSTERECTOMY      INJECTION OF ANESTHETIC AGENT AROUND MEDIAL BRANCH NERVES INNERVATING LUMBAR FACET JOINT Bilateral 12/18/2023    Procedure: Block-nerve-medial branch-lumbar;  Surgeon: Fabienne Donaldson MD;  Location: [facility];  Service: Anesthesiology;  Laterality: Bilateral;  L4/5 and l5/s1 MBB    INSERTION OF DEEP BRAIN STIMULATOR GENERATOR Left 4/25/2023    Procedure: INSERTION, PULSE GENERATOR, DEEP BRAIN STIMULATOR;  Surgeon: Bonifacio Kraft MD;  Location: [facility] OR [address] [place] [address];  Service: Neurosurgery;  Laterality: Left;  INSERT PULSE GENERATOR FOR DEEP BRAIN STIMULATION/TEDS & SCD/ [employer], SACHI MENCHACA.    PLACEMENT OF FIDUCIAL SCREW INTO SPINE N/A 4/18/2023    Procedure: INSERTION, FIDUCIAL SCREW, SKULL/DBS;  Surgeon: Bonifacio Kraft MD;  [facility];  Service: Neurosurgery;  Laterality: N/A;   "APPLICATION OF FIDUCIALS FOR DEEP BRAIN STIMULATION/ TEDS & SCD/ [facility], SACHI MENCHACA.    PLACEMENT OF FIDUCIAL SCREW INTO SPINE N/A 7/11/2023    Procedure: INSERTION, FIDUCIAL SCREW, SKULL/DBS;  Surgeon: Bonifacio Kraft MD;  Location: [facility] OR [address] [place] [address];  Service: Neurosurgery;  Laterality: N/A;  INSERTION FIDUCIALS FOR DEEP BRAIN STIMULATION/ TEDS & SCD/ [employer], PAPITO MORALES.    TONSILLECTOMY         Family History   Problem Relation Age of Onset    Tremor Mother        Social History     Socioeconomic History    Marital status:    Tobacco Use    Smoking status: Former     Types: Cigarettes     Passive exposure: Never    Smokeless tobacco: Never       No current facility-administered medications for this encounter.       Review of patient's allergies indicates:   Allergen Reactions    Metoprolol succinate Hives and Itching     Hives - wheals       Vitals:    01/17/24 1424   Weight: 97.6 kg (215 lb 2.7 oz)   Height: 5' 3" (1.6 m)       REVIEW OF SYSTEMS:     GENERAL: No weight loss, malaise or fevers.  HEENT:  No recent changes in vision or hearing  NECK: Negative for lumps, no difficulty with swallowing.  RESPIRATORY: Negative for cough, wheezing or shortness of breath, patient denies any recent URI.  CARDIOVASCULAR: Negative for chest pain, leg swelling or palpitations.  GI: Negative for abdominal discomfort, blood in stools or black stools or change in bowel habits.  MUSCULOSKELETAL: See HPI.  SKIN: Negative for lesions, rash, and itching.  PSYCH: No suicidal or homicidal ideations, no current mood disturbances.  HEMATOLOGY/LYMPHOLOGY: Negative for prolonged bleeding, bruising easily or swollen nodes. Patient is not currently taking any anti-coagulants  ENDO: No history of diabetes or thyroid dysfunction  NEURO: No history of syncope, paralysis, seizures or tremors.All other reviewed and negative other than HPI.    Physical exam:  Gen: A and O x3, pleasant, well-groomed  Skin: No rashes or obvious " lesions  HEENT: PERRLA, no obvious deformities on ears or in canals. No thyroid masses, trachea midline, no palpable lymph nodes in neck, axilla.  CVS: Regular rate and rhythm, normal S1 and S2, no murmurs.  Resp: Clear to auscultation bilaterally.  Abdomen: Soft, NT/ND, normal bowel sounds present.  Musculoskeletal/Neuro: Moving all extremities    Assessment:  Spondylosis of lumbosacral region without myelopathy or radiculopathy    Other spondylosis, lumbar region          PLAN: B MBB L4-5 and L5-S1      This patient has been cleared for surgery in an ambulatory surgical facility    ASA 3,  Mallampatti Score 3  No history of anesthetic complications  Plan for RN IV sedation

## 2024-01-23 ENCOUNTER — TELEPHONE (OUTPATIENT)
Dept: PAIN MEDICINE | Facility: CLINIC | Age: 69
End: 2024-01-23
Payer: MEDICARE

## 2024-01-23 VITALS
BODY MASS INDEX: 38.13 KG/M2 | RESPIRATION RATE: 17 BRPM | DIASTOLIC BLOOD PRESSURE: 57 MMHG | OXYGEN SATURATION: 95 % | SYSTOLIC BLOOD PRESSURE: 113 MMHG | TEMPERATURE: 98 F | HEIGHT: 63 IN | WEIGHT: 215.19 LBS | HEART RATE: 62 BPM

## 2024-01-23 DIAGNOSIS — M47.816 LUMBAR SPONDYLOSIS: Primary | ICD-10-CM

## 2024-01-23 NOTE — TELEPHONE ENCOUNTER
Pt has 80% relief for 4 hours from Mbb #2 starting pain score was 8 and after procedure 2 then now 6/10 scheduled for 02/12 instructions given

## 2024-02-07 ENCOUNTER — PATIENT MESSAGE (OUTPATIENT)
Dept: RESEARCH | Facility: HOSPITAL | Age: 69
End: 2024-02-07
Payer: MEDICARE

## 2024-02-08 NOTE — DISCHARGE INSTRUCTIONS
Before leaving please make sure you have all your personal belongings such as glasses, purses, wallets, keys, cell phone, jewelry, jackets etc     Radiofrequency of Nerves     This procedure may take several weeks to relieve pain You may get some pain relief from the local anesthetic initally.     A steroid may also be injected to help with pain relief. Steroids can have side effect of flushed face and nervous feeling.     If Diabetic, monitor your glucose carefully due to steroids could raise blood sugar.     Wait until tomorrow to resume any blood thinners (aspirin, Plavix, Coumadin) but you may resume all your other medications today unless otherwise instructed by your MD.     You can resume your normal diet. If nauseated start with a bland diet and gradually increase to normal diet.     Due to having anesthesia do not drive, drink alcohol, or make legal decisions for 24 hours.     Do not lift over 10lbs for the first 24 hours, gradually increase your activities as tolerated.     No heat at the injection sites, including heating pads, hot tubs, saunas, swimming or tub baths for 2 full days.     Use ice pack for mild swelling and for comfort , apply for 20 minutes at a time.     You may shower today.     When to call your doctor   Call right away if you notice any of the following symptoms:   Severe pain or headache that is unrelieved with medication   Fever > 100.4 or chills   Severe redness or swelling around the injection site   Chest pain or Shortness of breath   Continuous Vomiting   Increasing numbness or weakness in arms or legs lasting greater than 8 hours   If you are diabetic, a steroid injection can increase your blood sugar so moniter it carefully.     After Surgery:   Always be aware that any surgery can cause these symptoms:   ?   Pain- Medication can be prescribed for pain to decrease your pain but may not completely take your pain away. Over the Counter pain medicine my be enough and you can  always use Ice and rest to help ease pain.   ?   Bleeding- a little bleeding after a surgery is usually within normal. If there is a lot of blood you need to notify your MD. Emergency treatments of bleeding are cold application, elevation of the bleeding site and compression.   ?   Infection- Infection after surgery is NOT a normal occurrence. Signs of infection are fever, swelling, hot to touch the incision. If this occurs notify your MD immediately.   ?   Nausea- this can be common after a surgery especially if you have had anesthesia medicine or are taking pain medicine. The steroid the Dr injected can have a side effect of Nausea. Staying on clear liquids, bland foods, gingerale, or over the counter anti nausea medicines can help. If you vomit more than once, notify your MD. Anti Nausea medicines can be prescribed.

## 2024-02-12 ENCOUNTER — HOSPITAL ENCOUNTER (OUTPATIENT)
Facility: HOSPITAL | Age: 69
Discharge: HOME | End: 2024-02-12
Attending: STUDENT IN AN ORGANIZED HEALTH CARE EDUCATION/TRAINING PROGRAM | Admitting: STUDENT IN AN ORGANIZED HEALTH CARE EDUCATION/TRAINING PROGRAM
Payer: MEDICARE

## 2024-02-12 DIAGNOSIS — M47.817 SPONDYLOSIS OF LUMBOSACRAL REGION WITHOUT MYELOPATHY OR RADICULOPATHY: Primary | ICD-10-CM

## 2024-02-12 DIAGNOSIS — M47.896 OTHER SPONDYLOSIS, LUMBAR REGION: ICD-10-CM

## 2024-02-12 PROCEDURE — 64636 DESTROY L/S FACET JNT ADDL: CPT | Mod: 50 | Performed by: STUDENT IN AN ORGANIZED HEALTH CARE EDUCATION/TRAINING PROGRAM

## 2024-02-12 PROCEDURE — 99152 MOD SED SAME PHYS/QHP 5/>YRS: CPT | Performed by: STUDENT IN AN ORGANIZED HEALTH CARE EDUCATION/TRAINING PROGRAM

## 2024-02-12 PROCEDURE — 64636 DESTROY L/S FACET JNT ADDL: CPT | Mod: 50,,, | Performed by: STUDENT IN AN ORGANIZED HEALTH CARE EDUCATION/TRAINING PROGRAM

## 2024-02-12 PROCEDURE — 64635 DESTROY LUMB/SAC FACET JNT: CPT | Mod: 50 | Performed by: STUDENT IN AN ORGANIZED HEALTH CARE EDUCATION/TRAINING PROGRAM

## 2024-02-12 PROCEDURE — 64635 DESTROY LUMB/SAC FACET JNT: CPT | Mod: 50,,, | Performed by: STUDENT IN AN ORGANIZED HEALTH CARE EDUCATION/TRAINING PROGRAM

## 2024-02-12 PROCEDURE — 99153 MOD SED SAME PHYS/QHP EA: CPT | Performed by: STUDENT IN AN ORGANIZED HEALTH CARE EDUCATION/TRAINING PROGRAM

## 2024-02-12 PROCEDURE — 25000003 PHARM REV CODE 250: Performed by: STUDENT IN AN ORGANIZED HEALTH CARE EDUCATION/TRAINING PROGRAM

## 2024-02-12 PROCEDURE — 63600175 PHARM REV CODE 636 W HCPCS: Performed by: STUDENT IN AN ORGANIZED HEALTH CARE EDUCATION/TRAINING PROGRAM

## 2024-02-12 RX ORDER — FENTANYL CITRATE 50 UG/ML
INJECTION, SOLUTION INTRAMUSCULAR; INTRAVENOUS
Status: DISCONTINUED | OUTPATIENT
Start: 2024-02-12 | End: 2024-02-12 | Stop reason: HOSPADM

## 2024-02-12 RX ORDER — MIDAZOLAM HYDROCHLORIDE 1 MG/ML
INJECTION, SOLUTION INTRAMUSCULAR; INTRAVENOUS
Status: DISCONTINUED | OUTPATIENT
Start: 2024-02-12 | End: 2024-02-12 | Stop reason: HOSPADM

## 2024-02-12 RX ORDER — LIDOCAINE HYDROCHLORIDE 10 MG/ML
1 INJECTION, SOLUTION EPIDURAL; INFILTRATION; INTRACAUDAL; PERINEURAL ONCE
Status: DISCONTINUED | OUTPATIENT
Start: 2024-02-12 | End: 2024-02-12 | Stop reason: HOSPADM

## 2024-02-12 RX ORDER — DEXAMETHASONE SODIUM PHOSPHATE 100 MG/10ML
INJECTION INTRAMUSCULAR; INTRAVENOUS
Status: DISCONTINUED | OUTPATIENT
Start: 2024-02-12 | End: 2024-02-12 | Stop reason: HOSPADM

## 2024-02-12 RX ORDER — LIDOCAINE HYDROCHLORIDE 10 MG/ML
INJECTION INFILTRATION; PERINEURAL
Status: DISCONTINUED | OUTPATIENT
Start: 2024-02-12 | End: 2024-02-12 | Stop reason: HOSPADM

## 2024-02-12 RX ORDER — SODIUM CHLORIDE, SODIUM LACTATE, POTASSIUM CHLORIDE, CALCIUM CHLORIDE 600; 310; 30; 20 MG/100ML; MG/100ML; MG/100ML; MG/100ML
INJECTION, SOLUTION INTRAVENOUS CONTINUOUS
Status: DISCONTINUED | OUTPATIENT
Start: 2024-02-12 | End: 2024-02-12 | Stop reason: HOSPADM

## 2024-02-12 RX ORDER — LIDOCAINE HYDROCHLORIDE 20 MG/ML
INJECTION, SOLUTION EPIDURAL; INFILTRATION; INTRACAUDAL; PERINEURAL
Status: DISCONTINUED | OUTPATIENT
Start: 2024-02-12 | End: 2024-02-12 | Stop reason: HOSPADM

## 2024-02-12 RX ORDER — BUPIVACAINE HYDROCHLORIDE 2.5 MG/ML
INJECTION, SOLUTION EPIDURAL; INFILTRATION; INTRACAUDAL
Status: DISCONTINUED | OUTPATIENT
Start: 2024-02-12 | End: 2024-02-12 | Stop reason: HOSPADM

## 2024-02-12 RX ADMIN — SODIUM CHLORIDE, POTASSIUM CHLORIDE, SODIUM LACTATE AND CALCIUM CHLORIDE: 600; 310; 30; 20 INJECTION, SOLUTION INTRAVENOUS at 08:02

## 2024-02-12 RX ADMIN — LIDOCAINE HYDROCHLORIDE 50 MG: 10 INJECTION, SOLUTION EPIDURAL; INFILTRATION; INTRACAUDAL; PERINEURAL at 08:02

## 2024-02-12 NOTE — PLAN OF CARE
Dr Donaldson at bedside speaking with patient post procedure. Candice is driving patient home and at bedside.

## 2024-02-12 NOTE — DISCHARGE SUMMARY
[facility] - Periop Services  Discharge Note  Short Stay    Procedure(s) (LRB):  Radiofrequency Ablation, Nerve, Spinal, Lumbar, Medial Branch, 1 Level (Bilateral)      OUTCOME: Patient tolerated treatment/procedure well without complication and is now ready for discharge.    DISPOSITION: Home or Self Care    FINAL DIAGNOSIS:  <principal problem not specified>    FOLLOWUP: In clinic    DISCHARGE INSTRUCTIONS:    Discharge Procedure Orders   Notify your health care provider if you experience any of the following:  temperature >100.4     Notify your health care provider if you experience any of the following:  severe uncontrolled pain     Notify your health care provider if you experience any of the following:  redness, tenderness, or signs of infection (pain, swelling, redness, odor or green/yellow discharge around incision site)     Activity as tolerated        TIME SPENT ON DISCHARGE: 20 minutes

## 2024-02-12 NOTE — OP NOTE
Patient: Anna Wagoner                                                    MRN: [id number]  : 1955                                              Date of procedure: 2024      Pre Procedure Diagnosis: Low back pain, Lumbago, Lumbar spondylosis without myelopathy/ lumbrosacral region    Post Procedure Diagnosis: Same    Procedure: Bilateral Lumbar Medial Branch Nerve Rhizotomy for  L4-5 and L5-S1 joints under Fluoroscopy     Attending: Fabienne Donaldson MD    Local Anesthetic Injected: 1% Lidocaine 10 ml    Sedation Medications: See RN note    Estimated Blood Loss: None    Complication: None      Procedure:  After informed consent was obtained, patient was taken to the fluoroscopy suite and placed in a prone position.  The skin was prepped and draped in the usual sterile fashion using chlorhexidine.  Anatomical landmarks were identified with the aid of fluoroscopy in PA and Oblique views, and the skin and subcutaneous tissue were infiltrated with a total of 5mL of 1% Lidocaine via a 25-gauge 1.5inch needle at each of the intended entry sites.  Subsequently, three 22-gauge 100mm 10mm tip introducer needles were advanced with the aid of fluoroscopy such that their tips were located at the respective positions of the superior medial border of the transverse processes with the posterior elements at each level.  Needle placement was confirmed with the aid of fluoroscopy.  Motor stimulation up to 2 Volts at each level confirmed no motor nerve involvement.  Impedance was less than 800 ohms at each level.  1ml of 2% lidocaine was instilled prior to lesioning.  Ablation was performed per level utilizing radiofrequency generator 80°C for 90 seconds. Then 0.5mL of a mixture of 0.5mL of Dexamethasone 10mg/mL and 2mL of 0.25% Bupivacaine was injected at each level.  There were no complications or paresthesias.   Patient tolerated the procedure well and all needles were removed intact.    Patient was observed in recovery and  discharged home with written instruction under supervision in stable condition.

## 2024-02-14 VITALS
HEART RATE: 69 BPM | DIASTOLIC BLOOD PRESSURE: 60 MMHG | TEMPERATURE: 98 F | WEIGHT: 215 LBS | HEIGHT: 63 IN | BODY MASS INDEX: 38.09 KG/M2 | SYSTOLIC BLOOD PRESSURE: 121 MMHG | RESPIRATION RATE: 17 BRPM | OXYGEN SATURATION: 95 %

## 2024-04-05 ENCOUNTER — OFFICE VISIT (OUTPATIENT)
Dept: PAIN MEDICINE | Facility: CLINIC | Age: 69
End: 2024-04-05
Payer: MEDICARE

## 2024-04-05 VITALS — HEIGHT: 63 IN | WEIGHT: 215 LBS | BODY MASS INDEX: 38.09 KG/M2

## 2024-04-05 DIAGNOSIS — M54.9 BACK PAIN, UNSPECIFIED BACK LOCATION, UNSPECIFIED BACK PAIN LATERALITY, UNSPECIFIED CHRONICITY: Primary | ICD-10-CM

## 2024-04-05 PROCEDURE — 99999 PR PBB SHADOW E&M-EST. PATIENT-LVL III: CPT | Mod: PBBFAC,,, | Performed by: STUDENT IN AN ORGANIZED HEALTH CARE EDUCATION/TRAINING PROGRAM

## 2024-04-05 PROCEDURE — 99213 OFFICE O/P EST LOW 20 MIN: CPT | Mod: PBBFAC,PN | Performed by: STUDENT IN AN ORGANIZED HEALTH CARE EDUCATION/TRAINING PROGRAM

## 2024-04-05 PROCEDURE — 99213 OFFICE O/P EST LOW 20 MIN: CPT | Mod: S$PBB,,, | Performed by: STUDENT IN AN ORGANIZED HEALTH CARE EDUCATION/TRAINING PROGRAM

## 2024-04-05 NOTE — PROGRESS NOTES
Office Note    Victor Hugo Evans MD      First Office Visit: 11/30/23  Today' Date: 4/5/2024  Last Office Visit: 11/30/23    Chief complaint: back pain     HPI: Pt is a pleasent 68 y.o., who presents for evaluation. Referred by Krystal Helton NP. Pt seen previously for back pain and R leg pain. Pt seen today for f/u from B RFA L4-5 and L5-S1 on 2/12/24. Pt states she has had close to complete pain relief. States she previously had pain going down the R leg but it was only one time and she no longer has the pain. Overall has been doing well post injection. Is doing HEP.     Pain score: 0  Pain disability score: 35      Relevant Imaging/ Testing:   MR L-spine 9/23  XR C-spine 3/23  CT C-spine 3/23  MR C-spine 2/23  XR T-spine 7/21  XR L-spine 8/20    Procedures:   B RFA L4-5 and L5-S1 2/12/24  B MBB L4-5 and L5-S1 12/18/23, 1/22/24      Date of board of pharmacy review:4/5/2024  Date of opioid risk screening/ pain psych: None  Date of opioid agreement and consent: None  Date of urine drug screen: None  Date of random pill count: None     was reviewed today: reviewed, no concerns     Prescribed medications: None    See EHR for  PMH, PSH, FH, SH, Medications and Allergy    ROS:  Positive for pain  ROS     PE:  There were no vitals filed for this visit.    General: Pleasant, no distress  HEENT: NC/ AT. PERRLA  CV: Radial pulses intact  Pulm: No distress  Ext: No edema    Physical Exam     Neuromusculoskeletal:  Head: NC, AT. PERRLA  Neck: Intact range of motions, extension, flexion, rotation. Neg Facet loading. Neg Spurling. Min Tenderness.  5/5 Strength, normal tone  Shoulder: Intact range of motion  Lumbar: Intact range of motion. Pos Facet loading bilaterally. Min Tenderness. Neg SL   Hip: Intact range of motion  SI: Level, Min tenderness  Knee: Intact range of motion  Reflexes: normal Knee  Strength: 5/5 globally   Sensory: Grossly intact   Skin: No bruising, erythema  Gait: Normal      Impression:  Back  pain  Axial back pain   R leg pain  Relevant History  BMI 38.26  Anxiety   RLS  Parkinson's  Vit D deficiency         Plan:  Discussed options  Imaging/ relevant records viewed/ reviewed/ discussed  Imaging results viewed and reviewed (noted above)/ reviewed with patient   reviewed  Cont HEP  B RFA L4-5 and L5-S1 prn pain      Prescribed medications:  1. None    The impression and plan were discussed and explained in detail. All the questions were answered. Education was provided accordingly.         Follow-up:  As needed    Fabienne Donaldson MD

## 2024-04-26 ENCOUNTER — PATIENT MESSAGE (OUTPATIENT)
Dept: PAIN MEDICINE | Facility: CLINIC | Age: 69
End: 2024-04-26
Payer: MEDICARE

## 2024-05-13 ENCOUNTER — PATIENT MESSAGE (OUTPATIENT)
Dept: NEUROLOGY | Facility: CLINIC | Age: 69
End: 2024-05-13
Payer: MEDICARE

## 2024-07-11 ENCOUNTER — PATIENT MESSAGE (OUTPATIENT)
Dept: NEUROLOGY | Facility: CLINIC | Age: 69
End: 2024-07-11
Payer: MEDICARE

## 2024-07-15 ENCOUNTER — TELEPHONE (OUTPATIENT)
Dept: NEUROLOGY | Facility: CLINIC | Age: 69
End: 2024-07-15
Payer: MEDICARE

## 2024-07-15 ENCOUNTER — PATIENT MESSAGE (OUTPATIENT)
Dept: NEUROLOGY | Facility: CLINIC | Age: 69
End: 2024-07-15
Payer: MEDICARE

## 2024-07-15 NOTE — TELEPHONE ENCOUNTER
Called patient about setting up an appointment to see the doctor. Patient picks up and said she needs to be programmed. Patient informs me that she took a bad fall at the ball game. Walking down on bleachers missed the last 3 steps hit her head. Her side effects are, Gets off balanced not dizzy, slurred speech and lose track of though. I inform the patient that we are booked all the wait out in November. But I also let her know I will be sending a message to the doctor and flagging it as a yanet priority so see can see the message right away. Also letting her know that ill be talking to the doctor to see if we can get her in sooner.

## 2024-07-15 NOTE — TELEPHONE ENCOUNTER
Called patient again about a 1pm appointment that opened up tomorrow. Patient has agreed to the appointment tomorrow the [facility]

## 2024-07-16 ENCOUNTER — PATIENT MESSAGE (OUTPATIENT)
Dept: NEUROLOGY | Facility: CLINIC | Age: 69
End: 2024-07-16

## 2024-07-16 ENCOUNTER — OFFICE VISIT (OUTPATIENT)
Dept: NEUROLOGY | Facility: CLINIC | Age: 69
End: 2024-07-16
Payer: MEDICARE

## 2024-07-16 ENCOUNTER — HOSPITAL ENCOUNTER (OUTPATIENT)
Dept: RADIOLOGY | Facility: HOSPITAL | Age: 69
Discharge: HOME | End: 2024-07-16
Attending: PSYCHIATRY & NEUROLOGY
Payer: MEDICARE

## 2024-07-16 DIAGNOSIS — S09.90XA HEAD TRAUMA, INITIAL ENCOUNTER: Primary | ICD-10-CM

## 2024-07-16 DIAGNOSIS — S09.90XA HEAD TRAUMA, INITIAL ENCOUNTER: ICD-10-CM

## 2024-07-16 DIAGNOSIS — G25.0 ESSENTIAL TREMOR: Primary | ICD-10-CM

## 2024-07-16 PROCEDURE — 95984 ALYS BRN NPGT PRGRMG ADDL 15: CPT | Mod: PBBFAC | Performed by: PSYCHIATRY & NEUROLOGY

## 2024-07-16 PROCEDURE — 70450 CT HEAD/BRAIN W/O DYE: CPT | Mod: 26,,, | Performed by: RADIOLOGY

## 2024-07-16 PROCEDURE — 95983 ALYS BRN NPGT PRGRMG 15 MIN: CPT | Mod: PBBFAC | Performed by: PSYCHIATRY & NEUROLOGY

## 2024-07-16 PROCEDURE — 99212 OFFICE O/P EST SF 10 MIN: CPT | Mod: PBBFAC,25 | Performed by: PSYCHIATRY & NEUROLOGY

## 2024-07-16 PROCEDURE — 99999 PR PBB SHADOW E&M-EST. PATIENT-LVL II: CPT | Mod: PBBFAC,,, | Performed by: PSYCHIATRY & NEUROLOGY

## 2024-07-16 PROCEDURE — 70450 CT HEAD/BRAIN W/O DYE: CPT | Mod: TC

## 2024-07-16 NOTE — ASSESSMENT & PLAN NOTE
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

## 2024-07-16 NOTE — PROGRESS NOTES
Movement Disorders -   Name: Anna Wagoner  MRN: [id number]   CSN: [id number]      Date: 07/16/2024    Referring physician:  No referring provider defined for this encounter.    Chief Complaint: tremor     Interval Hx  B.L VIM DBS    Had a fall in April 2024 did not hit head  Notices that some settings cause speech slur and imbalance    Continues to turn DBS OFF at night  Stopped lyrica      Previous  Interval Hx  Since last visit with RR, comes for evaluation for DBS  R handed woman.  Tried the CHERI trio which did not help.  He reports a b/l hand tremor since 9-13yo slowly progressive over her lifetime L>R in the last few years. Started to have a vocal tremor and head tremor in the last years. At this point she can no longer put on makeup, cannot write. At times drops items. Struggles to type and double types.    Since several year her R hand may cramp and fingers draw up.  At times feet cramp.  Chronic neck strain. Mentions she hs spinal stenosis but no record of MRI documenting.  Takes zanaflex 8mg QHS.  Had hallucinations on propranolol    Reports had a DATscan 2021 - [place]    At times unsteady gait. Falls once a year.  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 Some word-finding and repeating asking for tasks.    Son has a tremor  Otherwise no fam hx tremor    ----------------  Prior  Interval History:  - cheri-trio 60 day trial -- did not notice any improvement with the tremor  - L hand is a little worse, some change in direction   - notices it when holding things   - drops things with her R hand   - now off of phentermine   - feels that tremors slowed down with phentermine and even more with the increased dose   - drinks one cup of coffee in the morning    - tremor improves with etoh   - chronic headaches/migraines -- interested in seeing headache specialist here         From July 2022: Anna Wagoner is a R HANDED 69 y.o. female with a medical issues significant for HTN, HLD,  hypothyroidism (hashimotos), and anxiety who presents for tremors. Referral from [facility] [place]. Accompanied by daughter. Previously on primidone which she felt caused weight gain and constipation. No longer on it. She tried gabapentin and topamax in the past but stop due to cognitive side effects.  Propranolol caused nightmares and hallucinations?   She has had the tremors since she was at least 11 or 12 years old. Worse on the L hand now, feels that the tremor is progressing. Notices tremor whenever she is typing as well as whenever she is holding something. It does affect her handwriting. Tremor improves with EtOH. She is in a facebook ET group. She works full-time in [employer]. Plans to work for 5 more years.   Primidone helped the tremor but after a few weeks didn't find it as effective. Max dose was 100 mg BID. Felt that she was gaining weight on primidone. Does not think that this was because of thyroid issues. Per chart review, clonazepam caused daytime lethargy but she does not recall this. She asks about medications that she can take during the day.   Very sensitive to side effects of medications.   Currently taking phentermine right now for weight loss. Learning how to eat different. Years ago she tried topamax but caused cognitive side effects.    Not interested in DBS or focused ultrasound.   Asks about BPPV, sees ENT in [place].       From outside neurology provider note  67 y.o. female presents today for neurological follow up. Last seen in follow up with Dr. Desai in January 2022, when primidone was prescribed given findings most consistent with essential tremor. She does have an essential tremor (both limbs and voice) which she has had since about age 11. Tremor more noticeable in left upper extremity than right. Requests referral to movement specialist. Scheduled to see weight-loss specialist given weight gain she attributes to Primidone.   Neurocognitive status: Neurocognitive function  has remained stable.   Current pertinent medications:Primidone triggered weight gain and constipation, so she has discontinued. She tried gabapentin and topamax, but discontinued due to cognitive side effects. Klonopin prn triggered daytime lethargy. Propranolol triggered nightmares and hallucinations.         Family History: none     Neuroleptic Exposure: none        Review of Systems:   Review of Systems   Constitutional:  Negative for chills, fever and malaise/fatigue.   HENT:  Negative for hearing loss.    Eyes:  Negative for blurred vision and double vision.   Respiratory:  Negative for cough, shortness of breath and stridor.    Cardiovascular:  Negative for chest pain and leg swelling.   Gastrointestinal:  Negative for constipation, diarrhea and nausea.   Genitourinary:  Negative for frequency and urgency.   Musculoskeletal:  Negative for falls.   Skin:  Negative for itching and rash.   Neurological:  Positive for tremors. Negative for dizziness, loss of consciousness and weakness.   Psychiatric/Behavioral:  Negative for hallucinations and memory loss.            Past Medical History: The patient  has a past medical history of Anxiety, Back pain, Hashimoto's thyroiditis, Hypertension, and Neck pain, chronic.    Social History: The patient  reports that she has quit smoking. Her smoking use included cigarettes. She has never been exposed to tobacco smoke. She has never used smokeless tobacco.    Family History: Their family history includes Tremor in her mother.    Allergies: Metoprolol succinate     Meds:   Current Outpatient Medications on File Prior to Visit   Medication Sig Dispense Refill    buPROPion (WELLBUTRIN XL) 300 MG 24 hr tablet Take 300 mg by mouth daily as needed.      busPIRone (BUSPAR) 15 MG tablet Take 7.5-15 mg by mouth.      butalbital-acetaminophen-caff -40 mg Cap       carvediloL (COREG) 12.5 MG tablet Take 12.5 mg by mouth 2 (two) times daily.      cholecalciferol, vitamin D3, 75  mcg (3,000 unit) Tab Take 1 tablet by mouth once daily.      co-enzyme Q-10 30 mg capsule Take 30 mg by mouth once daily.      DULoxetine (CYMBALTA) 20 MG capsule Take 20 mg by mouth once daily.      ergocalciferol (ERGOCALCIFEROL) 50,000 unit Cap Take 50,000 Units by mouth every 7 days.      famotidine (PEPCID) 20 MG tablet Take 20 mg by mouth nightly.      ibuprofen (ADVIL,MOTRIN) 800 MG tablet Take 800 mg by mouth 2 (two) times daily as needed.      levothyroxine (SYNTHROID) 112 MCG tablet Take 112 mcg by mouth.      liothyronine (CYTOMEL) 5 MCG Tab Take 5 mcg by mouth.      ondansetron (ZOFRAN-ODT) 4 MG TbDL Dissolve 1 tablet (4 mg total) by mouth every 8 (eight) hours as needed (nausea). 30 tablet 0    pantoprazole (PROTONIX) 40 MG tablet Take 40 mg by mouth.      rosuvastatin (CRESTOR) 10 MG tablet Take 10 mg by mouth every evening.      tiZANidine (ZANAFLEX) 4 MG tablet Take 4 mg by mouth nightly.       Current Facility-Administered Medications on File Prior to Visit   Medication Dose Route Frequency Provider Last Rate Last Admin    lactated ringers infusion   Intravenous Continuous Fabienne Donaldson MD        LIDOcaine (PF) 10 mg/ml (1%) injection 10 mg  1 mL Intradermal Once Fabienne Donaldson MD           Exam:  There were no vitals taken for this visit.    Constitutional  Well-developed, well-nourished, appears stated age   Ophthalmoscopic  No papilledema with no hemorrhages or exudates bilaterally   Cardiovascular  Radial pulses 2+ and symmetric, no LE edema bilaterally   Neurological    * Mental status  MOCA =      - Orientation  Oriented to person, place, time, and situation     - Memory   Intact recent and remote     - Attention/concentration  Attentive, vigilant during exam     - Language  Naming & repetition intact, +2-step commands     - Fund of knowledge  Aware of current events     - Executive  Well-organized thoughts     - Other     * Cranial nerves       - CN II  PERRL, visual fields full to confrontation      - CN III, IV, VI  Extraocular movements full, normal pursuits and saccades     - CN V  Sensation V1 - V3 intact     - CN VII  Face strong and symmetric bilaterally     - CN VIII  Hearing intact bilaterally     - CN IX, X  Palate raises midline and symmetric     - CN XI  SCM and trapezius 5/5 bilaterally     - CN XII  Tongue midline   * Motor  Muscle bulk normal, strength 5/5 throughout   * Sensory   Intact to temperature    * Coordination  No dysmetria with finger-to-nose or heel-to-shin   * Gait  See below.   * Deep tendon reflexes  3+ palellae b/l  Hoffmans NEG   * Specialized movement exam  No hypophonic speech, very pleasant, appropriately animated    No facial masking.   No cogwheel rigidity     Mild wide-based gait   No shortened stride length.   No abnormal arm swing.     No postural instability.              Tremor Exam   Arms extended Arms in wing position, fingers almost touching Re-emergent Arms extended wrists extended Intention Resting Kinetic   Left ?++jwrky ? ? ? ?+ ? ?+   Right ?+jerky ? ? ? ? ? ?   Head tremor: No-No   Vocal Tremor on EEEs and AAAs: POS  Leg tremor: POS      Archimedes Spirals   Left ?++   Right ?+         _______________________________________  Procedure:  DBS MRI Compatibility INFO  07/16/2024    IPG Model(s) C27259   Serial No. [id number]   Impedance OK   Battery  OK   Pocket Adapter  NO     DBS Notes  L VIM can cause dysartrhia 1a and c especially  ALSO  9- R VIM causes dyarthria  Initial          Changed A and C to remove stim induced ataxia  Which can come from both l and R VIM  Speech is clearer immediately    FINAL          Initial        Programming  Made A, C, D    FINAL          -------------------  Initial      Monopolar review  Right VIM  Pulse width set at 60; Frequency set at 160  left VIM Monopolar review  C&11 @ 3.5mA no SFX mod tremor control  C&10 @ 3.5mA no SFX mod tremor control  C&9 @ 2.5 fleeting parasthesias mod tremor control BEST  C&8 @ 1.0mA - brief  "paraesthesias better typing   @2.0mA - brief paraesthesias better typing    Programming  Made interleaving to avoid speech slot for L hand        ================  "Monopolar Review  LEFT  Pulse width set at 60; Frequency set at 160  left VIM Monopolar review  C&3 @3.0 no SFX  C&2 @3.0mA speech slur  C&1 @1.0 tingling R arm   @1.4 speech changed  C&0 @1mA pulling and tingling leg and arm    Activated   C+  60/160  2a - @3.0 no tingling  2b - @3.0 no tingling  2c - @3.0 light tingling    Made 3 groups - avoid deep contacts  FINAL  A omni directional  B  C            Laboratory/Radiological:  - Results:  No visits with results within 3 Month(s) from this visit.   Latest known visit with results is:   Admission on 07/11/2023, Discharged on 07/12/2023   Component Date Value Ref Range Status    WBC 07/11/2023 7.92  3.90 - 12.70 K/uL Final    RBC 07/11/2023 4.72  4.00 - 5.40 M/uL Final    Hemoglobin 07/11/2023 13.6  12.0 - 16.0 g/dL Final    Hematocrit 07/11/2023 41.5  37.0 - 48.5 % Final    MCV 07/11/2023 88  82 - 98 fL Final    MCH 07/11/2023 28.8  27.0 - 31.0 pg Final    MCHC 07/11/2023 32.8  32.0 - 36.0 g/dL Final    RDW 07/11/2023 13.2  11.5 - 14.5 % Final    Platelets 07/11/2023 224  150 - 450 K/uL Final    MPV 07/11/2023 11.7  9.2 - 12.9 fL Final    Immature Granulocytes 07/11/2023 0.3  0.0 - 0.5 % Final    Gran # (ANC) 07/11/2023 4.2  1.8 - 7.7 K/uL Final    Immature Grans (Abs) 07/11/2023 0.02  0.00 - 0.04 K/uL Final    Lymph # 07/11/2023 2.6  1.0 - 4.8 K/uL Final    Mono # 07/11/2023 0.8  0.3 - 1.0 K/uL Final    Eos # 07/11/2023 0.3  0.0 - 0.5 K/uL Final    Baso # 07/11/2023 0.06  0.00 - 0.20 K/uL Final    nRBC 07/11/2023 0  0 /100 WBC Final    Gran % 07/11/2023 53.2  38.0 - 73.0 % Final    Lymph % 07/11/2023 32.4  18.0 - 48.0 % Final    Mono % 07/11/2023 9.6  4.0 - 15.0 % Final    Eosinophil % 07/11/2023 3.7  0.0 - 8.0 % Final    Basophil % 07/11/2023 0.8  0.0 - 1.9 % Final    Differential Method 07/11/2023 " Automated   Final    aPTT 07/11/2023 28.2  21.0 - 32.0 sec Final    Prothrombin Time 07/11/2023 11.1  9.0 - 12.5 sec Final    INR 07/11/2023 1.0  0.8 - 1.2 Final    Group & Rh 07/11/2023 A NEG   Final    Indirect Danitza 07/11/2023 NEG   Final    Specimen Outdate 07/11/2023 07/14/2023 23:59   Final         Problem List Items Addressed This Visit          Neuro    Essential tremor - Primary    Current Assessment & Plan     Long[place] ET-like tremor D/L DBS VIM  At times dystonic pulling R hand and possibly neck typical to ET  ETOH responsive    Appears to have stim induced ataxia  Today re reprogrammed A and C to decrease this possibility  Her speech is clearer    Good tremor control     She knows to turn DBS OFF at night and ON in the morning              I spent 45 minutes programming DBS        Zainab Shea MD, MS  [facility]  Department of Neurology  Movement Disorders

## 2024-07-18 ENCOUNTER — PATIENT MESSAGE (OUTPATIENT)
Dept: NEUROLOGY | Facility: CLINIC | Age: 69
End: 2024-07-18
Payer: MEDICARE

## 2024-10-04 ENCOUNTER — OFFICE VISIT (OUTPATIENT)
Dept: NEUROLOGY | Facility: CLINIC | Age: 69
End: 2024-10-04
Payer: MEDICARE

## 2024-10-04 DIAGNOSIS — G25.0 ESSENTIAL TREMOR: Primary | ICD-10-CM

## 2024-10-04 NOTE — PROGRESS NOTES
The patient location is: HOME  The chief complaint leading to visit is: ET  1. Essential tremor          Visit type: Virtual visit with synchronous audio and video    Face to Face time with patient: 40mins  40 minutes of total time spent on the encounter, which includes face to face time and non-face to face time preparing to see the patient (eg, review of tests), Obtaining and/or reviewing separately obtained history, Documenting clinical information in the electronic or other health record, Independently interpreting results (not separately reported) and communicating results to the patient/family/caregiver, or Care coordination (not separately reported).     Each patient to whom he or she provides medical services by telemedicine is:  (1) informed of the relationship between the physician and patient and the respective role of any other health care provider with respect to management of the patient; and (2) notified that he or she may decline to receive medical services by telemedicine and may withdraw from such care at any time.      Movement Disorders -   Name: Anna Wagoner  MRN: [id number]   CSN: [id number]      Date: 10/04/2024    Referring physician:  No referring provider defined for this encounter.    Chief Complaint: tremor     Interval Hx  B.L VIM DBS    Interval Hx    Since last visit,   Happy with tremor control  Has not changes DBS settings  B and C were made  Remains on A  Parasthesias when she turns it on in the AM  Changes made to decrease stim induced ataxia    C causes mild L head  B causes mild L hand tingling fleeting    Continues to turn DBS OFF at night  Stopped lyrica    Previous  Interval Hx  Since last visit with RR, comes for evaluation for DBS  R handed woman.  Tried the CHERI trio which did not help.  He reports a b/l hand tremor since 9-13yo slowly progressive over her lifetime L>R in the last few years. Started to have a vocal tremor and head tremor in the last years. At this point  she can no longer put on makeup, cannot write. At times drops items. Struggles to type and double types.    Since several year her R hand may cramp and fingers draw up.  At times feet cramp.  Chronic neck strain. Mentions she hs spinal stenosis but no record of MRI documenting.  Takes zanaflex 8mg QHS.  Had hallucinations on propranolol    Reports had a DATscan 2021 - [place]    At times unsteady gait. Falls once a year.  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 Some word-finding and repeating asking for tasks.    Son has a tremor  Otherwise no fam hx tremor    ----------------  Prior  Interval History:  - luigi-trio 60 day trial -- did not notice any improvement with the tremor  - L hand is a little worse, some change in direction   - notices it when holding things   - drops things with her R hand   - now off of phentermine   - feels that tremors slowed down with phentermine and even more with the increased dose   - drinks one cup of coffee in the morning    - tremor improves with etoh   - chronic headaches/migraines -- interested in seeing headache specialist here         From July 2022: Anna Wagoner is a R HANDED 69 y.o. female with a medical issues significant for HTN, HLD, hypothyroidism (hashimotos), and anxiety who presents for tremors. Referral from [facility]. Accompanied by daughter. Previously on primidone which she felt caused weight gain and constipation. No longer on it. She tried gabapentin and topamax in the past but stop due to cognitive side effects.  Propranolol caused nightmares and hallucinations?   She has had the tremors since she was at least 11 or 12 years old. Worse on the L hand now, feels that the tremor is progressing. Notices tremor whenever she is typing as well as whenever she is holding something. It does affect her handwriting. Tremor improves with EtOH. She is in a facebook ET group. She works full-time in accounting. Plans to work for 5 more  years.   Primidone helped the tremor but after a few weeks didn't find it as effective. Max dose was 100 mg BID. Felt that she was gaining weight on primidone. Does not think that this was because of thyroid issues. Per chart review, clonazepam caused daytime lethargy but she does not recall this. She asks about medications that she can take during the day.   Very sensitive to side effects of medications.   Currently taking phentermine right now for weight loss. Learning how to eat different. Years ago she tried topamax but caused cognitive side effects.    Not interested in DBS or focused ultrasound.   Asks about BPPV, sees ENT in [place].       From outside neurology provider note  67 y.o. female presents today for neurological follow up. Last seen in follow up with Dr. Desai in January 2022, when primidone was prescribed given findings most consistent with essential tremor. She does have an essential tremor (both limbs and voice) which she has had since about age 11. Tremor more noticeable in left upper extremity than right. Requests referral to movement specialist. Scheduled to see weight-loss specialist given weight gain she attributes to Primidone.   Neurocognitive status: Neurocognitive function has remained stable.   Current pertinent medications:Primidone triggered weight gain and constipation, so she has discontinued. She tried gabapentin and topamax, but discontinued due to cognitive side effects. Klonopin prn triggered daytime lethargy. Propranolol triggered nightmares and hallucinations.         Family History: none     Neuroleptic Exposure: none        Review of Systems:   Review of Systems   Constitutional:  Negative for chills, fever and malaise/fatigue.   HENT:  Negative for hearing loss.    Eyes:  Negative for blurred vision and double vision.   Respiratory:  Negative for cough, shortness of breath and stridor.    Cardiovascular:  Negative for chest pain and leg swelling.   Gastrointestinal:   Negative for constipation, diarrhea and nausea.   Genitourinary:  Negative for frequency and urgency.   Musculoskeletal:  Negative for falls.   Skin:  Negative for itching and rash.   Neurological:  Positive for tremors. Negative for dizziness, loss of consciousness and weakness.   Psychiatric/Behavioral:  Negative for hallucinations and memory loss.            Past Medical History: The patient  has a past medical history of Anxiety, Back pain, Hashimoto's thyroiditis, Hypertension, and Neck pain, chronic.    Social History: The patient  reports that she has quit smoking. Her smoking use included cigarettes. She has never been exposed to tobacco smoke. She has never used smokeless tobacco.    Family History: Their family history includes Tremor in her mother.    Allergies: Metoprolol succinate     Meds:   Current Outpatient Medications on File Prior to Visit   Medication Sig Dispense Refill    buPROPion (WELLBUTRIN XL) 300 MG 24 hr tablet Take 300 mg by mouth daily as needed.      butalbital-acetaminophen-caff -40 mg Cap       carvediloL (COREG) 12.5 MG tablet Take 12.5 mg by mouth 2 (two) times daily.      cholecalciferol, vitamin D3, 75 mcg (3,000 unit) Tab Take 1 tablet by mouth once daily.      co-enzyme Q-10 30 mg capsule Take 30 mg by mouth once daily.      DULoxetine (CYMBALTA) 20 MG capsule Take 20 mg by mouth once daily.      ergocalciferol (ERGOCALCIFEROL) 50,000 unit Cap Take 50,000 Units by mouth every 7 days.      famotidine (PEPCID) 20 MG tablet Take 20 mg by mouth nightly.      ibuprofen (ADVIL,MOTRIN) 800 MG tablet Take 800 mg by mouth 2 (two) times daily as needed.      levothyroxine (SYNTHROID) 112 MCG tablet Take 112 mcg by mouth.      liothyronine (CYTOMEL) 5 MCG Tab Take 5 mcg by mouth.      ondansetron (ZOFRAN-ODT) 4 MG TbDL Dissolve 1 tablet (4 mg total) by mouth every 8 (eight) hours as needed (nausea). 30 tablet 0    pantoprazole (PROTONIX) 40 MG tablet Take 40 mg by mouth.       "rosuvastatin (CRESTOR) 10 MG tablet Take 10 mg by mouth every evening.      tiZANidine (ZANAFLEX) 4 MG tablet Take 4 mg by mouth nightly.       Current Facility-Administered Medications on File Prior to Visit   Medication Dose Route Frequency Provider Last Rate Last Admin    lactated ringers infusion   Intravenous Continuous Fabienne Donaldson MD        LIDOcaine (PF) 10 mg/ml (1%) injection 10 mg  1 mL Intradermal Once Fabienne Donaldson MD           Exam:  There were no vitals taken for this visit.    Physical Exam  Constitutional: Well-developed, well-nourished, appears stated age  Eyes: No scleral icterus  ENT: Moist oral mucosa  Cardiovascular: No lower extremity edema   Respiratory: No labored breathing   Skin: No rash   Hematologic: No bruising    Other: GI/ deferred   Mental status: Alert and oriented to person, place, time, and situation;   follows commands  Speech: normal (not dysarthric), no aphasia  Cranial nerves:            CN II: Pupils mid-position and equal, not tested light or accommodation  CN III, IV, VI: Extraocular movements full, no nystagmus visualized  CN V: Not tested   CN VII: Face strong and symmetric bilaterally   CN VIII: Hearing intact to voice and conversation   CN IX, X: Palate raises midline and symmetric   CN XI: Strong shoulder shrug B/L  CN XII: Tongue appears midline   Motor: Normal bulk by appearance, no drift   Sensory: Not tested    Gait: Not tested  Deep tendon reflexes: Not tested  Movement/Coordination                    No hypophonic speech.                     No facial masking.  No bradykinesia.      Tremor Exam   Arms extended Arms in wing position, fingers almost touching Re-emergent Arms extended wrists extended Intention Resting Kinetic   Left ?++jwrky ? ? ? ?+ ? ?+   Right ?+jerky ? ? ? ? ? ?   Head tremor: No-No   Vocal Tremor on EEEs and AAAs: POS  Leg tremor: POS      Archimedes Spirals   Left ?++   Right ?+         "  Constitutional  Well-developed, well-nourished, appears " stated age   Ophthalmoscopic  No papilledema with no hemorrhages or exudates bilaterally   Cardiovascular  Radial pulses 2+ and symmetric, no LE edema bilaterally   Neurological    * Mental status  MOCA =      - Orientation  Oriented to person, place, time, and situation     - Memory   Intact recent and remote     - Attention/concentration  Attentive, vigilant during exam     - Language  Naming & repetition intact, +2-step commands     - Fund of knowledge  Aware of current events     - Executive  Well-organized thoughts     - Other     * Cranial nerves       - CN II  PERRL, visual fields full to confrontation     - CN III, IV, VI  Extraocular movements full, normal pursuits and saccades     - CN V  Sensation V1 - V3 intact     - CN VII  Face strong and symmetric bilaterally     - CN VIII  Hearing intact bilaterally     - CN IX, X  Palate raises midline and symmetric     - CN XI  SCM and trapezius 5/5 bilaterally     - CN XII  Tongue midline   * Motor  Muscle bulk normal, strength 5/5 throughout   * Sensory   Intact to temperature    * Coordination  No dysmetria with finger-to-nose or heel-to-shin   * Gait  See below.   * Deep tendon reflexes  3+ palellae b/l  Hoffmans NEG   * Specialized movement exam  No hypophonic speech, very pleasant, appropriately animated    No facial masking.   No cogwheel rigidity     Mild wide-based gait   No shortened stride length.   No abnormal arm swing.     No postural instability.              Tremor Exam   Arms extended Arms in wing position, fingers almost touching Re-emergent Arms extended wrists extended Intention Resting Kinetic   Left ?++jwrky ? ? ? ?+ ? ?+   Right ?+jerky ? ? ? ? ? ?   Head tremor: No-No   Vocal Tremor on EEEs and AAAs: POS  Leg tremor: POS      Archimedes Spirals   Left ?++   Right ?+         _______________________________________  Procedure:    10/04/2024  Changed A to B and C  B and C caused fleeting parsthesias  No change to gait      DBS MRI Compatibility  "INFO      IPG Model(s) N29814   Serial No. [id number]   Impedance    Battery     Pocket Adapter  NO     DBS Notes  L VIM can cause dysartrhia 1a and c especially  ALSO  9- R VIM causes dyarthria    Prior  Initial          Changed A and C to remove stim induced ataxia  Which can come from both l and R VIM  Speech is clearer immediately    FINAL          Initial        Programming  Made A, C, D    FINAL          -------------------  Initial      Monopolar review  Right VIM  Pulse width set at 60; Frequency set at 160  left VIM Monopolar review  C&11 @ 3.5mA no SFX mod tremor control  C&10 @ 3.5mA no SFX mod tremor control  C&9 @ 2.5 fleeting parasthesias mod tremor control BEST  C&8 @ 1.0mA - brief paraesthesias better typing   @2.0mA - brief paraesthesias better typing    Programming  Made interleaving to avoid speech slot for L hand        ================  "Monopolar Review  LEFT  Pulse width set at 60; Frequency set at 160  left VIM Monopolar review  C&3 @3.0 no SFX  C&2 @3.0mA speech slur  C&1 @1.0 tingling R arm   @1.4 speech changed  C&0 @1mA pulling and tingling leg and arm    Activated   C+  60/160  2a - @3.0 no tingling  2b - @3.0 no tingling  2c - @3.0 light tingling    Made 3 groups - avoid deep contacts  FINAL  A omni directional  B  C            Laboratory/Radiological:  - Results:  No visits with results within 3 Month(s) from this visit.   Latest known visit with results is:   Admission on 07/11/2023, Discharged on 07/12/2023   Component Date Value Ref Range Status    WBC 07/11/2023 7.92  3.90 - 12.70 K/uL Final    RBC 07/11/2023 4.72  4.00 - 5.40 M/uL Final    Hemoglobin 07/11/2023 13.6  12.0 - 16.0 g/dL Final    Hematocrit 07/11/2023 41.5  37.0 - 48.5 % Final    MCV 07/11/2023 88  82 - 98 fL Final    MCH 07/11/2023 28.8  27.0 - 31.0 pg Final    MCHC 07/11/2023 32.8  32.0 - 36.0 g/dL Final    RDW 07/11/2023 13.2  11.5 - 14.5 % Final    Platelets 07/11/2023 224  150 - 450 K/uL Final    MPV 07/11/2023 " 11.7  9.2 - 12.9 fL Final    Immature Granulocytes 07/11/2023 0.3  0.0 - 0.5 % Final    Gran # (ANC) 07/11/2023 4.2  1.8 - 7.7 K/uL Final    Immature Grans (Abs) 07/11/2023 0.02  0.00 - 0.04 K/uL Final    Lymph # 07/11/2023 2.6  1.0 - 4.8 K/uL Final    Mono # 07/11/2023 0.8  0.3 - 1.0 K/uL Final    Eos # 07/11/2023 0.3  0.0 - 0.5 K/uL Final    Baso # 07/11/2023 0.06  0.00 - 0.20 K/uL Final    nRBC 07/11/2023 0  0 /100 WBC Final    Gran % 07/11/2023 53.2  38.0 - 73.0 % Final    Lymph % 07/11/2023 32.4  18.0 - 48.0 % Final    Mono % 07/11/2023 9.6  4.0 - 15.0 % Final    Eosinophil % 07/11/2023 3.7  0.0 - 8.0 % Final    Basophil % 07/11/2023 0.8  0.0 - 1.9 % Final    Differential Method 07/11/2023 Automated   Final    aPTT 07/11/2023 28.2  21.0 - 32.0 sec Final    Prothrombin Time 07/11/2023 11.1  9.0 - 12.5 sec Final    INR 07/11/2023 1.0  0.8 - 1.2 Final    Group & Rh 07/11/2023 A NEG   Final    Indirect Danitza 07/11/2023 NEG   Final    Specimen Outdate 07/11/2023 07/14/2023 23:59   Final         Problem List Items Addressed This Visit          Neuro    Essential tremor - Primary    Current Assessment & Plan     [place] ET-like tremor D/L DBS VIM  At times dystonic pulling R hand and possibly neck typical to ET  ETOH responsive    Appears to have stim induced ataxia  Today asked her to try settings B and C for 1 week each to see if mild ataxia fades    Good tremor control on all settings A B C tried today    She knows to turn DBS OFF at night and ON in the morning                I spent 30 minutes programming DBS        Zainab Shea MD, MS  [facility]  Department of Neurology  Movement Disorders

## 2024-10-04 NOTE — ASSESSMENT & PLAN NOTE
[place] ET-like tremor D/L DBS VIM  At times dystonic pulling R hand and possibly neck typical to ET  ETOH responsive    Appears to have stim induced ataxia  Today asked her to try settings B and C for 1 week each to see if mild ataxia fades    Good tremor control on all settings A B C tried today    She knows to turn DBS OFF at night and ON in the morning

## 2024-10-31 ENCOUNTER — PATIENT MESSAGE (OUTPATIENT)
Dept: PAIN MEDICINE | Facility: CLINIC | Age: 69
End: 2024-10-31
Payer: MEDICARE

## 2024-10-31 DIAGNOSIS — M47.816 LUMBAR SPONDYLOSIS: Primary | ICD-10-CM

## 2024-11-18 ENCOUNTER — HOSPITAL ENCOUNTER (OUTPATIENT)
Facility: HOSPITAL | Age: 69
Discharge: HOME | End: 2024-11-18
Attending: STUDENT IN AN ORGANIZED HEALTH CARE EDUCATION/TRAINING PROGRAM | Admitting: STUDENT IN AN ORGANIZED HEALTH CARE EDUCATION/TRAINING PROGRAM
Payer: MEDICARE

## 2024-11-18 DIAGNOSIS — M47.896 OTHER SPONDYLOSIS, LUMBAR REGION: ICD-10-CM

## 2024-11-18 DIAGNOSIS — M47.817 SPONDYLOSIS OF LUMBOSACRAL REGION WITHOUT MYELOPATHY OR RADICULOPATHY: Primary | ICD-10-CM

## 2024-11-18 PROCEDURE — 64636 DESTROY L/S FACET JNT ADDL: CPT | Mod: 50 | Performed by: STUDENT IN AN ORGANIZED HEALTH CARE EDUCATION/TRAINING PROGRAM

## 2024-11-18 PROCEDURE — 63600175 PHARM REV CODE 636 W HCPCS: Mod: JZ,JG | Performed by: STUDENT IN AN ORGANIZED HEALTH CARE EDUCATION/TRAINING PROGRAM

## 2024-11-18 PROCEDURE — 64636 DESTROY L/S FACET JNT ADDL: CPT | Mod: 50,,, | Performed by: STUDENT IN AN ORGANIZED HEALTH CARE EDUCATION/TRAINING PROGRAM

## 2024-11-18 PROCEDURE — 99152 MOD SED SAME PHYS/QHP 5/>YRS: CPT | Performed by: STUDENT IN AN ORGANIZED HEALTH CARE EDUCATION/TRAINING PROGRAM

## 2024-11-18 PROCEDURE — 99153 MOD SED SAME PHYS/QHP EA: CPT | Performed by: STUDENT IN AN ORGANIZED HEALTH CARE EDUCATION/TRAINING PROGRAM

## 2024-11-18 PROCEDURE — 64635 DESTROY LUMB/SAC FACET JNT: CPT | Mod: 50 | Performed by: STUDENT IN AN ORGANIZED HEALTH CARE EDUCATION/TRAINING PROGRAM

## 2024-11-18 PROCEDURE — 64635 DESTROY LUMB/SAC FACET JNT: CPT | Mod: 50,,, | Performed by: STUDENT IN AN ORGANIZED HEALTH CARE EDUCATION/TRAINING PROGRAM

## 2024-11-18 RX ORDER — LIDOCAINE HYDROCHLORIDE 10 MG/ML
1 INJECTION, SOLUTION EPIDURAL; INFILTRATION; INTRACAUDAL; PERINEURAL ONCE
Status: COMPLETED | OUTPATIENT
Start: 2024-11-18 | End: 2024-11-18

## 2024-11-18 RX ORDER — FENTANYL CITRATE 50 UG/ML
INJECTION, SOLUTION INTRAMUSCULAR; INTRAVENOUS
Status: DISCONTINUED | OUTPATIENT
Start: 2024-11-18 | End: 2024-11-18 | Stop reason: HOSPADM

## 2024-11-18 RX ORDER — MIDAZOLAM HYDROCHLORIDE 1 MG/ML
INJECTION INTRAMUSCULAR; INTRAVENOUS
Status: DISCONTINUED | OUTPATIENT
Start: 2024-11-18 | End: 2024-11-18 | Stop reason: HOSPADM

## 2024-11-18 RX ORDER — DEXAMETHASONE SODIUM PHOSPHATE 10 MG/ML
INJECTION INTRAMUSCULAR; INTRAVENOUS
Status: DISCONTINUED | OUTPATIENT
Start: 2024-11-18 | End: 2024-11-18 | Stop reason: HOSPADM

## 2024-11-18 RX ORDER — BUPIVACAINE HYDROCHLORIDE 2.5 MG/ML
INJECTION, SOLUTION EPIDURAL; INFILTRATION; INTRACAUDAL
Status: DISCONTINUED | OUTPATIENT
Start: 2024-11-18 | End: 2024-11-18 | Stop reason: HOSPADM

## 2024-11-18 RX ORDER — ONDANSETRON HYDROCHLORIDE 2 MG/ML
4 INJECTION, SOLUTION INTRAVENOUS ONCE
Status: COMPLETED | OUTPATIENT
Start: 2024-11-18 | End: 2024-11-18

## 2024-11-18 RX ORDER — SODIUM CHLORIDE, SODIUM LACTATE, POTASSIUM CHLORIDE, CALCIUM CHLORIDE 600; 310; 30; 20 MG/100ML; MG/100ML; MG/100ML; MG/100ML
INJECTION, SOLUTION INTRAVENOUS CONTINUOUS
Status: DISCONTINUED | OUTPATIENT
Start: 2024-11-18 | End: 2024-11-18 | Stop reason: HOSPADM

## 2024-11-18 RX ORDER — LIDOCAINE HYDROCHLORIDE 10 MG/ML
INJECTION, SOLUTION INFILTRATION; PERINEURAL
Status: DISCONTINUED | OUTPATIENT
Start: 2024-11-18 | End: 2024-11-18 | Stop reason: HOSPADM

## 2024-11-18 RX ADMIN — ONDANSETRON 4 MG: 2 INJECTION INTRAMUSCULAR; INTRAVENOUS at 07:11

## 2024-11-18 RX ADMIN — LIDOCAINE HYDROCHLORIDE 5 MG: 10 SOLUTION INTRAVENOUS at 07:11

## 2024-11-18 NOTE — DISCHARGE SUMMARY
[facility] - Periop Services  Discharge Note  Short Stay    Procedure(s) (LRB):  Radiofrequency Ablation, Nerve, Spinal, Lumbar, Medial Branch, 1 Level l4/5 and l5/s1 RFA ( fiest case if possibale ) (Bilateral)      OUTCOME: Patient tolerated treatment/procedure well without complication and is now ready for discharge.    DISPOSITION: Home or Self Care    FINAL DIAGNOSIS:  <principal problem not specified>    FOLLOWUP: In clinic    DISCHARGE INSTRUCTIONS:    Discharge Procedure Orders   Notify your health care provider if you experience any of the following:  temperature >100.4     Notify your health care provider if you experience any of the following:  severe uncontrolled pain     Notify your health care provider if you experience any of the following:  redness, tenderness, or signs of infection (pain, swelling, redness, odor or green/yellow discharge around incision site)     Activity as tolerated        TIME SPENT ON DISCHARGE: 20 minutes

## 2024-11-18 NOTE — H&P
CC: back pain    HPI: The patient is a 69 y.o. female with a history of back pain here for B RFA L4-5 and L5-S1. There are no major changes in history and physical from 4/5/24 by Dr. Donaldson.    Past Medical History:   Diagnosis Date    Anxiety     Back pain     Hashimoto's thyroiditis     Hypertension     Neck pain, chronic     Sleep apnea     CPAP       Past Surgical History:   Procedure Laterality Date    BREAST SURGERY      CHOLECYSTECTOMY      DEEP BRAIN STIMULATOR PLACEMENT Left 4/18/2023    Procedure: INSERTION, DEEP BRAIN STIMULATOR;  Surgeon: Bonifacio Kraft MD;  Location: [facility] OR 2ND FLR;  Service: Neurosurgery;  Laterality: Left;  INSERTION ELECTRODES FOR DEEP BRAIN STIMULATION/ BILATERAL VENTRALIS INTERMEDIUS/ LEFT SIDE FIRST, RIGHT SIDE SECOND/ TEDS & SCD/ [facility]TR[facility], SACHI MENCHACA.    DEEP BRAIN STIMULATOR PLACEMENT Right 7/11/2023    Procedure: INSERTION, DEEP BRAIN STIMULATOR;  Surgeon: Bonifacio Kraft MD;  Location: [facility] OR 2ND FLR;  Service: Neurosurgery;  Laterality: Right;  INSERT ELECTRODE FOR DEEP BRAIN STIMULATION/RIGHT VENTRALIS INTERMEDIUS/ TEDS & SCD/[facility]TRONICS, PAPITO MORALES.    HYSTERECTOMY      INJECTION OF ANESTHETIC AGENT AROUND MEDIAL BRANCH NERVES INNERVATING LUMBAR FACET JOINT Bilateral 12/18/2023    Procedure: Block-nerve-medial branch-lumbar;  Surgeon: Fabienne Donaldson MD;  Location: [facility] OR;  Service: Anesthesiology;  Laterality: Bilateral;  L4/5 and l5/s1 MBB    INJECTION OF ANESTHETIC AGENT AROUND MEDIAL BRANCH NERVES INNERVATING LUMBAR FACET JOINT Bilateral 1/22/2024    Procedure: Block-nerve-medial branch-lumbar;  Surgeon: Fabienne Donaldson MD;  Location: [facility] OR;  Service: Anesthesiology;  Laterality: Bilateral;  L4/5 and l5/s1 MBB #2    INSERTION OF DEEP BRAIN STIMULATOR GENERATOR Left 4/25/2023    Procedure: INSERTION, PULSE GENERATOR, DEEP BRAIN STIMULATOR;  Surgeon: Bonifacio Kraft MD;  Location: [facility] OR 2ND FLR;  Service: Neurosurgery;  Laterality: Left;  INSERT PULSE GENERATOR  FOR DEEP BRAIN STIMULATION/TEDS & SCD/ [facility], SACHI MENCHACA.    PLACEMENT OF FIDUCIAL SCREW INTO SPINE N/A 4/18/2023    Procedure: INSERTION, FIDUCIAL SCREW, SKULL/DBS;  Surgeon: Bonifacio Kraft MD;  Location: [facility] OR 2ND FLR;  Service: Neurosurgery;  Laterality: N/A;  APPLICATION OF FIDUCIALS FOR DEEP BRAIN STIMULATION/ TEDS & SCD/ [facility]TR[facility], SACHI MENCHACA.    PLACEMENT OF FIDUCIAL SCREW INTO SPINE N/A 7/11/2023    Procedure: INSERTION, FIDUCIAL SCREW, SKULL/DBS;  Surgeon: Bonifacio Kraft MD;  Location: [facility] OR 2ND FLR;  Service: Neurosurgery;  Laterality: N/A;  INSERTION FIDUCIALS FOR DEEP BRAIN STIMULATION/ TEDS & SCD/ MEDTRONICS, PAPITO MORALES.    RADIOFREQUENCY ABLATION OF LUMBAR MEDIAL BRANCH NERVE AT SINGLE LEVEL Bilateral 2/12/2024    Procedure: Radiofrequency Ablation, Nerve, Spinal, Lumbar, Medial Branch, 1 Level;  Surgeon: Fabienne Donaldson MD;  Location: [facility] OR;  Service: Anesthesiology;  Laterality: Bilateral;  L4/5 and L5/s1 ( first or 2nd case please)    TONSILLECTOMY         Family History   Problem Relation Name Age of Onset    Tremor Mother         Social History     Socioeconomic History    Marital status:    Tobacco Use    Smoking status: Former     Types: Cigarettes     Passive exposure: Never    Smokeless tobacco: Never     Social Drivers of Health     Financial Resource Strain: Low Risk  (10/8/2024)    Received from [facility]    Overall Financial Resource Strain (CARDIA)     Difficulty of Paying Living Expenses: Not hard at all   Food Insecurity: No Food Insecurity (10/8/2024)    Received from [facility]    Hunger Vital Sign     Worried About Running Out of Food in the Last Year: Never true     Ran Out of Food in the Last Year: Never true   Transportation Needs: No Transportation Needs (10/8/2024)    Received from [facility]    PRAPARE - Transportation     Lack of  "Transportation (Medical): No     Lack of Transportation (Non-Medical): No   Physical Activity: Inactive (10/8/2024)    Received from [facility]    Exercise Vital Sign     Days of Exercise per Week: 0 days     Minutes of Exercise per Session: 0 min   Stress: No Stress Concern Present (10/8/2024)    Received from [facility] and [facility] - Occupational Stress Questionnaire     Feeling of Stress : Not at all   Recent Concern: Stress - Stress Concern Present (7/16/2024)    [facility] - Occupational Stress Questionnaire     Feeling of Stress : To some extent   Housing Stability: Low Risk  (10/8/2024)    Received from [facility] and [facility]    Housing Stability Vital Sign     Unable to Pay for Housing in the Last Year: No     Number of Times Moved in the Last Year: 1     Homeless in the Last Year: No       Current Facility-Administered Medications   Medication Dose Route Frequency Provider Last Rate Last Admin    lactated ringers infusion   Intravenous Continuous Fabienne Donaldson MD        LIDOcaine (PF) 10 mg/ml (1%) injection 10 mg  1 mL Intradermal Once Fabienne Donaldson MD        ondansetron injection 4 mg  4 mg Intravenous Once Fabienne Donaldson MD         Facility-Administered Medications Ordered in Other Encounters   Medication Dose Route Frequency Provider Last Rate Last Admin    lactated ringers infusion   Intravenous Continuous Fabienne Donaldson MD        LIDOcaine (PF) 10 mg/ml (1%) injection 10 mg  1 mL Intradermal Once Fabienne Donaldson MD           Review of patient's allergies indicates:   Allergen Reactions    Metoprolol succinate Hives and Itching     Hives - wheals       Vitals:    11/13/24 1023 11/18/24 0736   BP:  106/62   Pulse:  78   Resp:  19   Temp:  98.1 °F (36.7 °C)   TempSrc:  Skin   SpO2:  96%   Weight: 97.5 kg (214 lb 15.2 oz)    Height: 5' 3" (1.6 m)        REVIEW " OF SYSTEMS:     GENERAL: No weight loss, malaise or fevers.  HEENT:  No recent changes in vision or hearing  NECK: Negative for lumps, no difficulty with swallowing.  RESPIRATORY: Negative for cough, wheezing or shortness of breath, patient denies any recent URI.  CARDIOVASCULAR: Negative for chest pain, leg swelling or palpitations.  GI: Negative for abdominal discomfort, blood in stools or black stools or change in bowel habits.  MUSCULOSKELETAL: See HPI.  SKIN: Negative for lesions, rash, and itching.  PSYCH: No suicidal or homicidal ideations, no current mood disturbances.  HEMATOLOGY/LYMPHOLOGY: Negative for prolonged bleeding, bruising easily or swollen nodes. Patient is not currently taking any anti-coagulants  ENDO: No history of diabetes or thyroid dysfunction  NEURO: No history of syncope, paralysis, seizures or tremors.All other reviewed and negative other than HPI.    Physical exam:  Gen: A and O x3, pleasant, well-groomed  Skin: No rashes or obvious lesions  HEENT: PERRLA, no obvious deformities on ears or in canals. No thyroid masses, trachea midline, no palpable lymph nodes in neck, axilla.  CVS: Regular rate and rhythm, normal S1 and S2, no murmurs.  Resp: Clear to auscultation bilaterally.  Abdomen: Soft, NT/ND, normal bowel sounds present.  Musculoskeletal/Neuro: Moving all extremities    Assessment:  Spondylosis of lumbosacral region without myelopathy or radiculopathy  -     Place in Outpatient; Standing  -     Vital signs; Standing  -     Verify informed consent; Standing  -     Notify physician ; Standing  -     Notify physician ; Standing  -     Notify physician (specify); Standing  -     Diet NPO; Standing    Other spondylosis, lumbar region    Other orders  -     LIDOcaine (PF) 10 mg/ml (1%) injection 10 mg  -     lactated ringers infusion  -     IP VTE LOW RISK PATIENT; Standing  -     FL Fluoro for Pain Management; Standing  -     ondansetron injection 4 mg          PLAN: B RFA L4-5 and  L5-S1      This patient has been cleared for surgery in an ambulatory surgical facility    ASA 3,  Mallampatti Score 3  No history of anesthetic complications  Plan for RN IV sedation

## 2024-11-18 NOTE — OP NOTE
Patient: Anna Wagoner                                                    MRN: [id number]  : 1955                                              Date of procedure: 2024      Pre Procedure Diagnosis: Low back pain, Lumbago, Lumbar spondylosis without myelopathy/ lumbrosacral region    Post Procedure Diagnosis: Same    Procedure: Bilateral Lumbar Medial Branch Nerve Rhizotomy for  L4-5 and L5-S1 joints under Fluoroscopy     Attending: Fabienne Donaldson MD    Local Anesthetic Injected: 1% Lidocaine 10 ml    Sedation Medications: See RN note    Estimated Blood Loss: None    Complication: None      Procedure:  After informed consent was obtained, patient was taken to the fluoroscopy suite and placed in a prone position.  The skin was prepped and draped in the usual sterile fashion using chlorhexidine.  Anatomical landmarks were identified with the aid of fluoroscopy in PA and Oblique views, and the skin and subcutaneous tissue were infiltrated with a total of 5mL of 1% Lidocaine via a 25-gauge 1.5inch needle at each of the intended entry sites.  Subsequently, three 22-gauge 100mm 10mm tip introducer needles were advanced with the aid of fluoroscopy such that their tips were located at the respective positions of the superior medial border of the transverse processes with the posterior elements at each level.  Needle placement was confirmed with the aid of fluoroscopy.  Motor stimulation up to 2 Volts at each level confirmed no motor nerve involvement.  Impedance was less than 800 ohms at each level.  1ml of 2% lidocaine was instilled prior to lesioning.  Ablation was performed per level utilizing radiofrequency generator 80°C for 90 seconds. Then 0.5mL of a mixture of 0.5mL of Dexamethasone 10mg/mL and 2mL of 0.25% Bupivacaine was injected at each level.  There were no complications or paresthesias.   Patient tolerated the procedure well and all needles were removed intact.    Patient was observed in recovery and  discharged home with written instruction under supervision in stable condition.

## 2024-11-18 NOTE — PLAN OF CARE
Discharge instructions given to pt/dtr, verbalized understanding.  Tolerating PO fluids.  IV removed.  Denies pain.  Wheeled down to dtr  per RN in no distress.

## 2024-11-19 ENCOUNTER — PATIENT MESSAGE (OUTPATIENT)
Dept: NEUROLOGY | Facility: CLINIC | Age: 69
End: 2024-11-19
Payer: MEDICARE

## 2024-11-19 VITALS
BODY MASS INDEX: 38.09 KG/M2 | SYSTOLIC BLOOD PRESSURE: 110 MMHG | TEMPERATURE: 98 F | RESPIRATION RATE: 17 BRPM | WEIGHT: 214.94 LBS | HEIGHT: 63 IN | DIASTOLIC BLOOD PRESSURE: 57 MMHG | HEART RATE: 74 BPM | OXYGEN SATURATION: 97 %

## 2024-11-21 ENCOUNTER — PATIENT MESSAGE (OUTPATIENT)
Dept: NEUROLOGY | Facility: CLINIC | Age: 69
End: 2024-11-21
Payer: MEDICARE

## 2024-11-26 ENCOUNTER — PATIENT MESSAGE (OUTPATIENT)
Dept: PAIN MEDICINE | Facility: CLINIC | Age: 69
End: 2024-11-26
Payer: MEDICARE

## 2024-12-02 ENCOUNTER — PATIENT MESSAGE (OUTPATIENT)
Dept: NEUROLOGY | Facility: CLINIC | Age: 69
End: 2024-12-02
Payer: MEDICARE

## 2024-12-04 ENCOUNTER — TELEPHONE (OUTPATIENT)
Dept: PAIN MEDICINE | Facility: CLINIC | Age: 69
End: 2024-12-04
Payer: MEDICARE

## 2025-01-07 ENCOUNTER — OFFICE VISIT (OUTPATIENT)
Dept: PAIN MEDICINE | Facility: CLINIC | Age: 70
End: 2025-01-07
Payer: MEDICARE

## 2025-01-07 VITALS — BODY MASS INDEX: 37.92 KG/M2 | HEIGHT: 63 IN | WEIGHT: 214 LBS

## 2025-01-07 DIAGNOSIS — M54.16 LUMBAR RADICULOPATHY: Primary | ICD-10-CM

## 2025-01-07 PROCEDURE — 99999 PR PBB SHADOW E&M-EST. PATIENT-LVL III: CPT | Mod: PBBFAC,,, | Performed by: STUDENT IN AN ORGANIZED HEALTH CARE EDUCATION/TRAINING PROGRAM

## 2025-01-07 PROCEDURE — 99213 OFFICE O/P EST LOW 20 MIN: CPT | Mod: S$PBB,,, | Performed by: STUDENT IN AN ORGANIZED HEALTH CARE EDUCATION/TRAINING PROGRAM

## 2025-01-07 PROCEDURE — 99213 OFFICE O/P EST LOW 20 MIN: CPT | Mod: PBBFAC,PN | Performed by: STUDENT IN AN ORGANIZED HEALTH CARE EDUCATION/TRAINING PROGRAM

## 2025-01-07 NOTE — PROGRESS NOTES
Office Note    Victor Hugo Evans MD      First Office Visit: 11/30/23  Today' Date: 1/7/2025  Last Office Visit: 11/30/23    Chief complaint: back pain     HPI: Pt is a pleasent 69 y.o., who presents for evaluation. Referred by Krystal Helton NP. Pt seen previously for back pain and R leg pain. Pt seen today for f/u from B RFA L4-5 and L5-S1 on 11/18/24. Pt states she has had >50% relief from pain. Endorses new onset R leg pain that happens infrequently. States that when the pain act up, it starts in the right side of the lower back and goes down the front of the R leg to mid thigh. States this happens infrequently currently and is not wanting an MARIA FERNANDA for it right now. Overall has been doing better post injection. No BB changes. Is doing HEP.     Pain score: 0  Pain disability score: 35      Relevant Imaging/ Testing:   MR L-spine 9/23  XR C-spine 3/23  CT C-spine 3/23  MR C-spine 2/23  XR T-spine 7/21  XR L-spine 8/20    Procedures:   B RFA L4-5 and L5-S1 2/12/24, 11/18/24  B MBB L4-5 and L5-S1 12/18/23, 1/22/24      Date of board of pharmacy review:1/7/2025  Date of opioid risk screening/ pain psych: None  Date of opioid agreement and consent: None  Date of urine drug screen: None  Date of random pill count: None     was reviewed today: reviewed, no concerns     Prescribed medications: None    See EHR for  PMH, PSH, FH, SH, Medications and Allergy    ROS:  Positive for pain  ROS     PE:  There were no vitals filed for this visit.    General: Pleasant, no distress  HEENT: NC/ AT. PERRLA  CV: Radial pulses intact  Pulm: No distress  Ext: No edema    Physical Exam     Neuromusculoskeletal:  Head: NC, AT. PERRLA  Neck: Intact range of motions, extension, flexion, rotation. Neg Facet loading. Neg Spurling. Min Tenderness.  5/5 Strength, normal tone  Shoulder: Intact range of motion  Lumbar: Intact range of motion. Pos Facet loading bilaterally. Min Tenderness. Neg SL   Hip: Intact range of motion  SI: Level, Min  tenderness  Knee: Intact range of motion  Reflexes: normal Knee  Strength: 5/5 globally   Sensory: Grossly intact   Skin: No bruising, erythema  Gait: Normal      Impression:  R leg pain  Back pain  Relevant History  BMI 38.26  Anxiety   RLS  Parkinson's  Vit D deficiency     Assessment:  Lumbar radiculopathy - R paracentral disc herniation L4-5  Axial back pain  Lumbar spondylosis       Plan:  Discussed options  Imaging/ relevant records viewed/ reviewed/ discussed  Imaging results viewed and reviewed (noted above)/ reviewed with patient   reviewed  Cont HEP  R TFESI L4-5 - pt would like to hold until pain worsens, pt will let us know when to schedule   B RFA L4-5 and L5-S1 prn axial pain  Spine surgeon referral if needed      Prescribed medications:  1. None    The impression and plan were discussed and explained in detail. All the questions were answered. Education was provided accordingly.         Follow-up:  As needed    Fabienne Donaldson MD

## 2025-01-20 ENCOUNTER — PATIENT MESSAGE (OUTPATIENT)
Facility: CLINIC | Age: 70
End: 2025-01-20
Payer: MEDICARE

## 2025-03-03 ENCOUNTER — PATIENT MESSAGE (OUTPATIENT)
Dept: PAIN MEDICINE | Facility: CLINIC | Age: 70
End: 2025-03-03
Payer: MEDICARE

## 2025-03-03 DIAGNOSIS — M54.16 LUMBAR RADICULOPATHY: Primary | ICD-10-CM

## 2025-03-05 ENCOUNTER — TELEPHONE (OUTPATIENT)
Dept: PAIN MEDICINE | Facility: CLINIC | Age: 70
End: 2025-03-05
Payer: MEDICARE

## 2025-03-05 NOTE — TELEPHONE ENCOUNTER
See other message scheduled for 03/21   
Types of orders made on 03/05/2025: Procedure Request      Order Date:3/5/2025   Ordering User:JEB GAMEZ [[id number]]   Encounter Provider:Jeb Gamez MD [[id number]]   Authorizing Provider: Jeb Gamez MD [[id number]]   Department:[facility] PAIN MANAGEMENT[[id number]]      Common Order Information   Procedure -> Epidural Injection (specify level) Cmt: ILESI L4-5      Order Specific Information   Order: Procedure Request Order for Pain Management [Custom: [id number]]  Order #:          [id number]: 1 FUTURE     Priority: Routine  Class: Clinic Performed     Future Order Information       Expires on:03/05/2026            Expected by:03/05/2025                   Associated Diagnoses       M54.16 Lumbar radiculopathy       Facility Name: -> [facility]          Follow-up: -> 2 weeks              Priority: Routine  Class: Clinic Performed     Future Order Information       Expires on:03/05/2026            Expected by:03/05/2025                   Associated Diagnoses       M54.16 Lumbar radiculopathy       Procedure -> Epidural Injection (specify level) Cmt: ILESI L4-5          
Detail Level: Zone
Note Text (......Xxx Chief Complaint.): This diagnosis correlates with the
Render Risk Assessment In Note?: no
Other (Free Text): Rx called in, patient was hesitant as to if he wanted to use.

## 2025-03-13 ENCOUNTER — OFFICE VISIT (OUTPATIENT)
Facility: CLINIC | Age: 70
End: 2025-03-13
Payer: MEDICARE

## 2025-03-13 VITALS
HEART RATE: 77 BPM | WEIGHT: 187.5 LBS | HEIGHT: 63 IN | DIASTOLIC BLOOD PRESSURE: 71 MMHG | BODY MASS INDEX: 33.22 KG/M2 | SYSTOLIC BLOOD PRESSURE: 137 MMHG

## 2025-03-13 DIAGNOSIS — G25.0 ESSENTIAL TREMOR: Primary | ICD-10-CM

## 2025-03-13 PROCEDURE — 95984 ALYS BRN NPGT PRGRMG ADDL 15: CPT | Mod: PBBFAC | Performed by: PSYCHIATRY & NEUROLOGY

## 2025-03-13 PROCEDURE — 99999 PR PBB SHADOW E&M-EST. PATIENT-LVL III: CPT | Mod: PBBFAC,,, | Performed by: PSYCHIATRY & NEUROLOGY

## 2025-03-13 PROCEDURE — 95983 ALYS BRN NPGT PRGRMG 15 MIN: CPT | Mod: PBBFAC | Performed by: PSYCHIATRY & NEUROLOGY

## 2025-03-13 PROCEDURE — 99213 OFFICE O/P EST LOW 20 MIN: CPT | Mod: PBBFAC | Performed by: PSYCHIATRY & NEUROLOGY

## 2025-03-13 RX ORDER — ESOMEPRAZOLE MAGNESIUM 40 MG/1
40 CAPSULE, DELAYED RELEASE ORAL EVERY MORNING
COMMUNITY

## 2025-03-13 RX ORDER — SERTRALINE HYDROCHLORIDE 50 MG/1
50 TABLET, FILM COATED ORAL NIGHTLY
COMMUNITY

## 2025-03-13 RX ORDER — TIZANIDINE 4 MG/1
4 TABLET ORAL EVERY 8 HOURS
Qty: 90 TABLET | Refills: 12 | Status: SHIPPED | OUTPATIENT
Start: 2025-03-13 | End: 2026-04-07

## 2025-03-13 NOTE — PROGRESS NOTES
Movement Disorders -   Name: Anna Wagoner  MRN: [id number]   CSN: [id number]      Date: 03/13/2025    Referring physician:  No referring provider defined for this encounter.    Chief Complaint: tremor     Interval Hx  B.L VIM DBS    Interval Hx    Since last visit,   Happy with tremor control  Had tried B and C  No clear favorite  At times habituates and switches between B and C every 3 mos    Continues to turn DBS OFF at night  Stopped lyrica    Previous  Interval Hx  Since last visit with RR, comes for evaluation for DBS  R handed woman.  Tried the CHERI trio which did not help.  He reports a b/l hand tremor since 9-9yo slowly progressive over her lifetime L>R in the last few years. Started to have a vocal tremor and head tremor in the last years. At this point she can no longer put on makeup, cannot write. At times drops items. Struggles to type and double types.    Since several year her R hand may cramp and fingers draw up.  At times feet cramp.  Chronic neck strain. Mentions she hs spinal stenosis but no record of MRI documenting.  Takes zanaflex 8mg QHS.  Had hallucinations on propranolol    Reports had a DATscan 2021 - [place]    At times unsteady gait. Falls once a year.  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 Some word-finding and repeating asking for tasks.    Son has a tremor  Otherwise no fam hx tremor    ----------------  Prior  Interval History:  - cheri-trio 60 day trial -- did not notice any improvement with the tremor  - L hand is a little worse, some change in direction   - notices it when holding things   - drops things with her R hand   - now off of phentermine   - feels that tremors slowed down with phentermine and even more with the increased dose   - drinks one cup of coffee in the morning    - tremor improves with etoh   - chronic headaches/migraines -- interested in seeing headache specialist here         From July 2022: Anna Wagoner is a R HANDED 69  y.o. female with a medical issues significant for HTN, HLD, hypothyroidism (hashimotos), and anxiety who presents for tremors. Referral from [facility]. Accompanied by daughter. Previously on primidone which she felt caused weight gain and constipation. No longer on it. She tried gabapentin and topamax in the past but stop due to cognitive side effects.  Propranolol caused nightmares and hallucinations?   She has had the tremors since she was at least 11 or 12 years old. Worse on the L hand now, feels that the tremor is progressing. Notices tremor whenever she is typing as well as whenever she is holding something. It does affect her handwriting. Tremor improves with EtOH. She is in a [employer]. She works full-time in [place]. Plans to work for 5 more years.   Primidone helped the tremor but after a few weeks didn't find it as effective. Max dose was 100 mg BID. Felt that she was gaining weight on primidone. Does not think that this was because of thyroid issues. Per chart review, clonazepam caused daytime lethargy but she does not recall this. She asks about medications that she can take during the day.   Very sensitive to side effects of medications.   Currently taking phentermine right now for weight loss. Learning how to eat different. Years ago she tried topamax but caused cognitive side effects.    Not interested in DBS or focused ultrasound.   Asks about BPPV, sees ENT in [place].       From outside neurology provider note  67 y.o. female presents today for neurological follow up. Last seen in follow up with Dr. Desai in January 2022, when primidone was prescribed given findings most consistent with essential tremor. She does have an essential tremor (both limbs and voice) which she has had since about age 11. Tremor more noticeable in left upper extremity than right. Requests referral to movement specialist. Scheduled to see weight-loss specialist given weight gain she attributes to  Primidone.   Neurocognitive status: Neurocognitive function has remained stable.   Current pertinent medications:Primidone triggered weight gain and constipation, so she has discontinued. She tried gabapentin and topamax, but discontinued due to cognitive side effects. Klonopin prn triggered daytime lethargy. Propranolol triggered nightmares and hallucinations.         Family History: none     Neuroleptic Exposure: none        Review of Systems:   Review of Systems   Constitutional:  Negative for chills, fever and malaise/fatigue.   HENT:  Negative for hearing loss.    Eyes:  Negative for blurred vision and double vision.   Respiratory:  Negative for cough, shortness of breath and stridor.    Cardiovascular:  Negative for chest pain and leg swelling.   Gastrointestinal:  Negative for constipation, diarrhea and nausea.   Genitourinary:  Negative for frequency and urgency.   Musculoskeletal:  Negative for falls.   Skin:  Negative for itching and rash.   Neurological:  Positive for tremors. Negative for dizziness, loss of consciousness and weakness.   Psychiatric/Behavioral:  Negative for hallucinations and memory loss.            Past Medical History: The patient  has a past medical history of Anxiety, Back pain, Hashimoto's thyroiditis, Hypertension, Neck pain, chronic, and Sleep apnea.    Social History: The patient  reports that she has quit smoking. Her smoking use included cigarettes. She has never been exposed to tobacco smoke. She has never used smokeless tobacco.    Family History: Their family history includes Tremor in her mother.    Allergies: Metoprolol succinate     Meds:   Current Outpatient Medications on File Prior to Visit   Medication Sig Dispense Refill    buPROPion (WELLBUTRIN XL) 300 MG 24 hr tablet Take 300 mg by mouth daily as needed.      butalbital-acetaminophen-caff -40 mg Cap       carvediloL (COREG) 12.5 MG tablet Take 12.5 mg by mouth 2 (two) times daily.      cholecalciferol,  "vitamin D3, 75 mcg (3,000 unit) Tab Take 1 tablet by mouth once daily.      co-enzyme Q-10 30 mg capsule Take 30 mg by mouth once daily.      ergocalciferol (ERGOCALCIFEROL) 50,000 unit Cap Take 50,000 Units by mouth every 7 days.      esomeprazole (NEXIUM) 40 MG capsule Take 40 mg by mouth every morning.      famotidine (PEPCID) 20 MG tablet Take 20 mg by mouth nightly.      ibuprofen (ADVIL,MOTRIN) 800 MG tablet Take 800 mg by mouth 2 (two) times daily as needed.      liothyronine (CYTOMEL) 5 MCG Tab Take 5 mcg by mouth.      rosuvastatin (CRESTOR) 10 MG tablet Take 10 mg by mouth every evening.      sertraline (ZOLOFT) 50 MG tablet Take 50 mg by mouth every evening.      tiZANidine (ZANAFLEX) 4 MG tablet Take 4 mg by mouth nightly.      DULoxetine (CYMBALTA) 20 MG capsule Take 20 mg by mouth once daily.      levothyroxine (SYNTHROID) 112 MCG tablet Take 112 mcg by mouth.      ondansetron (ZOFRAN-ODT) 4 MG TbDL Dissolve 1 tablet (4 mg total) by mouth every 8 (eight) hours as needed (nausea). 30 tablet 0    pantoprazole (PROTONIX) 40 MG tablet Take 40 mg by mouth.       Current Facility-Administered Medications on File Prior to Visit   Medication Dose Route Frequency Provider Last Rate Last Admin    lactated ringers infusion   Intravenous Continuous Fabienne Donaldson MD        LIDOcaine (PF) 10 mg/ml (1%) injection 10 mg  1 mL Intradermal Once Fabienne Donaldson MD           Exam:  /71 (BP Location: Right arm, Patient Position: Sitting)   Pulse 77   Ht 5' 3" (1.6 m)   Wt 85.1 kg (187 lb 8 oz)   BMI 33.21 kg/m²     Physical Exam  Constitutional: Well-developed, well-nourished, appears stated age  Eyes: No scleral icterus  ENT: Moist oral mucosa  Cardiovascular: No lower extremity edema   Respiratory: No labored breathing   Skin: No rash   Hematologic: No bruising    Other: GI/ deferred   Mental status: Alert and oriented to person, place, time, and situation;   follows commands  Speech: normal (not dysarthric), no " "aphasia  Cranial nerves:            CN II: Pupils mid-position and equal, not tested light or accommodation  CN III, IV, VI: Extraocular movements full, no nystagmus visualized  CN V: Not tested   CN VII: Face strong and symmetric bilaterally   CN VIII: Hearing intact to voice and conversation   CN IX, X: Palate raises midline and symmetric   CN XI: Strong shoulder shrug B/L  CN XII: Tongue appears midline   Motor: Normal bulk by appearance, no drift   Sensory: Not tested    Gait: Not tested  Deep tendon reflexes: Not tested  Movement/Coordination                    No hypophonic speech.                     No facial masking.  No bradykinesia.      Tremor Exam   Arms extended Arms in wing position, fingers almost touching Re-emergent Arms extended wrists extended Intention Resting Kinetic   Left ?+jerky ? ? ? ?+ ? ?+   Right ?+jerky ? ? ? ? ? ?   Head tremor: No-No   Vocal Tremor on EEEs and AAAs: POS  Leg tremor: POS      Archimedes Spirals   Left ?+   Right ?+         "  Constitutional  Well-developed, well-nourished, appears stated age   Ophthalmoscopic  No papilledema with no hemorrhages or exudates bilaterally   Cardiovascular  Radial pulses 2+ and symmetric, no LE edema bilaterally   Neurological    * Mental status  MOCA =      - Orientation  Oriented to person, place, time, and situation     - Memory   Intact recent and remote     - Attention/concentration  Attentive, vigilant during exam     - Language  Naming & repetition intact, +2-step commands     - Fund of knowledge  Aware of current events     - Executive  Well-organized thoughts     - Other     * Cranial nerves       - CN II  PERRL, visual fields full to confrontation     - CN III, IV, VI  Extraocular movements full, normal pursuits and saccades     - CN V  Sensation V1 - V3 intact     - CN VII  Face strong and symmetric bilaterally     - CN VIII  Hearing intact bilaterally     - CN IX, X  Palate raises midline and symmetric     - CN XI  SCM and " trapezius 5/5 bilaterally     - CN XII  Tongue midline   * Motor  Muscle bulk normal, strength 5/5 throughout   * Sensory   Intact to temperature    * Coordination  No dysmetria with finger-to-nose or heel-to-shin   * Gait  See below.   * Deep tendon reflexes  3+ palellae b/l  Hoffmans NEG   * Specialized movement exam  No hypophonic speech, very pleasant, appropriately animated    No facial masking.   No cogwheel rigidity     Mild wide-based gait   No shortened stride length.   No abnormal arm swing.     No postural instability.              Tremor Exam   Arms extended Arms in wing position, fingers almost touching Re-emergent Arms extended wrists extended Intention Resting Kinetic   Left ?++jwrky ? ? ? ?+ ? ?+   Right ?+jerky ? ? ? ? ? ?   Head tremor: No-No   Vocal Tremor on EEEs and AAAs: POS  Leg tremor: POS      Archimedes Spirals   Left ?++   Right ?+         _______________________________________  Procedure:    03/13/2025  Changed A to B and C  B and C caused fleeting parsthesias  No change to gait      DBS MRI Compatibility INFO      IPG Model(s) J16568   Serial No. [id number]   Impedance    Battery  82%   Pocket Adapter  NO     DBS Notes  L VIM can cause dysartrhia 1a and c especially  ALSO  9- R VIM causes dyarthria    Initial          Programming  Made group A a modified B -less issues when turning ON  B- old  C- other option    FINAL        ------Prior--------    Prior  Initial          Changed A and C to remove stim induced ataxia  Which can come from both l and R VIM  Speech is clearer immediately    FINAL          Initial        Programming  Made A, C, D    FINAL          -------------------  Initial      Monopolar review  Right VIM  Pulse width set at 60; Frequency set at 160  left VIM Monopolar review  C&11 @ 3.5mA no SFX mod tremor control  C&10 @ 3.5mA no SFX mod tremor control  C&9 @ 2.5 fleeting parasthesias mod tremor control BEST  C&8 @ 1.0mA - brief paraesthesias better typing   @2.0mA -  "brief paraesthesias better typing    Programming  Made interleaving to avoid speech slot for L hand        ================  "Monopolar Review  LEFT  Pulse width set at 60; Frequency set at 160  left VIM Monopolar review  C&3 @3.0 no SFX  C&2 @3.0mA speech slur  C&1 @1.0 tingling R arm   @1.4 speech changed  C&0 @1mA pulling and tingling leg and arm    Activated   C+  60/160  2a - @3.0 no tingling  2b - @3.0 no tingling  2c - @3.0 light tingling    Made 3 groups - avoid deep contacts  FINAL  A omni directional  B  C            Laboratory/Radiological:  - Results:  No visits with results within 3 Month(s) from this visit.   Latest known visit with results is:   Admission on 07/11/2023, Discharged on 07/12/2023   Component Date Value Ref Range Status    WBC 07/11/2023 7.92  3.90 - 12.70 K/uL Final    RBC 07/11/2023 4.72  4.00 - 5.40 M/uL Final    Hemoglobin 07/11/2023 13.6  12.0 - 16.0 g/dL Final    Hematocrit 07/11/2023 41.5  37.0 - 48.5 % Final    MCV 07/11/2023 88  82 - 98 fL Final    MCH 07/11/2023 28.8  27.0 - 31.0 pg Final    MCHC 07/11/2023 32.8  32.0 - 36.0 g/dL Final    RDW 07/11/2023 13.2  11.5 - 14.5 % Final    Platelets 07/11/2023 224  150 - 450 K/uL Final    MPV 07/11/2023 11.7  9.2 - 12.9 fL Final    Immature Granulocytes 07/11/2023 0.3  0.0 - 0.5 % Final    Gran # (ANC) 07/11/2023 4.2  1.8 - 7.7 K/uL Final    Immature Grans (Abs) 07/11/2023 0.02  0.00 - 0.04 K/uL Final    Lymph # 07/11/2023 2.6  1.0 - 4.8 K/uL Final    Mono # 07/11/2023 0.8  0.3 - 1.0 K/uL Final    Eos # 07/11/2023 0.3  0.0 - 0.5 K/uL Final    Baso # 07/11/2023 0.06  0.00 - 0.20 K/uL Final    nRBC 07/11/2023 0  0 /100 WBC Final    Gran % 07/11/2023 53.2  38.0 - 73.0 % Final    Lymph % 07/11/2023 32.4  18.0 - 48.0 % Final    Mono % 07/11/2023 9.6  4.0 - 15.0 % Final    Eosinophil % 07/11/2023 3.7  0.0 - 8.0 % Final    Basophil % 07/11/2023 0.8  0.0 - 1.9 % Final    Differential Method 07/11/2023 Automated   Final    aPTT 07/11/2023 28.2  " 21.0 - 32.0 sec Final    Prothrombin Time 07/11/2023 11.1  9.0 - 12.5 sec Final    INR 07/11/2023 1.0  0.8 - 1.2 Final    Group & Rh 07/11/2023 A NEG   Final    Indirect Danitza 07/11/2023 NEG   Final    Specimen Outdate 07/11/2023 07/14/2023 23:59   Final         Problem List Items Addressed This Visit          Neuro    Essential tremor - Primary    Current Assessment & Plan   Longtsanding ET-like tremor D/L DBS VIM  At times dystonic pulling R hand and possibly neck typical to ET  ETOH responsive    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

## 2025-03-13 NOTE — ASSESSMENT & PLAN NOTE
Lis[place]anding ET-like tremor D/L DBS VIM  At times dystonic pulling R hand and possibly neck typical to ET  ETOH responsive    Appears to have stim induced ataxia    Good tremor control on all settings A B C tried today  A- modified B lower PW  B- old  C- older    May switch between groups to avoid habituation    She knows to turn DBS OFF at night and ON in the morning

## 2025-03-13 NOTE — OR NURSING
Called patient to make sure she had no questions regarding tomorrow's procedure. She stated she understood everything. Sent to [facility], for review.

## 2025-03-14 ENCOUNTER — HOSPITAL ENCOUNTER (OUTPATIENT)
Facility: HOSPITAL | Age: 70
Discharge: HOME | End: 2025-03-14
Attending: STUDENT IN AN ORGANIZED HEALTH CARE EDUCATION/TRAINING PROGRAM | Admitting: SURGERY
Payer: MEDICARE

## 2025-03-14 VITALS
RESPIRATION RATE: 18 BRPM | HEART RATE: 87 BPM | WEIGHT: 187 LBS | HEIGHT: 63 IN | OXYGEN SATURATION: 98 % | DIASTOLIC BLOOD PRESSURE: 90 MMHG | BODY MASS INDEX: 33.13 KG/M2 | SYSTOLIC BLOOD PRESSURE: 140 MMHG | TEMPERATURE: 98 F

## 2025-03-14 DIAGNOSIS — Z92.241 S/P EPIDURAL STEROID INJECTION: ICD-10-CM

## 2025-03-14 DIAGNOSIS — M54.16 LUMBAR RADICULOPATHY: Primary | ICD-10-CM

## 2025-03-14 PROCEDURE — 62323 NJX INTERLAMINAR LMBR/SAC: CPT

## 2025-03-14 PROCEDURE — 25000003 PHARM REV CODE 250: Performed by: STUDENT IN AN ORGANIZED HEALTH CARE EDUCATION/TRAINING PROGRAM

## 2025-03-14 PROCEDURE — 71000015 HC POSTOP RECOV 1ST HR: Performed by: STUDENT IN AN ORGANIZED HEALTH CARE EDUCATION/TRAINING PROGRAM

## 2025-03-14 PROCEDURE — A4216 STERILE WATER/SALINE, 10 ML: HCPCS | Performed by: STUDENT IN AN ORGANIZED HEALTH CARE EDUCATION/TRAINING PROGRAM

## 2025-03-14 PROCEDURE — 63600175 PHARM REV CODE 636 W HCPCS: Performed by: STUDENT IN AN ORGANIZED HEALTH CARE EDUCATION/TRAINING PROGRAM

## 2025-03-14 PROCEDURE — 36000704 HC OR TIME LEV I 1ST 15 MIN: Performed by: STUDENT IN AN ORGANIZED HEALTH CARE EDUCATION/TRAINING PROGRAM

## 2025-03-14 PROCEDURE — 25500020 PHARM REV CODE 255: Performed by: STUDENT IN AN ORGANIZED HEALTH CARE EDUCATION/TRAINING PROGRAM

## 2025-03-14 PROCEDURE — 62323 NJX INTERLAMINAR LMBR/SAC: CPT | Mod: ,,, | Performed by: STUDENT IN AN ORGANIZED HEALTH CARE EDUCATION/TRAINING PROGRAM

## 2025-03-14 RX ORDER — FENTANYL CITRATE 50 UG/ML
INJECTION, SOLUTION INTRAMUSCULAR; INTRAVENOUS
Status: DISCONTINUED | OUTPATIENT
Start: 2025-03-14 | End: 2025-03-14 | Stop reason: HOSPADM

## 2025-03-14 RX ORDER — TIZANIDINE HYDROCHLORIDE 4 MG/1
4 CAPSULE, GELATIN COATED ORAL DAILY
COMMUNITY

## 2025-03-14 RX ORDER — MIDAZOLAM HYDROCHLORIDE 1 MG/ML
INJECTION INTRAMUSCULAR; INTRAVENOUS
Status: DISCONTINUED | OUTPATIENT
Start: 2025-03-14 | End: 2025-03-14 | Stop reason: HOSPADM

## 2025-03-14 RX ORDER — SODIUM CHLORIDE 9 MG/ML
INJECTION, SOLUTION INTRAMUSCULAR; INTRAVENOUS; SUBCUTANEOUS
Status: DISCONTINUED | OUTPATIENT
Start: 2025-03-14 | End: 2025-03-14 | Stop reason: HOSPADM

## 2025-03-14 RX ORDER — DEXAMETHASONE SODIUM PHOSPHATE 10 MG/ML
INJECTION, SOLUTION INTRA-ARTICULAR; INTRALESIONAL; INTRAMUSCULAR; INTRAVENOUS; SOFT TISSUE
Status: DISCONTINUED | OUTPATIENT
Start: 2025-03-14 | End: 2025-03-14 | Stop reason: HOSPADM

## 2025-03-14 RX ORDER — LIDOCAINE HYDROCHLORIDE 10 MG/ML
INJECTION, SOLUTION EPIDURAL; INFILTRATION; INTRACAUDAL; PERINEURAL
Status: DISCONTINUED | OUTPATIENT
Start: 2025-03-14 | End: 2025-03-14 | Stop reason: HOSPADM

## 2025-03-14 NOTE — PLAN OF CARE
Patient prepared for procedure.  No questions at this time.  Family at bedside.  Belongings left in room 7 with daughter.

## 2025-03-14 NOTE — H&P
CC: back pain    HPI: The patient is a 69 y.o. female with a history of back pain here for ILESI L4-5. There are no major changes in history and physical from 1/7/25 by Dr. Donaldson.    Past Medical History:   Diagnosis Date    Anxiety     Back pain     Hashimoto's thyroiditis     Hypertension     Neck pain, chronic     Sleep apnea     CPAP       Past Surgical History:   Procedure Laterality Date    BREAST SURGERY      CHOLECYSTECTOMY      DEEP BRAIN STIMULATOR PLACEMENT Left 4/18/2023    Procedure: INSERTION, DEEP BRAIN STIMULATOR;  Surgeon: Bonifacio Kraft MD;  Location: [facility] OR 2ND FLR;  Service: Neurosurgery;  Laterality: Left;  INSERTION ELECTRODES FOR DEEP BRAIN STIMULATION/ BILATERAL VENTRALIS INTERMEDIUS/ LEFT SIDE FIRST, RIGHT SIDE SECOND/ TEDS & SCD/ MEDTRONICS, SACHI MENCHACA.    DEEP BRAIN STIMULATOR PLACEMENT Right 7/11/2023    Procedure: INSERTION, DEEP BRAIN STIMULATOR;  Surgeon: Bonifacio Kraft MD;  Location: [facility] OR 2ND FLR;  Service: Neurosurgery;  Laterality: Right;  INSERT ELECTRODE FOR DEEP BRAIN STIMULATION/RIGHT VENTRALIS INTERMEDIUS/ TEDS & SCD/MEDTRONICS, PAPITO MORALES.    HYSTERECTOMY      INJECTION OF ANESTHETIC AGENT AROUND MEDIAL BRANCH NERVES INNERVATING LUMBAR FACET JOINT Bilateral 12/18/2023    Procedure: Block-nerve-medial branch-lumbar;  Surgeon: Fabienne Donaldson MD;  Location: [facility] ASU OR;  Service: Anesthesiology;  Laterality: Bilateral;  L4/5 and l5/s1 MBB    INJECTION OF ANESTHETIC AGENT AROUND MEDIAL BRANCH NERVES INNERVATING LUMBAR FACET JOINT Bilateral 1/22/2024    Procedure: Block-nerve-medial branch-lumbar;  Surgeon: Fabienne Donaldson MD;  Location: [facility] ASU OR;  Service: Anesthesiology;  Laterality: Bilateral;  L4/5 and l5/s1 MBB #2    INSERTION OF DEEP BRAIN STIMULATOR GENERATOR Left 4/25/2023    Procedure: INSERTION, PULSE GENERATOR, DEEP BRAIN STIMULATOR;  Surgeon: Bonifacio Kraft MD;  Location: [facility] OR 2ND FLR;  Service: Neurosurgery;  Laterality: Left;  INSERT PULSE GENERATOR FOR DEEP  BRAIN STIMULATION/TEDS & SCD/ MEDTRONICS, SACHI MENCHACA.    PLACEMENT OF FIDUCIAL SCREW INTO SPINE N/A 4/18/2023    Procedure: INSERTION, FIDUCIAL SCREW, SKULL/DBS;  Surgeon: Bonifacio Kraft MD;  Location: [facility] OR 2ND FLR;  Service: Neurosurgery;  Laterality: N/A;  APPLICATION OF FIDUCIALS FOR DEEP BRAIN STIMULATION/ TEDS & SCD/ MEDTRONICS, SACHI MENCHACA.    PLACEMENT OF FIDUCIAL SCREW INTO SPINE N/A 7/11/2023    Procedure: INSERTION, FIDUCIAL SCREW, SKULL/DBS;  Surgeon: Bonifacio Kraft MD;  Location: [facility] OR 2ND FLR;  Service: Neurosurgery;  Laterality: N/A;  INSERTION FIDUCIALS FOR DEEP BRAIN STIMULATION/ TEDS & SCD/ MEDTRONICS, PAPITO MORALES.    RADIOFREQUENCY ABLATION OF LUMBAR MEDIAL BRANCH NERVE AT SINGLE LEVEL Bilateral 2/12/2024    Procedure: Radiofrequency Ablation, Nerve, Spinal, Lumbar, Medial Branch, 1 Level;  Surgeon: Fabienne Donaldson MD;  Location: [facility] OR;  Service: Anesthesiology;  Laterality: Bilateral;  L4/5 and L5/s1 ( first or 2nd case please)    RADIOFREQUENCY ABLATION OF LUMBAR MEDIAL BRANCH NERVE AT SINGLE LEVEL Bilateral 11/18/2024    Procedure: Radiofrequency Ablation, Nerve, Spinal, Lumbar, Medial Branch, 1 Level;  Surgeon: Fabienne Donaldson MD;  [facility];  Service: Pain Management;  Laterality: Bilateral;    TONSILLECTOMY         Family History   Problem Relation Name Age of Onset    Tremor Mother         Social History     Socioeconomic History    Marital status:    Tobacco Use    Smoking status: Former     Types: Cigarettes     Passive exposure: Never    Smokeless tobacco: Never     Social Drivers of Health     Financial Resource Strain: Low Risk  (3/7/2025)    Overall Financial Resource Strain (CARDIA)     Difficulty of Paying Living Expenses: Not very hard   Food Insecurity: No Food Insecurity (3/7/2025)    Hunger Vital Sign     Worried About Running Out of Food in the Last Year: Never true     Ran Out of Food in the Last Year: Never true   Transportation Needs: No  "Transportation Needs (3/7/2025)    PRAPARE - Transportation     Lack of Transportation (Medical): No     Lack of Transportation (Non-Medical): No   Physical Activity: Inactive (3/7/2025)    Exercise Vital Sign     Days of Exercise per Week: 0 days     Minutes of Exercise per Session: 0 min   Stress: No Stress Concern Present (3/7/2025)    [employer] - Occupational Stress Questionnaire     Feeling of Stress : Only a little   Housing Stability: Low Risk  (3/7/2025)    Housing Stability Vital Sign     Unable to Pay for Housing in the Last Year: No     Number of Times Moved in the Last Year: 0     Homeless in the Last Year: No       Current Medications[1]    Review of patient's allergies indicates:   Allergen Reactions    Metoprolol succinate Hives and Itching     Hives - wheals       Vitals:    03/06/25 1111 03/14/25 0751   BP:  (!) 128/59   Pulse:  86   Resp:  20   Temp:  97.8 °F (36.6 °C)   TempSrc:  Skin   SpO2:  97%   Weight: 97.1 kg (214 lb 1.1 oz) 84.8 kg (187 lb)   Height:  5' 3" (1.6 m)       REVIEW OF SYSTEMS:     GENERAL: No weight loss, malaise or fevers.  HEENT:  No recent changes in vision or hearing  NECK: Negative for lumps, no difficulty with swallowing.  RESPIRATORY: Negative for cough, wheezing or shortness of breath, patient denies any recent URI.  CARDIOVASCULAR: Negative for chest pain, leg swelling or palpitations.  GI: Negative for abdominal discomfort, blood in stools or black stools or change in bowel habits.  MUSCULOSKELETAL: See HPI.  SKIN: Negative for lesions, rash, and itching.  PSYCH: No suicidal or homicidal ideations, no current mood disturbances.  HEMATOLOGY/LYMPHOLOGY: Negative for prolonged bleeding, bruising easily or swollen nodes. Patient is not currently taking any anti-coagulants  ENDO: No history of diabetes or thyroid dysfunction  NEURO: No history of syncope, paralysis, seizures or tremors.All other reviewed and negative other than " HPI.    Physical exam:  Gen: A and O x3, pleasant, well-groomed  Skin: No rashes or obvious lesions  HEENT: PERRLA, no obvious deformities on ears or in canals. No thyroid masses, trachea midline, no palpable lymph nodes in neck, axilla.  CVS: Regular rate and rhythm, normal S1 and S2, no murmurs.  Resp: Clear to auscultation bilaterally.  Abdomen: Soft, NT/ND, normal bowel sounds present.  Musculoskeletal/Neuro: Moving all extremities    Assessment:  There are no diagnoses linked to this encounter.      PLAN: ILESI L4-5      This patient has been cleared for surgery in an ambulatory surgical facility    ASA 3,  Mallampatti Score 3  No history of anesthetic complications  Plan for RN IV sedation        [1]   No current facility-administered medications for this encounter.     Facility-Administered Medications Ordered in Other Encounters   Medication Dose Route Frequency Provider Last Rate Last Admin    lactated ringers infusion   Intravenous Continuous Fabienne Donaldson MD        LIDOcaine (PF) 10 mg/ml (1%) injection 10 mg  1 mL Intradermal Once Fabienne Donaldson MD

## 2025-03-14 NOTE — DISCHARGE SUMMARY
[facility]  Discharge Note  Short Stay    Procedure(s) (LRB):  Injection-steroid-epidural-lumbar l4-5 (N/A)      OUTCOME: Patient tolerated treatment/procedure well without complication and is now ready for discharge.    DISPOSITION: Home or Self Care    FINAL DIAGNOSIS:  <principal problem not specified>    FOLLOWUP: In clinic    DISCHARGE INSTRUCTIONS:    Discharge Procedure Orders   Notify your health care provider if you experience any of the following:  temperature >100.4     Notify your health care provider if you experience any of the following:  severe uncontrolled pain     Notify your health care provider if you experience any of the following:  redness, tenderness, or signs of infection (pain, swelling, redness, odor or green/yellow discharge around incision site)     Activity as tolerated         Clinical Reference Documents Added to Patient Instructions         Document    STEROID INJECTION (ENGLISH)            TIME SPENT ON DISCHARGE: 20 minutes

## 2025-03-14 NOTE — OP NOTE
Patient: Anna Wagoner                                                    MRN: [id number]  : 1955                                              Date of procedure: 3/14/2025    Pre Procedure Diagnosis: Lumbar, lumbosacral radiculopathy    Post Procedure Diagnosis: Same    Procedure: Lumbar Epidural Steroid Injection Under Fluoroscopy at L4-5     Attending: Fabienne Donaldson MD    Local Anesthetic Injected: Lidocaine 1% 3 ml    Sedation Medications: See RN note    Estimated Blood Loss: None    Complication: None        Procedure:  After informed consent was obtained, patient was taken to the fluoroscopy suite and placed in a prone position.  The skin was prepped and draped in the usual sterile fashion using chlorhexidine. The skin and subcutaneous tissue overlying the Lumbar vertebral level was infiltrated with 3mLs of 1% Lidocaine using a 25 gauge 1.5 inch needle.  The 20-gauge Tuoehy needle was advanced with the aid of fluoroscopy using the water drop loss of resistance technique at the L4-5 interspinous space using an intralaminer approach.  Proper placement into the epidural space was confirmed by the loss of resistance.  There was no CSF, heme or paresthesias.  After negative aspiration, 0.5mL of contrast was injected.  The contrast confirmed the needle placement by spread delineating the epidural space in multiple views.  Then after negative aspiration, an injectate consisting of 6mLs of 0.5mL 10mg/mL of Dexamethasone, 0.5mL of preservative free lidocaine and 5mL of preservative free normal saline was injected.  Patient tolerated the procedure well and all needles were removed intact.  There were no complications.    Patient was observed in recovery and discharged home under supervision with discharge instructions in stable condition.

## 2025-03-19 ENCOUNTER — TELEPHONE (OUTPATIENT)
Facility: CLINIC | Age: 70
End: 2025-03-19
Payer: MEDICARE

## 2025-03-19 ENCOUNTER — PATIENT MESSAGE (OUTPATIENT)
Facility: CLINIC | Age: 70
End: 2025-03-19
Payer: MEDICARE

## 2025-03-19 NOTE — TELEPHONE ENCOUNTER
Called PT today to inform them that August is far out to put them in for there DBS appt and that our scheduling system opens every 3 months. Pt understand. Also asking the Pt for there sons info. PT providers sons info. She informs me that he does not have a [facility] account. I inform pt that her son needs to make a [facility] account so I can give him a virtual visit. Pt will let me know when he is done setting up his [facility] acct so I give him a virtual appt.

## 2025-03-29 ENCOUNTER — PATIENT MESSAGE (OUTPATIENT)
Facility: CLINIC | Age: 70
End: 2025-03-29
Payer: MEDICARE

## 2025-04-04 ENCOUNTER — PATIENT MESSAGE (OUTPATIENT)
Dept: PAIN MEDICINE | Facility: CLINIC | Age: 70
End: 2025-04-04
Payer: MEDICARE

## 2025-04-09 ENCOUNTER — PATIENT MESSAGE (OUTPATIENT)
Facility: CLINIC | Age: 70
End: 2025-04-09
Payer: MEDICARE

## 2025-04-16 ENCOUNTER — TELEPHONE (OUTPATIENT)
Dept: PAIN MEDICINE | Facility: CLINIC | Age: 70
End: 2025-04-16

## 2025-04-16 ENCOUNTER — OFFICE VISIT (OUTPATIENT)
Dept: PAIN MEDICINE | Facility: CLINIC | Age: 70
End: 2025-04-16
Payer: MEDICARE

## 2025-04-16 VITALS — BODY MASS INDEX: 33.13 KG/M2 | WEIGHT: 187 LBS | HEIGHT: 63 IN

## 2025-04-16 DIAGNOSIS — M54.16 LUMBAR RADICULOPATHY: Primary | ICD-10-CM

## 2025-04-16 PROCEDURE — 99213 OFFICE O/P EST LOW 20 MIN: CPT | Mod: PBBFAC,PN | Performed by: STUDENT IN AN ORGANIZED HEALTH CARE EDUCATION/TRAINING PROGRAM

## 2025-04-16 PROCEDURE — 99999 PR PBB SHADOW E&M-EST. PATIENT-LVL III: CPT | Mod: PBBFAC,,, | Performed by: STUDENT IN AN ORGANIZED HEALTH CARE EDUCATION/TRAINING PROGRAM

## 2025-04-16 RX ORDER — KETOROLAC TROMETHAMINE 30 MG/ML
30 INJECTION, SOLUTION INTRAMUSCULAR; INTRAVENOUS
Status: SHIPPED | OUTPATIENT
Start: 2025-04-16

## 2025-04-16 RX ORDER — IBUPROFEN 800 MG/1
800 TABLET ORAL 2 TIMES DAILY PRN
Qty: 60 TABLET | Refills: 2 | Status: SHIPPED | OUTPATIENT
Start: 2025-04-16

## 2025-04-16 NOTE — TELEPHONE ENCOUNTER
Types of orders made on 04/16/2025: Procedure Request      Order Date:4/16/2025   Ordering User:JEB GAMEZ [[id number]]   Encounter Provider:Jeb Gamez MD [[id number]]   Authorizing Provider: Jeb Gamez MD [[id number]   Department:[facility] PAIN MANAGEMENT[[id number]]      Common Order Information   Procedure -> Transforaminal Injection (Specify level and laterality) Cmt: R             TFESI L4-5 and L5-S1      Order Specific Information   Order: Procedure Request Order for Pain Management [Custom: [id number]]  Order #:          [id number]: 1 FUTURE     Priority: Routine  Class: Clinic Performed     Future Order Information       Expires on:04/16/2026            Expected by:04/16/2025                   Associated Diagnoses       M54.16 Lumbar radiculopathy       Facility Name: -> [facility]          Follow-up: -> 2 weeks              Priority: Routine  Class: Clinic Performed     Future Order Information       Expires on:04/16/2026            Expected by:04/16/2025                   Associated Diagnoses       M54.16 Lumbar radiculopathy       Procedure -> Transforaminal Injection (Specify level and laterality) Cmt: R                 TFESI L4-5 and L5-S1

## 2025-04-16 NOTE — H&P (VIEW-ONLY)
[place] - Department     Office Note    Victor Hugo Evans MD      First Office Visit: 11/30/23  Today' Date: 4/16/2025  Last Office Visit: 1/7/25    Chief complaint: back pain     HPI: Pt is a pleasent 69 y.o., who presents for evaluation. Referred by Krystal Helton NP. Pt seen previously for back pain and R leg pain. Pt seen today for f/u from ILESI L4-5. States the injection provided no relief. Does continue to endorse having R leg pain that starts in the right side of the lower back and goes down the front of the R leg to mid thigh. No BB changes. Is doing HEP.     Pain score: 0  Pain disability score: 35      Relevant Imaging/ Testing:   MR L-spine 9/23 - R paracentral disc herniation L4-5, DDD  XR C-spine 3/23  CT C-spine 3/23  MR C-spine 2/23  XR T-spine 7/21  XR L-spine 8/20    Procedures:   ILESI L4-5 3/14/25  B RFA L4-5 and L5-S1 2/12/24, 11/18/24 - >50% relief  B MBB L4-5 and L5-S1 12/18/23, 1/22/24      Date of board of pharmacy review:4/16/2025  Date of opioid risk screening/ pain psych: None  Date of opioid agreement and consent: None  Date of urine drug screen: None  Date of random pill count: None     was reviewed today: reviewed, no concerns     Prescribed medications: None    See EHR for  PMH, PSH, FH, SH, Medications and Allergy    ROS:  Positive for pain  ROS     PE:  There were no vitals filed for this visit.    General: Pleasant, no distress  HEENT: NC/ AT. PERRLA  CV: Radial pulses intact  Pulm: No distress  Ext: No edema    Physical Exam     Neuromusculoskeletal:  Head: NC, AT. PERRLA  Neck: Intact range of motions, extension, flexion, rotation. Neg Facet loading. Neg Spurling. Min Tenderness.  5/5 Strength, normal tone  Shoulder: Intact range of motion  Lumbar: Intact range of motion. Pos Facet loading bilaterally. Min Tenderness. Neg SL   Hip: Intact range of motion  SI: Level, Min tenderness  Knee: Intact range of motion  Reflexes: normal Knee  Strength: 5/5 globally   Sensory: Grossly  intact   Skin: No bruising, erythema  Gait: Normal      Impression:  R leg pain  Back pain  Relevant History  BMI 38.26  Anxiety   RLS  Parkinson's  Vit D deficiency     Assessment:  Lumbar radiculopathy - Pt w/ new onset R leg pain consistent w/ lumbar radiculopathy. R paracentral disc herniation L4-5. No relief w/ ILESI L4-5. Will try R TFESI L4-5 and L5-S1  Axial back pain - Pt w/ component of axial back pain that has responded to B RFA L4-5 and L5-S1   Lumbar spondylosis       Plan:  Discussed options  Imaging/ relevant records viewed/ reviewed/ discussed  Imaging results viewed and reviewed (noted above)/ reviewed with patient   reviewed  Cont HEP  R TFESI L4-5 and L5-S1   Ibuprofen 800 mg rx per pt request  IM toradol today in clinic   Re-eval after  B RFA L4-5 and L5-S1 prn axial pain  Back to neurosurgery if needed      Prescribed medications:  1. Ibuprofen     The impression and plan were discussed and explained in detail. All the questions were answered. Education was provided accordingly.     The procedure was explained in detail, along with risks and potential side effects.    The appropriate use of the medications, including storage, risks (including addiction) and potential sides effects were explained and discussed.     Follow-up:  For procedure     Fabienne Donaldson MD

## 2025-04-16 NOTE — PROGRESS NOTES
[place] - Department     Office Note    Victor Hugo Evans MD      First Office Visit: 11/30/23  Today' Date: 4/16/2025  Last Office Visit: 1/7/25    Chief complaint: back pain     HPI: Pt is a pleasent 69 y.o., who presents for evaluation. Referred by Krystal Helton NP. Pt seen previously for back pain and R leg pain. Pt seen today for f/u from ILESI L4-5. States the injection provided no relief. Does continue to endorse having R leg pain that starts in the right side of the lower back and goes down the front of the R leg to mid thigh. No BB changes. Is doing HEP.     Pain score: 0  Pain disability score: 35      Relevant Imaging/ Testing:   MR L-spine 9/23 - R paracentral disc herniation L4-5, DDD  XR C-spine 3/23  CT C-spine 3/23  MR C-spine 2/23  XR T-spine 7/21  XR L-spine 8/20    Procedures:   ILESI L4-5 3/14/25  B RFA L4-5 and L5-S1 2/12/24, 11/18/24 - >50% relief  B MBB L4-5 and L5-S1 12/18/23, 1/22/24      Date of board of pharmacy review:4/16/2025  Date of opioid risk screening/ pain psych: None  Date of opioid agreement and consent: None  Date of urine drug screen: None  Date of random pill count: None     was reviewed today: reviewed, no concerns     Prescribed medications: None    See EHR for  PMH, PSH, FH, SH, Medications and Allergy    ROS:  Positive for pain  ROS     PE:  There were no vitals filed for this visit.    General: Pleasant, no distress  HEENT: NC/ AT. PERRLA  CV: Radial pulses intact  Pulm: No distress  Ext: No edema    Physical Exam     Neuromusculoskeletal:  Head: NC, AT. PERRLA  Neck: Intact range of motions, extension, flexion, rotation. Neg Facet loading. Neg Spurling. Min Tenderness.  5/5 Strength, normal tone  Shoulder: Intact range of motion  Lumbar: Intact range of motion. Pos Facet loading bilaterally. Min Tenderness. Neg SL   Hip: Intact range of motion  SI: Level, Min tenderness  Knee: Intact range of motion  Reflexes: normal Knee  Strength: 5/5 globally   Sensory: Grossly  intact   Skin: No bruising, erythema  Gait: Normal      Impression:  R leg pain  Back pain  Relevant History  BMI 38.26  Anxiety   RLS  Parkinson's  Vit D deficiency     Assessment:  Lumbar radiculopathy - Pt w/ new onset R leg pain consistent w/ lumbar radiculopathy. R paracentral disc herniation L4-5. No relief w/ ILESI L4-5. Will try R TFESI L4-5 and L5-S1  Axial back pain - Pt w/ component of axial back pain that has responded to B RFA L4-5 and L5-S1   Lumbar spondylosis       Plan:  Discussed options  Imaging/ relevant records viewed/ reviewed/ discussed  Imaging results viewed and reviewed (noted above)/ reviewed with patient   reviewed  Cont HEP  R TFESI L4-5 and L5-S1   Ibuprofen 800 mg rx per pt request  IM toradol today in clinic   Re-eval after  B RFA L4-5 and L5-S1 prn axial pain  Back to neurosurgery if needed      Prescribed medications:  1. Ibuprofen     The impression and plan were discussed and explained in detail. All the questions were answered. Education was provided accordingly.     The procedure was explained in detail, along with risks and potential side effects.    The appropriate use of the medications, including storage, risks (including addiction) and potential sides effects were explained and discussed.     Follow-up:  For procedure     Fabienne Donaldson MD

## 2025-05-05 ENCOUNTER — HOSPITAL ENCOUNTER (OUTPATIENT)
Facility: HOSPITAL | Age: 70
Discharge: HOME | End: 2025-05-05
Attending: STUDENT IN AN ORGANIZED HEALTH CARE EDUCATION/TRAINING PROGRAM | Admitting: STUDENT IN AN ORGANIZED HEALTH CARE EDUCATION/TRAINING PROGRAM
Payer: MEDICARE

## 2025-05-05 DIAGNOSIS — M54.16 LUMBAR RADICULITIS: ICD-10-CM

## 2025-05-05 DIAGNOSIS — M54.17 LUMBOSACRAL RADICULOPATHY: Primary | ICD-10-CM

## 2025-05-05 PROCEDURE — 25000003 PHARM REV CODE 250: Performed by: STUDENT IN AN ORGANIZED HEALTH CARE EDUCATION/TRAINING PROGRAM

## 2025-05-05 PROCEDURE — 64483 NJX AA&/STRD TFRM EPI L/S 1: CPT | Mod: RT,,, | Performed by: STUDENT IN AN ORGANIZED HEALTH CARE EDUCATION/TRAINING PROGRAM

## 2025-05-05 PROCEDURE — 25500020 PHARM REV CODE 255: Performed by: STUDENT IN AN ORGANIZED HEALTH CARE EDUCATION/TRAINING PROGRAM

## 2025-05-05 PROCEDURE — 64484 NJX AA&/STRD TFRM EPI L/S EA: CPT | Mod: RT | Performed by: STUDENT IN AN ORGANIZED HEALTH CARE EDUCATION/TRAINING PROGRAM

## 2025-05-05 PROCEDURE — 64484 NJX AA&/STRD TFRM EPI L/S EA: CPT | Mod: RT,,, | Performed by: STUDENT IN AN ORGANIZED HEALTH CARE EDUCATION/TRAINING PROGRAM

## 2025-05-05 PROCEDURE — 99152 MOD SED SAME PHYS/QHP 5/>YRS: CPT | Performed by: STUDENT IN AN ORGANIZED HEALTH CARE EDUCATION/TRAINING PROGRAM

## 2025-05-05 PROCEDURE — 64483 NJX AA&/STRD TFRM EPI L/S 1: CPT | Mod: RT | Performed by: STUDENT IN AN ORGANIZED HEALTH CARE EDUCATION/TRAINING PROGRAM

## 2025-05-05 PROCEDURE — 63600175 PHARM REV CODE 636 W HCPCS: Performed by: STUDENT IN AN ORGANIZED HEALTH CARE EDUCATION/TRAINING PROGRAM

## 2025-05-05 PROCEDURE — A4216 STERILE WATER/SALINE, 10 ML: HCPCS | Performed by: STUDENT IN AN ORGANIZED HEALTH CARE EDUCATION/TRAINING PROGRAM

## 2025-05-05 RX ORDER — SODIUM CHLORIDE, SODIUM LACTATE, POTASSIUM CHLORIDE, CALCIUM CHLORIDE 600; 310; 30; 20 MG/100ML; MG/100ML; MG/100ML; MG/100ML
INJECTION, SOLUTION INTRAVENOUS CONTINUOUS
Status: DISCONTINUED | OUTPATIENT
Start: 2025-05-05 | End: 2025-05-05 | Stop reason: HOSPADM

## 2025-05-05 RX ORDER — MIDAZOLAM HYDROCHLORIDE 1 MG/ML
INJECTION INTRAMUSCULAR; INTRAVENOUS
Status: DISCONTINUED | OUTPATIENT
Start: 2025-05-05 | End: 2025-05-05 | Stop reason: HOSPADM

## 2025-05-05 RX ORDER — LIDOCAINE HYDROCHLORIDE 10 MG/ML
INJECTION, SOLUTION EPIDURAL; INFILTRATION; INTRACAUDAL; PERINEURAL
Status: DISCONTINUED | OUTPATIENT
Start: 2025-05-05 | End: 2025-05-05 | Stop reason: HOSPADM

## 2025-05-05 RX ORDER — SODIUM CHLORIDE 9 MG/ML
INJECTION, SOLUTION INTRAMUSCULAR; INTRAVENOUS; SUBCUTANEOUS
Status: DISCONTINUED | OUTPATIENT
Start: 2025-05-05 | End: 2025-05-05 | Stop reason: HOSPADM

## 2025-05-05 RX ORDER — LIDOCAINE HYDROCHLORIDE 10 MG/ML
1 INJECTION, SOLUTION EPIDURAL; INFILTRATION; INTRACAUDAL; PERINEURAL ONCE
Status: COMPLETED | OUTPATIENT
Start: 2025-05-05 | End: 2025-05-05

## 2025-05-05 RX ORDER — FENTANYL CITRATE 50 UG/ML
INJECTION, SOLUTION INTRAMUSCULAR; INTRAVENOUS
Status: DISCONTINUED | OUTPATIENT
Start: 2025-05-05 | End: 2025-05-05 | Stop reason: HOSPADM

## 2025-05-05 RX ORDER — DEXAMETHASONE SODIUM PHOSPHATE 10 MG/ML
INJECTION INTRAMUSCULAR; INTRAVENOUS
Status: DISCONTINUED | OUTPATIENT
Start: 2025-05-05 | End: 2025-05-05 | Stop reason: HOSPADM

## 2025-05-05 RX ADMIN — LIDOCAINE HYDROCHLORIDE 0.2 ML: 10 INJECTION, SOLUTION EPIDURAL; INFILTRATION; INTRACAUDAL at 07:05

## 2025-05-05 NOTE — OP NOTE
Patient: Anna Wagoner                                                    MRN: [id number]  : 1955                                              Date of procedure: 2025      Pre Procedure Diagnosis: Lumbar, Lumbosacral radiculopathy    Post Procedure Diagnosis: Same    Procedure:   Transforaminal Epidural Steroid Injection Under Fluoroscopy at R L4-5  Transforaminal Epidural Steroid Injection Under Fluoroscopy at R L5-S1    Attending: Fabienne Donaldson MD    Local Anesthetic Injected: Lidocaine 1% 6ml    Sedation Medications: See RN note    Estimated Blood Loss: None    Complication: None        Procedure:  After informed consent was obtained, patient was taken to the fluoroscopy suite and placed in a prone position.  The skin was prepped and draped in the usual sterile fashion using chlorhexidine.  Anatomical landmarks were identified with the aid of fluoroscopy, and the skin and subcutaneous tissue overlying the neural foramen was infiltrated with 3mL of 1% Lidocaine using a 25-gauge 1.5 inch needle.  A 22-gauge 5-inch needle was advanced with the aid of fluoroscopy in an oblique view such that the needle tip entered the neural foramen.   Needle placement was confirmed with fluoroscopy in PA and Lateral views.  After a negative aspiration, 0.5mL of contrast was injected.  The contrast delineated the nerve root as well as the epidural spread.  After negative aspiration, an injectate consisting of 0.5mL of 10mg/mL of Dexamethasone, 0.5mL of 1% preservative free lidocaine and 3mL of preservative normal saline was injected.  This was repeated at the other level. Patient tolerated the procedure well and all needles were removed intact.  There were no complications.    Patient was observed in recovery and discharged home with written instructions under supervision in stable condition.

## 2025-05-05 NOTE — DISCHARGE SUMMARY
[facility] - Periop Services  Discharge Note  Short Stay    Procedure(s) (LRB):  INJECTION, SPINE, LUMBOSACRAL, TRANSFORAMINAL APPROACH l4-5 and l5-s1 (Right)      OUTCOME: Patient tolerated treatment/procedure well without complication and is now ready for discharge.    DISPOSITION: Home or Self Care    FINAL DIAGNOSIS:  <principal problem not specified>    FOLLOWUP: In clinic    DISCHARGE INSTRUCTIONS:    Discharge Procedure Orders   Notify your health care provider if you experience any of the following:  temperature >100.4     Notify your health care provider if you experience any of the following:  severe uncontrolled pain     Notify your health care provider if you experience any of the following:  redness, tenderness, or signs of infection (pain, swelling, redness, odor or green/yellow discharge around incision site)     Activity as tolerated        TIME SPENT ON DISCHARGE: 20 minutes

## 2025-05-06 VITALS
WEIGHT: 186.94 LBS | OXYGEN SATURATION: 96 % | DIASTOLIC BLOOD PRESSURE: 59 MMHG | TEMPERATURE: 98 F | SYSTOLIC BLOOD PRESSURE: 131 MMHG | RESPIRATION RATE: 18 BRPM | BODY MASS INDEX: 33.12 KG/M2 | HEART RATE: 74 BPM | HEIGHT: 63 IN

## 2025-05-07 ENCOUNTER — PATIENT MESSAGE (OUTPATIENT)
Dept: PAIN MEDICINE | Facility: CLINIC | Age: 70
End: 2025-05-07
Payer: MEDICARE

## 2025-06-11 ENCOUNTER — OFFICE VISIT (OUTPATIENT)
Dept: PAIN MEDICINE | Facility: CLINIC | Age: 70
End: 2025-06-11
Payer: MEDICARE

## 2025-06-11 VITALS — BODY MASS INDEX: 32.96 KG/M2 | WEIGHT: 186 LBS | HEIGHT: 63 IN

## 2025-06-11 DIAGNOSIS — M54.9 DORSALGIA, UNSPECIFIED: Primary | ICD-10-CM

## 2025-06-11 PROCEDURE — 99213 OFFICE O/P EST LOW 20 MIN: CPT | Mod: S$PBB,,, | Performed by: STUDENT IN AN ORGANIZED HEALTH CARE EDUCATION/TRAINING PROGRAM

## 2025-06-11 PROCEDURE — 99214 OFFICE O/P EST MOD 30 MIN: CPT | Mod: PBBFAC,PN | Performed by: STUDENT IN AN ORGANIZED HEALTH CARE EDUCATION/TRAINING PROGRAM

## 2025-06-11 PROCEDURE — 99999 PR PBB SHADOW E&M-EST. PATIENT-LVL IV: CPT | Mod: PBBFAC,,, | Performed by: STUDENT IN AN ORGANIZED HEALTH CARE EDUCATION/TRAINING PROGRAM

## 2025-06-11 NOTE — PROGRESS NOTES
[place] - Department     Office Note    Victor Hugo Evans MD      First Office Visit: 11/30/23  Today' Date: 6/11/2025  Last Office Visit: 4/16/25    Chief complaint: back pain     HPI: Pt is a pleasent 70 y.o., who presents for evaluation. Referred by Krystal Helton NP. Pt seen previously for back pain and R leg pain. Pt seen today for f/u from R TFESI L4-5 and L5-S1. States she has had some relief from the MARIA FERNANDA which was an improvement from the previous MARIA FERNANDA. Does continue to endorse having some R leg pain that starts in the R side of the lower back and goes down the front of the R leg to mid thigh. Pt does have a hx of R sided disc herniation at L4-5. No BB changes. Is doing HEP.     Pain score: 0  Pain disability score: 35      Relevant Imaging/ Testing:   MR L-spine 9/23 - R paracentral disc herniation L4-5, DDD  XR C-spine 3/23  CT C-spine 3/23  MR C-spine 2/23  XR T-spine 7/21  XR L-spine 8/20    Procedures:   R TFESI L4-5 and L5-S1 5/5/25  ILESI L4-5 3/14/25  B RFA L4-5 and L5-S1 2/12/24, 11/18/24 - >50% relief  B MBB L4-5 and L5-S1 12/18/23, 1/22/24      Date of board of pharmacy review:6/11/2025  Date of opioid risk screening/ pain psych: None  Date of opioid agreement and consent: None  Date of urine drug screen: None  Date of random pill count: None     was reviewed today: reviewed, no concerns     Prescribed medications: None    See EHR for  PMH, PSH, FH, SH, Medications and Allergy    ROS:  Positive for pain  ROS     PE:  There were no vitals filed for this visit.    General: Pleasant, no distress  HEENT: NC/ AT. PERRLA  CV: Radial pulses intact  Pulm: No distress  Ext: No edema    Physical Exam     Neuromusculoskeletal:  Head: NC, AT. PERRLA  Neck: Intact range of motions, extension, flexion, rotation. Neg Facet loading. Neg Spurling. Min Tenderness.  5/5 Strength, normal tone  Shoulder: Intact range of motion  Lumbar: Intact range of motion. Pos Facet loading bilaterally. Min Tenderness. Neg SL    Hip: Intact range of motion  SI: Level, Min tenderness  Knee: Intact range of motion  Reflexes: normal Knee  Strength: 5/5 globally   Sensory: Grossly intact   Skin: No bruising, erythema  Gait: Normal      Impression:  R leg pain  Back pain  Relevant History  BMI 38.26  Anxiety   RLS  Parkinson's  Vit D deficiency     Assessment:  Lumbar radiculopathy - Pt w/ new onset R leg pain consistent w/ lumbar radiculopathy. R paracentral disc herniation L4-5  Axial back pain - Pt w/ component of axial back pain that has responded to B RFA L4-5 and L5-S1   Lumbar spondylosis       Plan:  Discussed options  Imaging/ relevant records viewed/ reviewed/ discussed  Imaging results viewed and reviewed (noted above)/ reviewed with patient   reviewed  Cont HEP  Update MR L-spine   Re-eval after  Plan for repeat R TFESI L4-5 and L5-S1 if needed  B RFA L4-5 and L5-S1 prn axial pain  Back to neurosurgery if needed      Prescribed medications:  1. Ibuprofen     The impression and plan were discussed and explained in detail. All the questions were answered. Education was provided accordingly.     The procedure was explained in detail, along with risks and potential side effects.    The appropriate use of the medications, including storage, risks (including addiction) and potential sides effects were explained and discussed.     Follow-up:  After MR L-spine     Fabienne Donaldson MD

## 2025-06-12 ENCOUNTER — PATIENT MESSAGE (OUTPATIENT)
Dept: PAIN MEDICINE | Facility: CLINIC | Age: 70
End: 2025-06-12
Payer: MEDICARE

## 2025-06-30 ENCOUNTER — TELEPHONE (OUTPATIENT)
Dept: PAIN MEDICINE | Facility: CLINIC | Age: 70
End: 2025-06-30
Payer: MEDICARE

## 2025-06-30 NOTE — TELEPHONE ENCOUNTER
Copied from CRM #[id number]. Topic: Appointments - Appointment Access  >> Jun 30, 2025  2:32 PM Mandy wrote:  Type:  Sooner Apoointment Request    Caller is requesting a sooner appointment.  Caller declined first available appointment listed below.  Caller will not accept being placed on the waitlist and is requesting a message be sent to doctor.    Name of Caller:pt     When is the first available appointment?July 16th     Symptoms:knee     Would the patient rather a call back or a response via [facility]? Call     Best Call Back Number:[phone]    Additional Information: pt is calling because she is unable to get to her appointment at 11am and she is needing to see if her appointment can be moved. 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 She would like to change her appointment to either a sooner date or maybe later in the day. Please give her a call to discuss options.

## 2025-06-30 NOTE — TELEPHONE ENCOUNTER
Returned pt call ,pt wanted to be seen a little later on her  schedule appt time   Patient states that she will be in clinic around 1:30 pm

## 2025-07-12 ENCOUNTER — HOSPITAL ENCOUNTER (OUTPATIENT)
Dept: RADIOLOGY | Facility: HOSPITAL | Age: 70
Discharge: HOME | End: 2025-07-12
Attending: STUDENT IN AN ORGANIZED HEALTH CARE EDUCATION/TRAINING PROGRAM
Payer: MEDICARE

## 2025-07-12 DIAGNOSIS — M54.9 DORSALGIA, UNSPECIFIED: ICD-10-CM

## 2025-07-12 PROCEDURE — 72148 MRI LUMBAR SPINE W/O DYE: CPT | Mod: 26,,, | Performed by: RADIOLOGY

## 2025-07-12 PROCEDURE — 72148 MRI LUMBAR SPINE W/O DYE: CPT | Mod: TC

## 2025-07-14 ENCOUNTER — TELEPHONE (OUTPATIENT)
Dept: PAIN MEDICINE | Facility: CLINIC | Age: 70
End: 2025-07-14
Payer: MEDICARE

## 2025-07-16 ENCOUNTER — OFFICE VISIT (OUTPATIENT)
Dept: PAIN MEDICINE | Facility: CLINIC | Age: 70
End: 2025-07-16
Payer: MEDICARE

## 2025-07-16 VITALS — WEIGHT: 186.06 LBS | HEIGHT: 63 IN | BODY MASS INDEX: 32.97 KG/M2

## 2025-07-16 DIAGNOSIS — M54.16 LUMBAR RADICULOPATHY: Primary | ICD-10-CM

## 2025-07-16 PROCEDURE — 99213 OFFICE O/P EST LOW 20 MIN: CPT | Mod: S$PBB,,, | Performed by: STUDENT IN AN ORGANIZED HEALTH CARE EDUCATION/TRAINING PROGRAM

## 2025-07-16 PROCEDURE — 99215 OFFICE O/P EST HI 40 MIN: CPT | Mod: PBBFAC,PN | Performed by: STUDENT IN AN ORGANIZED HEALTH CARE EDUCATION/TRAINING PROGRAM

## 2025-07-16 PROCEDURE — 99999 PR PBB SHADOW E&M-EST. PATIENT-LVL V: CPT | Mod: PBBFAC,,, | Performed by: STUDENT IN AN ORGANIZED HEALTH CARE EDUCATION/TRAINING PROGRAM

## 2025-07-17 ENCOUNTER — PATIENT MESSAGE (OUTPATIENT)
Dept: PAIN MEDICINE | Facility: CLINIC | Age: 70
End: 2025-07-17
Payer: MEDICARE

## 2025-07-17 NOTE — PROGRESS NOTES
[place] - Department     Office Note    Victor Hugo Evans MD      First Office Visit: 11/30/23  Today' Date: 7/16/2025  Last Office Visit: 6/11/25    Chief complaint: back pain     HPI: Pt is a pleasent 70 y.o., who presents for evaluation. Referred by Krystal Helton NP. Pt seen previously for back pain and R leg pain. Pt seen today for continued back and R leg pain. Has had 2x MARIA FERNANDA, both of which have been helpful. Pt is wondering what long term solution there is and what her options are. Does continue to endorse having some R leg pain that starts in the R side of the lower back and goes down the front of the R leg to mid thigh. Pt does have a hx of R sided disc herniation at L4-5. No BB changes. Is doing HEP.     Pain score: 0  Pain disability score: 35      Relevant Imaging/ Testing:   MR L-spine 9/23 - R paracentral disc herniation L4-5, DDD  XR C-spine 3/23  CT C-spine 3/23  MR C-spine 2/23  XR T-spine 7/21  XR L-spine 8/20    Procedures:   R TFESI L4-5 and L5-S1 5/5/25  ILESI L4-5 3/14/25  B RFA L4-5 and L5-S1 2/12/24, 11/18/24 - >50% relief  B MBB L4-5 and L5-S1 12/18/23, 1/22/24      Date of board of pharmacy review:7/16/2025  Date of opioid risk screening/ pain psych: None  Date of opioid agreement and consent: None  Date of urine drug screen: None  Date of random pill count: None     was reviewed today: reviewed, no concerns     Prescribed medications: None    See EHR for  PMH, PSH, FH, SH, Medications and Allergy    ROS:  Positive for pain  ROS     PE:  There were no vitals filed for this visit.    General: Pleasant, no distress  HEENT: NC/ AT. PERRLA  CV: Radial pulses intact  Pulm: No distress  Ext: No edema    Physical Exam     Neuromusculoskeletal:  Head: NC, AT. PERRLA  Neck: Intact range of motions, extension, flexion, rotation. Neg Facet loading. Neg Spurling. Min Tenderness.  5/5 Strength, normal tone  Shoulder: Intact range of motion  Lumbar: Intact range of motion. Pos Facet loading  bilaterally. Min Tenderness. Neg SL   Hip: Intact range of motion  SI: Level, Min tenderness  Knee: Intact range of motion  Reflexes: normal Knee  Strength: 5/5 globally   Sensory: Grossly intact   Skin: No bruising, erythema  Gait: Normal      Impression:  R leg pain  Back pain  Relevant History  BMI 38.26  Anxiety   RLS  Parkinson's  Vit D deficiency     Assessment:  Lumbar radiculopathy - R paracentral disc herniation L4-5. Has had 2x MARIA FERNANDA's which have helped but has not provided permanent relief and pt here to discuss options   Axial back pain - Pt w/ component of axial back pain that has responded to B RFA L4-5 and L5-S1   Lumbar spondylosis       Plan:  Discussed options  Imaging/ relevant records viewed/ reviewed/ discussed  Imaging results viewed and reviewed (noted above)/ reviewed with patient   reviewed  Cont HEP  Update MR L-spine reviewed showing continued R sided disc protrusion at L4-5  We discussed prn R TFESI L4-5 and L5-S1 vs ILESI L4-5 which have helped provide some relief. We also discussed a surgery referral for a more permanent solution which is what pt would prefer. Will provide referral to Dr. Reid.   Re-eval after  B RFA L4-5 and L5-S1 prn axial pain  Consider SCS trial if not a surgical candidate      Prescribed medications:  1. Ibuprofen     The impression and plan were discussed and explained in detail. All the questions were answered. Education was provided accordingly.     The procedure was explained in detail, along with risks and potential side effects.        Follow-up:  3 wks or sooner if needed    Fabienne Donaldson MD

## 2025-07-17 NOTE — TELEPHONE ENCOUNTER
Pt would like a referral to Dr Reid to discuss surgery please write referral if appropriate. Thank you

## 2025-07-21 ENCOUNTER — OFFICE VISIT (OUTPATIENT)
Dept: ORTHOPEDICS | Facility: CLINIC | Age: 70
End: 2025-07-21
Payer: MEDICARE

## 2025-07-21 ENCOUNTER — HOSPITAL ENCOUNTER (OUTPATIENT)
Dept: PREADMISSION TESTING | Facility: HOSPITAL | Age: 70
Discharge: HOME | End: 2025-07-21
Attending: ORTHOPAEDIC SURGERY
Payer: MEDICARE

## 2025-07-21 ENCOUNTER — HOSPITAL ENCOUNTER (OUTPATIENT)
Dept: RADIOLOGY | Facility: HOSPITAL | Age: 70
Discharge: HOME | End: 2025-07-21
Attending: ORTHOPAEDIC SURGERY
Payer: MEDICARE

## 2025-07-21 VITALS — WEIGHT: 178 LBS | BODY MASS INDEX: 31.54 KG/M2 | HEIGHT: 63 IN

## 2025-07-21 DIAGNOSIS — Z01.818 PRE-OP TESTING: ICD-10-CM

## 2025-07-21 DIAGNOSIS — M47.816 FACET ARTHRITIS OF LUMBAR REGION: ICD-10-CM

## 2025-07-21 DIAGNOSIS — M54.16 LUMBAR RADICULOPATHY: ICD-10-CM

## 2025-07-21 DIAGNOSIS — M47.812 FACET ARTHRITIS OF CERVICAL REGION: ICD-10-CM

## 2025-07-21 DIAGNOSIS — M51.16 LUMBAR DISC HERNIATION WITH RADICULOPATHY: ICD-10-CM

## 2025-07-21 DIAGNOSIS — M51.16 LUMBAR DISC HERNIATION WITH RADICULOPATHY: Primary | ICD-10-CM

## 2025-07-21 DIAGNOSIS — M51.26 LUMBAR DISC HERNIATION: Primary | ICD-10-CM

## 2025-07-21 LAB
ABSOLUTE EOSINOPHIL (SMH): 0.31 K/UL
ABSOLUTE MONOCYTE (SMH): 0.55 K/UL (ref 0.3–1)
ABSOLUTE NEUTROPHIL COUNT (SMH): 5.5 K/UL (ref 1.8–7.7)
ALBUMIN SERPL-MCNC: 4.1 G/DL (ref 3.5–5.2)
ALP SERPL-CCNC: 81 UNIT/L (ref 40–150)
ALT SERPL-CCNC: 15 UNIT/L (ref 10–44)
ANION GAP (SMH): 9 MMOL/L (ref 8–16)
AST SERPL-CCNC: 22 UNIT/L (ref 11–45)
BASOPHILS # BLD AUTO: 0.06 K/UL
BASOPHILS NFR BLD AUTO: 0.7 %
BILIRUB SERPL-MCNC: 0.5 MG/DL (ref 0.1–1)
BILIRUB UR QL STRIP.AUTO: NEGATIVE
BUN SERPL-MCNC: 16 MG/DL (ref 8–23)
CALCIUM SERPL-MCNC: 9.4 MG/DL (ref 8.7–10.5)
CHLORIDE SERPL-SCNC: 108 MMOL/L (ref 95–110)
CLARITY UR: ABNORMAL
CO2 SERPL-SCNC: 27 MMOL/L (ref 23–29)
COLOR UR AUTO: YELLOW
CREAT SERPL-MCNC: 0.9 MG/DL (ref 0.5–1.4)
ERYTHROCYTE [DISTWIDTH] IN BLOOD BY AUTOMATED COUNT: 13.3 % (ref 11.5–14.5)
GFR SERPLBLD CREATININE-BSD FMLA CKD-EPI: >60 ML/MIN/1.73/M2
GLUCOSE SERPL-MCNC: 125 MG/DL (ref 70–110)
GLUCOSE UR QL STRIP: NEGATIVE
HCT VFR BLD AUTO: 43.1 % (ref 37–48.5)
HGB BLD-MCNC: 13.9 GM/DL (ref 12–16)
HGB UR QL STRIP: ABNORMAL
IMM GRANULOCYTES # BLD AUTO: 0.03 K/UL (ref 0–0.04)
IMM GRANULOCYTES NFR BLD AUTO: 0.3 % (ref 0–0.5)
KETONES UR QL STRIP: NEGATIVE
LEUKOCYTE ESTERASE UR QL STRIP: NEGATIVE
LYMPHOCYTES # BLD AUTO: 2.17 K/UL (ref 1–4.8)
MCH RBC QN AUTO: 29.1 PG (ref 27–31)
MCHC RBC AUTO-ENTMCNC: 32.3 G/DL (ref 32–36)
MCV RBC AUTO: 90 FL (ref 82–98)
MICROSCOPIC COMMENT: ABNORMAL
NITRITE UR QL STRIP: NEGATIVE
NUCLEATED RBC (/100WBC) (SMH): 0 /100 WBC
PH UR STRIP: 6 [PH]
PLATELET # BLD AUTO: 257 K/UL (ref 150–450)
PMV BLD AUTO: 10.2 FL (ref 9.2–12.9)
POTASSIUM SERPL-SCNC: 3.9 MMOL/L (ref 3.5–5.1)
PROT SERPL-MCNC: 7.6 GM/DL (ref 6–8.4)
PROT UR QL STRIP: NEGATIVE
RBC # BLD AUTO: 4.77 M/UL (ref 4–5.4)
RBC #/AREA URNS AUTO: 4 /HPF
RELATIVE EOSINOPHIL (SMH): 3.6 % (ref 0–8)
RELATIVE LYMPHOCYTE (SMH): 25.1 % (ref 18–48)
RELATIVE MONOCYTE (SMH): 6.4 % (ref 4–15)
RELATIVE NEUTROPHIL (SMH): 63.9 % (ref 38–73)
SODIUM SERPL-SCNC: 144 MMOL/L (ref 136–145)
SP GR UR STRIP: >=1.03
SQUAMOUS #/AREA URNS AUTO: 9 /HPF
UROBILINOGEN UR STRIP-ACNC: NEGATIVE EU/DL
WBC # BLD AUTO: 8.65 K/UL (ref 3.9–12.7)
WBC #/AREA URNS AUTO: 8 /HPF
YEAST UR QL AUTO: ABNORMAL

## 2025-07-21 PROCEDURE — 99203 OFFICE O/P NEW LOW 30 MIN: CPT | Mod: S$PBB,,, | Performed by: ORTHOPAEDIC SURGERY

## 2025-07-21 PROCEDURE — 71046 X-RAY EXAM CHEST 2 VIEWS: CPT | Mod: TC

## 2025-07-21 PROCEDURE — 99999 PR PBB SHADOW E&M-EST. PATIENT-LVL V: CPT | Mod: PBBFAC,,, | Performed by: ORTHOPAEDIC SURGERY

## 2025-07-21 PROCEDURE — 85025 COMPLETE CBC W/AUTO DIFF WBC: CPT | Performed by: ORTHOPAEDIC SURGERY

## 2025-07-21 PROCEDURE — 36415 COLL VENOUS BLD VENIPUNCTURE: CPT | Performed by: ORTHOPAEDIC SURGERY

## 2025-07-21 PROCEDURE — 81003 URINALYSIS AUTO W/O SCOPE: CPT | Performed by: ORTHOPAEDIC SURGERY

## 2025-07-21 PROCEDURE — 93005 ELECTROCARDIOGRAM TRACING: CPT

## 2025-07-21 PROCEDURE — 71046 X-RAY EXAM CHEST 2 VIEWS: CPT | Mod: 26,,, | Performed by: RADIOLOGY

## 2025-07-21 PROCEDURE — 72040 X-RAY EXAM NECK SPINE 2-3 VW: CPT | Mod: TC,PN

## 2025-07-21 PROCEDURE — 93010 ELECTROCARDIOGRAM REPORT: CPT | Mod: ,,, | Performed by: GENERAL PRACTICE

## 2025-07-21 PROCEDURE — 82040 ASSAY OF SERUM ALBUMIN: CPT | Performed by: ORTHOPAEDIC SURGERY

## 2025-07-21 PROCEDURE — 72040 X-RAY EXAM NECK SPINE 2-3 VW: CPT | Mod: 26,,, | Performed by: RADIOLOGY

## 2025-07-21 PROCEDURE — 99215 OFFICE O/P EST HI 40 MIN: CPT | Mod: PBBFAC,25,PN | Performed by: ORTHOPAEDIC SURGERY

## 2025-07-21 RX ORDER — CEFAZOLIN SODIUM 2 G/50ML
2 SOLUTION INTRAVENOUS
OUTPATIENT
Start: 2025-07-21

## 2025-07-21 RX ORDER — CALCIUM CARBONATE 200(500)MG
1 TABLET,CHEWABLE ORAL
COMMUNITY

## 2025-07-21 NOTE — PROGRESS NOTES
Subjective:       Patient ID: Anna Wagoner is a 70 y.o. female.    Chief Complaint: Pain of the Lumbar Spine (Lumbar pain x years, pain down left leg to foot, no numbness, weakness with left leg, limited standing, walking and bending over, pain worse with activity, pain is off and on, and daily, ) and Pain of the Neck (Neck pain x years, pain down left arm, numbness to left arm, weakness to left arm, c/o headaches, )      History of Present Illness    Prior to meeting with the patient I reviewed the medical chart in [facility]. This included reviewing the previous progress notes from our office, review of the patient's last appointment with their primary care provider, review of any visits to the emergency room, and review of any pain management appointments or procedures.   Back and right leg pain for several years also chronic history of neck pain that radiates occasion to the left arm she has undergone 2 rhizotomy is in 2 epidural steroid injections the knee there epidural steroid injection afforded her any significant release of her rid radiculopathy.  Leg greater than back pain.  She has been treated by Dr. Donaldson who referred her here for 2nd surgical opinion.    Current Medications  Current Medications[1]    Allergies  Review of patient's allergies indicates:   Allergen Reactions    Metoprolol succinate Hives and Itching     Hives - wheals       Past Medical History  Past Medical History:   Diagnosis Date    Anxiety     Back pain     Hashimoto's thyroiditis     Hypertension     Neck pain, chronic     Sleep apnea     CPAP       Surgical History  Past Surgical History:   Procedure Laterality Date    BREAST SURGERY      CHOLECYSTECTOMY      DEEP BRAIN STIMULATOR PLACEMENT Left 4/18/2023    Procedure: INSERTION, DEEP BRAIN STIMULATOR;  Surgeon: Bonifacio Kraft MD;  Location: [facility] OR [address] [place] [address];  Service: Neurosurgery;  Laterality: Left;  INSERTION ELECTRODES FOR DEEP BRAIN STIMULATION/ BILATERAL VENTRALIS INTERMEDIUS/  LEFT SIDE FIRST, RIGHT SIDE SECOND/ TEDS & SCD/ MEDTRONICS, SACHI MENCHACA.    DEEP BRAIN STIMULATOR PLACEMENT Right 7/11/2023    Procedure: INSERTION, DEEP BRAIN STIMULATOR;  Surgeon: Bonifacio Kraft MD;  Location: [facility] OR 2ND FLR;  Service: Neurosurgery;  Laterality: Right;  INSERT ELECTRODE FOR DEEP BRAIN STIMULATION/RIGHT VENTRALIS INTERMEDIUS/ TEDS & SCD/MEDTRONICS, PAPITO MORALES.    EPIDURAL STEROID INJECTION INTO LUMBAR SPINE N/A 3/14/2025    Procedure: Injection-steroid-epidural-lumbar;  Surgeon: Fabienne Donaldson MD;  Location: [facility] OR;  Service: Pain Management;  Laterality: N/A;    HYSTERECTOMY      INJECTION OF ANESTHETIC AGENT AROUND MEDIAL BRANCH NERVES INNERVATING LUMBAR FACET JOINT Bilateral 12/18/2023    Procedure: Block-nerve-medial branch-lumbar;  Surgeon: Fabienne Donaldson MD;  Location: [facility] OR;  Service: Anesthesiology;  Laterality: Bilateral;  L4/5 and l5/s1 MBB    INJECTION OF ANESTHETIC AGENT AROUND MEDIAL BRANCH NERVES INNERVATING LUMBAR FACET JOINT Bilateral 1/22/2024    Procedure: Block-nerve-medial branch-lumbar;  Surgeon: Fabienne Donaldson MD;  [facility];  Service: Anesthesiology;  Laterality: Bilateral;  L4/5 and l5/s1 MBB #2    INJECTION, SPINE, LUMBOSACRAL, TRANSFORAMINAL APPROACH Right 5/5/2025    Procedure: INJECTION, SPINE, LUMBOSACRAL, TRANSFORAMINAL APPROACH;  Surgeon: Fabienne Donaldson MD;  Location: [facility] PAIN MANAGEMENT;  Service: Pain Management;  Laterality: Right;    INSERTION OF DEEP BRAIN STIMULATOR GENERATOR Left 4/25/2023    Procedure: INSERTION, PULSE GENERATOR, DEEP BRAIN STIMULATOR;  Surgeon: Bonifacio Kraft MD;  Location: [facility] OR 2ND FLR;  Service: Neurosurgery;  Laterality: Left;  INSERT PULSE GENERATOR FOR DEEP BRAIN STIMULATION/TEDS & SCD/ MEDTRONICS, SACHI MENCHACA.    PLACEMENT OF FIDUCIAL SCREW INTO SPINE N/A 4/18/2023    Procedure: INSERTION, FIDUCIAL SCREW, SKULL/DBS;  Surgeon: Bonifacio Kraft MD;  Location: [facility] OR 2ND FLR;  Service: Neurosurgery;  Laterality: N/A;   APPLICATION OF FIDUCIALS FOR DEEP BRAIN STIMULATION/ TEDS & SCD/ MEDTRONICS, SACHI MENCHACA.    PLACEMENT OF FIDUCIAL SCREW INTO SPINE N/A 7/11/2023    Procedure: INSERTION, FIDUCIAL SCREW, SKULL/DBS;  Surgeon: Bonifacio Kraft MD;  Location: [facility] OR [address] [place] [address];  Service: Neurosurgery;  Laterality: N/A;  INSERTION FIDUCIALS FOR DEEP BRAIN STIMULATION/ TEDS & SCD/ MEDTRONICS, PAPITO MORALES.    RADIOFREQUENCY ABLATION OF LUMBAR MEDIAL BRANCH NERVE AT SINGLE LEVEL Bilateral 2/12/2024    Procedure: Radiofrequency Ablation, Nerve, Spinal, Lumbar, Medial Branch, 1 Level;  Surgeon: Fabienne Donaldson MD;  Location: [facility] OR;  Service: Anesthesiology;  Laterality: Bilateral;  L4/5 and L5/s1 ( first or 2nd case please)    RADIOFREQUENCY ABLATION OF LUMBAR MEDIAL BRANCH NERVE AT SINGLE LEVEL Bilateral 11/18/2024    Procedure: Radiofrequency Ablation, Nerve, Spinal, Lumbar, Medial Branch, 1 Level;  Surgeon: Fabienne Donaldson MD;  Location: [facility] OR;  Service: Pain Management;  Laterality: Bilateral;    TONSILLECTOMY         Family History:   Family History   Problem Relation Name Age of Onset    Tremor Mother         Social History:   Social History[2]    Hospitalization/Major Diagnostic Procedure:     Review of Systems     General/Constitutional:  Chills denies. Fatigue denies. Fever denies. Weight gain denies. Weight loss denies.    Respiratory:  Shortness of breath denies.    Cardiovascular:  Chest pain denies.    Gastrointestinal:  Constipation denies. Diarrhea denies. Nausea denies. Vomiting denies.     Hematology:  Easy bruising denies. Prolonged bleeding denies.     Genitourinary:  Frequent urination denies. Pain in lower back denies. Painful urination denies.     Musculoskeletal:  See HPI for details    Skin:  Rash denies.    Neurologic:  Dizziness denies. Gait abnormalities denies. Seizures denies. Tingling/Numbess denies.    Psychiatric:  Anxiety denies. Depressed mood denies.     Objective:   Vital Signs: There were no vitals  filed for this visit.     Physical Exam      General Examination:     Constitutional: The patient is alert and oriented to lace person and time. Mood is pleasant.     Head/Face: Normal facial features normal eyebrows    Eyes: Normal extraocular motion bilaterally    Lungs: Respirations are equal and unlabored    Gait is coordinated.    Cardiovascular: There are no swelling or varicosities present.    Lymphatic: Negative for adenopathy    Skin: Normal    Neurological: Level of consciousness normal. Oriented to place person and time and situation    Psychiatric: Oriented to time place person and situation    Lumbar range of motion restricted tenderness at the lumbosacral junction range of motion limited due to pain straight-leg-raising positive on the right side motor exam normal    XRAY Report/ Interpretation :  Lumbar MRI personally reviewed right-sided disc herniation L4-5.  Looking back through her records an MRI from 2023 also showed a right-sided L4-5 disc herniation     7/12/2025 Routine     Narrative & Impression  EXAMINATION:  MRI LUMBAR SPINE WITHOUT CONTRAST     CLINICAL HISTORY:  Low back pain, symptoms persist with > 6wks conservative treatment; Dorsalgia, unspecified     TECHNIQUE:  Multiplanar, multi-sequence MR images were acquired from the thoracolumbar junction to the sacrum without the administration of contrast.     COMPARISON:  None.     FINDINGS:  The lumbar vertebral bodies show normal height and signal intensity without evidence of acute compression fracture or pathologic marrow replacement process.  There is an incidentally observed L4 vertebral body hemangioma within the right posterolateral aspect of L4.  No spondylolisthesis or pars defects. There is degenerative disc desiccation and disc space narrowing present at multiple lumbar levels, most pronounced at T12-L1, L1-L2, and L5-S1.  There are Modic type II endplate changes observed at the inferior endplate of L2 and L5 and superior  endplate of L3 and S1.  There are Modic type I endplate changes noted at the inferior endplate of T12 and superior endplate of L1.     The conus medullaris terminates at L1-L2.  The visualized lower thoracic spinal cord is normal in signal intensity with no evidence of cord edema, myelomalacia, or cord syrinx.  No epidural fluid collections or masses.  The paraspinous musculature is unremarkable.     The incidentally observed abdominal/retroperitoneal soft tissues demonstrate no significant abnormalities.     T11-T12: There is a broad central disc protrusion, visible only on the sagittal images.     T12-L1: There is a broad disc bulge and mild ligamentum flavum thickening.  There is minor right facet arthropathy. No disc protrusion or extrusion. No central canal stenosis or neuroforaminal stenosis.     L1-L2: There is a broad disc bulge and ligamentum flavum thickening.  No disc protrusion or extrusion. No central canal stenosis or neuroforaminal stenosis.     L2-L3: There is a broad disc bulge and ligamentum flavum thickening.  There is bilateral facet arthropathy present.  No overall central canal stenosis.     L3-L4: There is ligamentum flavum thickening and bilateral facet arthropathy.  There is a broad disc bulge no central canal stenosis or neuroforaminal stenosis.  No disc protrusion or extrusion.     L4-L5: There is a broad disc bulge. There is a broad right paracentral disc protrusion with an annular fissure (series 7, image 31 and series 4, image 11) which exerts mass effect upon the descending right L5 nerve in the right lateral recess at L4-L5. Please correlate clinically for symptoms referable to the right L5 nerve. No overall central canal stenosis or neuroforaminal stenosis.  There is ligamentum flavum thickening and bilateral hypertrophic degenerative facet arthropathy.     L5-S1: There is a grade I retrolisthesis of L5 on S1.  There is ligamentum flavum thickening and bilateral facet arthropathy.   "No overall central canal stenosis.  There is a broad disc bulge which extends into both neural foramina.     Impression:     1. Multilevel degenerative change of the lumbar spine, most pronounced at L4-L5 where a broad right paracentral disc protrusion with an annular fissure exerts mass effect upon and posteriorly displaces the descending right L5 nerve in the right lateral recess at L4-L5. please correlate clinically for symptoms referable to the right L5 nerve.  2. Grade I retrolisthesis of L5 on S1        Electronically signed by:Gagandeep Reynolds MD  Date:                                            07/12/2025  Time:                                           21:10        Exam Ended: 07/12/25 11:26 CDT         Assessment:       1. Lumbar disc herniation    2. Lumbar radiculopathy    3. Facet arthritis of lumbar region    4. Facet arthritis of cervical region        Plan:       Anna Huff" was seen today for pain and pain.    Diagnoses and all orders for this visit:    Lumbar disc herniation    Lumbar radiculopathy  -     Ambulatory referral/consult to Orthopedics    Facet arthritis of lumbar region    Facet arthritis of cervical region  -     X-Ray Cervical Spine AP And Lateral         No follow-ups on file.    Chronic right-sided L4-5 disc herniation at has failed conservative measures including 2 epidural steroid injections into rhizotomy is.  Treatment options were discussed lately observation repeat epidural steroid injections spinal cord stimulator or surgery.  She is looking for certain a permanent solution.  A right L4-5 laminotomy diskectomy and nerve root decompression has been advised    Because her chronic radicular symptoms have been present for several years patient has been cautioned however that she will likely not experience complete relief of her radicular symptoms but more likely than not significant relief with the proposed surgery she clearly understands not to expect complete relief.  The " procedure risks benefits complications alternatives have been discussed surgery was scheduled at the patient's convenience  The technical aspects of the surgery were discussed in detail with the patient today. The patient was able to verbalize an understanding. The procedure risk benefits alternatives and possible complications of the surgical procedure was discussed including expected outcomes. No guarantees were given regards to outcomes. Consent forms were will be signed at a later date. All questions regarding the surgery itself were answered. The patient wishes to proceed with surgery and will be scheduled. The patient may require preoperative medical clearance.  Treatment options were discussed with regards to the nature of the medical condition. Conservative pain intervention and surgical options were discussed in detail. The probability of success of each separate treatment option was discussed. The patient expressed a clear understanding of the treatment options. With regards to surgery, the procedure risk, benefits, complications, and outcomes were discussed. No guarantees were given with regards to surgical outcome.   The risk of complications, morbidity, and mortality of patient management decisions have been made at the time of this visit. These are associated with the patient's problems, diagnostic procedures and treatment options. This includes the possible management options selected and those considered but not selected by the patient after shared medical decision making we discussed with the patient.     This note was created using Dragon voice recognition software that occasionally misinterpreted phrases or words.           [1]   Current Outpatient Medications   Medication Sig Dispense Refill    buPROPion (WELLBUTRIN XL) 300 MG 24 hr tablet Take 300 mg by mouth daily as needed.      butalbital-acetaminophen-caff -40 mg Cap       carvediloL (COREG) 12.5 MG tablet Take 12.5 mg by mouth 2 (two)  times daily.      cholecalciferol, vitamin D3, 75 mcg (3,000 unit) Tab Take 1 tablet by mouth once daily.      co-enzyme Q-10 30 mg capsule Take 30 mg by mouth once daily.      ergocalciferol (ERGOCALCIFEROL) 50,000 unit Cap Take 50,000 Units by mouth every 7 days.      esomeprazole (NEXIUM) 40 MG capsule Take 40 mg by mouth every morning.      famotidine (PEPCID) 20 MG tablet Take 20 mg by mouth nightly.      ibuprofen (ADVIL,MOTRIN) 800 MG tablet Take 1 tablet (800 mg total) by mouth 2 (two) times daily as needed for Pain. 60 tablet 2    levothyroxine (SYNTHROID) 112 MCG tablet Take 112 mcg by mouth.      liothyronine (CYTOMEL) 5 MCG Tab Take 5 mcg by mouth.      rosuvastatin (CRESTOR) 10 MG tablet Take 10 mg by mouth every evening.      sertraline (ZOLOFT) 50 MG tablet Take 50 mg by mouth every evening.      tirzepatide, weight loss, 7.5 mg/0.5 mL PnIj Inject into the skin every 7 days.      tiZANidine (ZANAFLEX) 4 MG tablet Take 1 tablet (4 mg total) by mouth every 8 (eight) hours. (Patient taking differently: Take 4 mg by mouth nightly. 2 nightly) 90 tablet 12    DULoxetine (CYMBALTA) 20 MG capsule Take 20 mg by mouth once daily.      ondansetron (ZOFRAN-ODT) 4 MG TbDL Dissolve 1 tablet (4 mg total) by mouth every 8 (eight) hours as needed (nausea). 30 tablet 0    pantoprazole (PROTONIX) 40 MG tablet Take 40 mg by mouth.      tiZANidine (ZANAFLEX) 4 MG tablet Take 4 mg by mouth nightly.      tiZANidine 4 mg Cap Take 4 mg by mouth once daily.       Current Facility-Administered Medications   Medication Dose Route Frequency Provider Last Rate Last Admin    ketorolac injection 30 mg  30 mg Intramuscular 1 time in Clinic/HOD          Facility-Administered Medications Ordered in Other Visits   Medication Dose Route Frequency Provider Last Rate Last Admin    lactated ringers infusion   Intravenous Continuous Fabienne Donaldson MD        LIDOcaine (PF) 10 mg/ml (1%) injection 10 mg  1 mL Intradermal Once Fabienne Donaldson MD        [2]   Social History  Socioeconomic History    Marital status:    Tobacco Use    Smoking status: Former     Types: Cigarettes     Passive exposure: Never    Smokeless tobacco: Never     Social Drivers of Health     Financial Resource Strain: Low Risk  (3/7/2025)    Overall Financial Resource Strain (CARDIA)     Difficulty of Paying Living Expenses: Not very hard   Food Insecurity: No Food Insecurity (3/7/2025)    Hunger Vital Sign     Worried About Running Out of Food in the Last Year: Never true     Ran Out of Food in the Last Year: Never true   Transportation Needs: No Transportation Needs (3/7/2025)    PRAPARE - Transportation     Lack of Transportation (Medical): No     Lack of Transportation (Non-Medical): No   Physical Activity: Inactive (3/7/2025)    Exercise Vital Sign     Days of Exercise per Week: 0 days     Minutes of Exercise per Session: 0 min   Stress: No Stress Concern Present (3/7/2025)    [employer] - Occupational Stress Questionnaire     Feeling of Stress : Only a little   Housing Stability: Low Risk  (3/7/2025)    Housing Stability Vital Sign     Unable to Pay for Housing in the Last Year: No     Number of Times Moved in the Last Year: 0     Homeless in the Last Year: No

## 2025-07-21 NOTE — DISCHARGE INSTRUCTIONS
To confirm, Your doctor has instructed you that surgery is scheduled for: 8/7/25 WITH DR. FREDERICK    Please report to [facility], Registration the morning of surgery. You must check-in and receive a wristband before going to your procedure.  [address] [place] [address] [place] [address]    Pre-Op will call the afternoon prior to surgery between 1:00 and 6:00 PM with the final arrival time.  Phone number: [phone]    PLEASE NOTE:  The surgery schedule has many variables which may affect the time of your surgery case.  Family members should be available if your surgery time changes.  Plan to be here the day of your procedure between 4-6 hours.    MEDICATIONS:  TAKE ONLY THESE MEDICATIONS WITH A SMALL SIP OF WATER THE MORNING OF YOUR PROCEDURE:  SEE MED LIST      DO NOT TAKE THESE MEDICATIONS 5-7 DAYS PRIOR to your procedure or per your surgeon's request:   ASPIRIN, ALEVE, ADVIL, IBUPROFEN, FISH OIL VITAMIN E, HERBALS  (May take Tylenol)    ONLY if you are prescribed any types of blood thinners such as:  Aspirin, Coumadin, Plavix, Pradaxa, Xarelto, Aggrenox, Effient, Eliquis, Savasya, Brilinta, or any other, ask your surgeon whether you should stop taking them and how long before surgery you should stop.  You may also need to verify with the prescribing physician if it is ok to stop your medication.      INSTRUCTIONS IMPORTANT!!  Do not eat or drink anything between midnight and the time of your procedure- this includes gum, mints, and candy.  EXCEPT: you may have clear liquids such as:  WATER, BLACK COFFEE, UNSWEET TEA, OR GATORADE (NO RED OR PURPLE) UP TO 2 HOURS PRIOR TO YOUR ARRIVAL TIME.  Do not smoke or drink alcoholic beverages 24 hours prior to your procedure.  Shower the night before AND the morning of your procedure with a Chlorhexidine wash such as Hibiclens or Dial antibacterial soap from the neck down.  Do not get it on your face or in your eyes.  You may use your own shampoo and face  wash. This helps your skin to be as bacteria free as possible.    If you wear contact lenses, dentures, hearing aids or glasses, bring a container to put them in during surgery and give to a family member for safe keeping.  Please leave all jewelry, piercing's and valuables at home. You must remove your false eyelashes prior to surgery.    DO NOT remove hair from the surgery site.  Do not shave the incision site unless you are given specific instructions to do so.    ONLY if you have been diagnosed with sleep apnea please bring your C-PAP machine.  ONLY if you wear home oxygen please bring your portable oxygen tank the day of your procedure.  ONLY if you have a history of OPEN HEART SURGERY you will need a clearance from your Cardiologist per Anesthesia.      ONLY for patients requiring bowel prep, written instructions will be given by your doctor's office.  ONLY if you have any type of stimulator implant or any type of implanted device with a remote control.  Please bring the controller with you the morning of surgery  If your doctor has scheduled you for an overnight stay, bring a small overnight bag with any personal items you need.  Make arrangements in advance for transportation home by a responsible adult. You can not go home in an uber or a cab per hospital policy.  It is not safe to drive a vehicle during the 24 hours after anesthesia.          All  facilities and properties are tobacco free.  Smoking is NOT allowed.   If you have any questions about these instructions, call Pre-Op Admit  Nursing at [phone] or the Pre-Op Day Surgery Unit at [phone].

## 2025-07-22 ENCOUNTER — PATIENT MESSAGE (OUTPATIENT)
Dept: PAIN MEDICINE | Facility: CLINIC | Age: 70
End: 2025-07-22
Payer: MEDICARE

## 2025-07-24 ENCOUNTER — PATIENT MESSAGE (OUTPATIENT)
Dept: ORTHOPEDICS | Facility: CLINIC | Age: 70
End: 2025-07-24
Payer: MEDICARE

## 2025-07-25 LAB
OHS QRS DURATION: 74 MS
OHS QTC CALCULATION: 422 MS

## 2025-08-06 ENCOUNTER — ANESTHESIA EVENT (OUTPATIENT)
Dept: SURGERY | Facility: HOSPITAL | Age: 70
End: 2025-08-06
Payer: MEDICARE

## 2025-08-07 ENCOUNTER — HOSPITAL ENCOUNTER (OUTPATIENT)
Facility: HOSPITAL | Age: 70
Discharge: HOME HEALTH | End: 2025-08-08
Attending: ORTHOPAEDIC SURGERY
Payer: MEDICARE

## 2025-08-07 ENCOUNTER — ANESTHESIA (OUTPATIENT)
Dept: SURGERY | Facility: HOSPITAL | Age: 70
End: 2025-08-07
Payer: MEDICARE

## 2025-08-07 DIAGNOSIS — R07.9 CHEST PAIN: ICD-10-CM

## 2025-08-07 DIAGNOSIS — Z98.890 S/P DISKECTOMY: Primary | ICD-10-CM

## 2025-08-07 DIAGNOSIS — Z01.818 PRE-OP TESTING: ICD-10-CM

## 2025-08-07 DIAGNOSIS — M47.816 FACET ARTHRITIS OF LUMBAR REGION: ICD-10-CM

## 2025-08-07 DIAGNOSIS — M51.16 LUMBAR DISC HERNIATION WITH RADICULOPATHY: ICD-10-CM

## 2025-08-07 LAB
ABSOLUTE EOSINOPHIL (SMH): 0.19 K/UL
ABSOLUTE MONOCYTE (SMH): 0.34 K/UL (ref 0.3–1)
ABSOLUTE NEUTROPHIL COUNT (SMH): 4.3 K/UL (ref 1.8–7.7)
ANION GAP (SMH): 10 MMOL/L (ref 8–16)
BASOPHILS # BLD AUTO: 0.05 K/UL
BASOPHILS NFR BLD AUTO: 0.8 %
BUN SERPL-MCNC: 16 MG/DL (ref 8–23)
CALCIUM SERPL-MCNC: 8.6 MG/DL (ref 8.7–10.5)
CHLORIDE SERPL-SCNC: 107 MMOL/L (ref 95–110)
CO2 SERPL-SCNC: 26 MMOL/L (ref 23–29)
CREAT SERPL-MCNC: 0.8 MG/DL (ref 0.5–1.4)
ERYTHROCYTE [DISTWIDTH] IN BLOOD BY AUTOMATED COUNT: 13 % (ref 11.5–14.5)
GFR SERPLBLD CREATININE-BSD FMLA CKD-EPI: >60 ML/MIN/1.73/M2
GLUCOSE SERPL-MCNC: 103 MG/DL (ref 70–110)
HCT VFR BLD AUTO: 39.4 % (ref 37–48.5)
HGB BLD-MCNC: 12.9 GM/DL (ref 12–16)
IMM GRANULOCYTES # BLD AUTO: 0.02 K/UL (ref 0–0.04)
IMM GRANULOCYTES NFR BLD AUTO: 0.3 % (ref 0–0.5)
LYMPHOCYTES # BLD AUTO: 1.57 K/UL (ref 1–4.8)
MCH RBC QN AUTO: 29.1 PG (ref 27–31)
MCHC RBC AUTO-ENTMCNC: 32.7 G/DL (ref 32–36)
MCV RBC AUTO: 89 FL (ref 82–98)
NUCLEATED RBC (/100WBC) (SMH): 0 /100 WBC
PLATELET # BLD AUTO: 214 K/UL (ref 150–450)
PMV BLD AUTO: 10.4 FL (ref 9.2–12.9)
POTASSIUM SERPL-SCNC: 4.7 MMOL/L (ref 3.5–5.1)
RBC # BLD AUTO: 4.43 M/UL (ref 4–5.4)
RELATIVE EOSINOPHIL (SMH): 3 % (ref 0–8)
RELATIVE LYMPHOCYTE (SMH): 24.5 % (ref 18–48)
RELATIVE MONOCYTE (SMH): 5.3 % (ref 4–15)
RELATIVE NEUTROPHIL (SMH): 66.1 % (ref 38–73)
SODIUM SERPL-SCNC: 143 MMOL/L (ref 136–145)
TROPONIN I SERPL HS-MCNC: <3 NG/L
WBC # BLD AUTO: 6.42 K/UL (ref 3.9–12.7)

## 2025-08-07 PROCEDURE — 80048 BASIC METABOLIC PNL TOTAL CA: CPT | Performed by: ORTHOPAEDIC SURGERY

## 2025-08-07 PROCEDURE — 37000008 HC ANESTHESIA 1ST 15 MINUTES: Performed by: ORTHOPAEDIC SURGERY

## 2025-08-07 PROCEDURE — 85025 COMPLETE CBC W/AUTO DIFF WBC: CPT | Performed by: ORTHOPAEDIC SURGERY

## 2025-08-07 PROCEDURE — 94799 UNLISTED PULMONARY SVC/PX: CPT

## 2025-08-07 PROCEDURE — 71000033 HC RECOVERY, INTIAL HOUR: Performed by: ORTHOPAEDIC SURGERY

## 2025-08-07 PROCEDURE — 84484 ASSAY OF TROPONIN QUANT: CPT

## 2025-08-07 PROCEDURE — 63030 LAMOT DCMPRN NRV RT 1 LMBR: CPT | Mod: RT,,, | Performed by: ORTHOPAEDIC SURGERY

## 2025-08-07 PROCEDURE — 25000003 PHARM REV CODE 250: Performed by: NURSE ANESTHETIST, CERTIFIED REGISTERED

## 2025-08-07 PROCEDURE — 63600175 PHARM REV CODE 636 W HCPCS: Performed by: ANESTHESIOLOGY

## 2025-08-07 PROCEDURE — 37000009 HC ANESTHESIA EA ADD 15 MINS: Performed by: ORTHOPAEDIC SURGERY

## 2025-08-07 PROCEDURE — 25000003 PHARM REV CODE 250: Performed by: ANESTHESIOLOGY

## 2025-08-07 PROCEDURE — 99900035 HC TECH TIME PER 15 MIN (STAT)

## 2025-08-07 PROCEDURE — 36415 COLL VENOUS BLD VENIPUNCTURE: CPT | Performed by: ORTHOPAEDIC SURGERY

## 2025-08-07 PROCEDURE — 63600175 PHARM REV CODE 636 W HCPCS: Performed by: ORTHOPAEDIC SURGERY

## 2025-08-07 PROCEDURE — 63600175 PHARM REV CODE 636 W HCPCS

## 2025-08-07 PROCEDURE — 25000003 PHARM REV CODE 250: Performed by: ORTHOPAEDIC SURGERY

## 2025-08-07 PROCEDURE — 94761 N-INVAS EAR/PLS OXIMETRY MLT: CPT

## 2025-08-07 PROCEDURE — 36000710: Performed by: ORTHOPAEDIC SURGERY

## 2025-08-07 PROCEDURE — 27000221 HC OXYGEN, UP TO 24 HOURS

## 2025-08-07 PROCEDURE — 88304 TISSUE EXAM BY PATHOLOGIST: CPT | Mod: TC | Performed by: PATHOLOGY

## 2025-08-07 PROCEDURE — 71000039 HC RECOVERY, EACH ADD'L HOUR: Performed by: ORTHOPAEDIC SURGERY

## 2025-08-07 PROCEDURE — 36000711: Performed by: ORTHOPAEDIC SURGERY

## 2025-08-07 PROCEDURE — 63600175 PHARM REV CODE 636 W HCPCS: Performed by: NURSE ANESTHETIST, CERTIFIED REGISTERED

## 2025-08-07 PROCEDURE — 99900031 HC PATIENT EDUCATION (STAT)

## 2025-08-07 RX ORDER — OXYCODONE HYDROCHLORIDE 10 MG/1
10 TABLET ORAL EVERY 4 HOURS PRN
Status: DISCONTINUED | OUTPATIENT
Start: 2025-08-07 | End: 2025-08-08 | Stop reason: HOSPADM

## 2025-08-07 RX ORDER — ONDANSETRON HYDROCHLORIDE 2 MG/ML
INJECTION, SOLUTION INTRAMUSCULAR; INTRAVENOUS
Status: DISCONTINUED | OUTPATIENT
Start: 2025-08-07 | End: 2025-08-07

## 2025-08-07 RX ORDER — BISACODYL 10 MG/1
10 SUPPOSITORY RECTAL DAILY
Status: DISCONTINUED | OUTPATIENT
Start: 2025-08-07 | End: 2025-08-08 | Stop reason: HOSPADM

## 2025-08-07 RX ORDER — CEFAZOLIN 2 G/1
2 INJECTION, POWDER, FOR SOLUTION INTRAMUSCULAR; INTRAVENOUS
Status: COMPLETED | OUTPATIENT
Start: 2025-08-07 | End: 2025-08-08

## 2025-08-07 RX ORDER — ACETAMINOPHEN 325 MG/1
650 TABLET ORAL EVERY 4 HOURS PRN
Status: DISCONTINUED | OUTPATIENT
Start: 2025-08-07 | End: 2025-08-08 | Stop reason: HOSPADM

## 2025-08-07 RX ORDER — OXYCODONE HYDROCHLORIDE 5 MG/1
5 TABLET ORAL EVERY 4 HOURS PRN
Status: DISCONTINUED | OUTPATIENT
Start: 2025-08-07 | End: 2025-08-08 | Stop reason: HOSPADM

## 2025-08-07 RX ORDER — PROCHLORPERAZINE EDISYLATE 5 MG/ML
2.5 INJECTION INTRAMUSCULAR; INTRAVENOUS EVERY 6 HOURS PRN
Status: DISCONTINUED | OUTPATIENT
Start: 2025-08-07 | End: 2025-08-07

## 2025-08-07 RX ORDER — AMOXICILLIN 250 MG
1 CAPSULE ORAL DAILY
Status: DISCONTINUED | OUTPATIENT
Start: 2025-08-07 | End: 2025-08-08 | Stop reason: HOSPADM

## 2025-08-07 RX ORDER — PROPOFOL 10 MG/ML
VIAL (ML) INTRAVENOUS
Status: DISCONTINUED | OUTPATIENT
Start: 2025-08-07 | End: 2025-08-07

## 2025-08-07 RX ORDER — MUPIROCIN 20 MG/G
OINTMENT TOPICAL 2 TIMES DAILY
Status: DISCONTINUED | OUTPATIENT
Start: 2025-08-07 | End: 2025-08-08 | Stop reason: HOSPADM

## 2025-08-07 RX ORDER — ACETAMINOPHEN 10 MG/ML
INJECTION, SOLUTION INTRAVENOUS
Status: DISCONTINUED | OUTPATIENT
Start: 2025-08-07 | End: 2025-08-07

## 2025-08-07 RX ORDER — SERTRALINE HYDROCHLORIDE 50 MG/1
50 TABLET, FILM COATED ORAL NIGHTLY
Status: DISCONTINUED | OUTPATIENT
Start: 2025-08-07 | End: 2025-08-08 | Stop reason: HOSPADM

## 2025-08-07 RX ORDER — SODIUM CHLORIDE 0.9 % (FLUSH) 0.9 %
3 SYRINGE (ML) INJECTION
Status: DISCONTINUED | OUTPATIENT
Start: 2025-08-07 | End: 2025-08-07 | Stop reason: HOSPADM

## 2025-08-07 RX ORDER — ONDANSETRON 8 MG/1
8 TABLET, ORALLY DISINTEGRATING ORAL EVERY 6 HOURS PRN
Status: DISCONTINUED | OUTPATIENT
Start: 2025-08-07 | End: 2025-08-08 | Stop reason: HOSPADM

## 2025-08-07 RX ORDER — OXYCODONE AND ACETAMINOPHEN 7.5; 325 MG/1; MG/1
1 TABLET ORAL EVERY 4 HOURS PRN
Qty: 42 TABLET | Refills: 0 | Status: SHIPPED | OUTPATIENT
Start: 2025-08-07 | End: 2025-08-14

## 2025-08-07 RX ORDER — DEXAMETHASONE SODIUM PHOSPHATE 4 MG/ML
INJECTION, SOLUTION INTRA-ARTICULAR; INTRALESIONAL; INTRAMUSCULAR; INTRAVENOUS; SOFT TISSUE
Status: DISCONTINUED | OUTPATIENT
Start: 2025-08-07 | End: 2025-08-07

## 2025-08-07 RX ORDER — HYDROMORPHONE HYDROCHLORIDE 2 MG/ML
0.2 INJECTION, SOLUTION INTRAMUSCULAR; INTRAVENOUS; SUBCUTANEOUS EVERY 5 MIN PRN
Status: DISCONTINUED | OUTPATIENT
Start: 2025-08-07 | End: 2025-08-07 | Stop reason: HOSPADM

## 2025-08-07 RX ORDER — AMOXICILLIN 250 MG
2 CAPSULE ORAL NIGHTLY PRN
Status: DISCONTINUED | OUTPATIENT
Start: 2025-08-07 | End: 2025-08-08 | Stop reason: HOSPADM

## 2025-08-07 RX ORDER — ALUMINUM HYDROXIDE, MAGNESIUM HYDROXIDE, AND SIMETHICONE 1200; 120; 1200 MG/30ML; MG/30ML; MG/30ML
30 SUSPENSION ORAL 4 TIMES DAILY PRN
Status: DISCONTINUED | OUTPATIENT
Start: 2025-08-07 | End: 2025-08-07

## 2025-08-07 RX ORDER — DOCUSATE SODIUM 100 MG/1
100 CAPSULE, LIQUID FILLED ORAL DAILY
Status: DISCONTINUED | OUTPATIENT
Start: 2025-08-07 | End: 2025-08-08 | Stop reason: HOSPADM

## 2025-08-07 RX ORDER — ACETAMINOPHEN 325 MG/1
650 TABLET ORAL EVERY 6 HOURS PRN
Status: DISCONTINUED | OUTPATIENT
Start: 2025-08-08 | End: 2025-08-08 | Stop reason: HOSPADM

## 2025-08-07 RX ORDER — METHYLPREDNISOLONE ACETATE 80 MG/ML
INJECTION, SUSPENSION INTRA-ARTICULAR; INTRALESIONAL; INTRAMUSCULAR; SOFT TISSUE
Status: DISCONTINUED | OUTPATIENT
Start: 2025-08-07 | End: 2025-08-07 | Stop reason: HOSPADM

## 2025-08-07 RX ORDER — ACETAMINOPHEN 325 MG/1
650 TABLET ORAL EVERY 8 HOURS PRN
Status: DISCONTINUED | OUTPATIENT
Start: 2025-08-07 | End: 2025-08-07 | Stop reason: SDUPTHER

## 2025-08-07 RX ORDER — DIPHENHYDRAMINE HCL 25 MG
50 CAPSULE ORAL EVERY 6 HOURS PRN
Status: DISCONTINUED | OUTPATIENT
Start: 2025-08-07 | End: 2025-08-08 | Stop reason: HOSPADM

## 2025-08-07 RX ORDER — PHENYLEPHRINE HYDROCHLORIDE 10 MG/ML
INJECTION INTRAVENOUS
Status: DISCONTINUED | OUTPATIENT
Start: 2025-08-07 | End: 2025-08-07

## 2025-08-07 RX ORDER — SODIUM,POTASSIUM PHOSPHATES 280-250MG
2 POWDER IN PACKET (EA) ORAL
Status: DISCONTINUED | OUTPATIENT
Start: 2025-08-07 | End: 2025-08-08 | Stop reason: HOSPADM

## 2025-08-07 RX ORDER — FENTANYL CITRATE 50 UG/ML
25 INJECTION, SOLUTION INTRAMUSCULAR; INTRAVENOUS EVERY 5 MIN PRN
Status: DISCONTINUED | OUTPATIENT
Start: 2025-08-07 | End: 2025-08-07 | Stop reason: HOSPADM

## 2025-08-07 RX ORDER — LIDOCAINE HYDROCHLORIDE 10 MG/ML
1 INJECTION, SOLUTION EPIDURAL; INFILTRATION; INTRACAUDAL; PERINEURAL ONCE
Status: DISCONTINUED | OUTPATIENT
Start: 2025-08-07 | End: 2025-08-07 | Stop reason: HOSPADM

## 2025-08-07 RX ORDER — SODIUM CHLORIDE, SODIUM LACTATE, POTASSIUM CHLORIDE, CALCIUM CHLORIDE 600; 310; 30; 20 MG/100ML; MG/100ML; MG/100ML; MG/100ML
INJECTION, SOLUTION INTRAVENOUS CONTINUOUS
Status: DISCONTINUED | OUTPATIENT
Start: 2025-08-07 | End: 2025-08-08 | Stop reason: HOSPADM

## 2025-08-07 RX ORDER — SUCCINYLCHOLINE CHLORIDE 20 MG/ML
INJECTION INTRAMUSCULAR; INTRAVENOUS
Status: DISCONTINUED | OUTPATIENT
Start: 2025-08-07 | End: 2025-08-07

## 2025-08-07 RX ORDER — NALOXONE HCL 0.4 MG/ML
0.02 VIAL (ML) INJECTION
Status: DISCONTINUED | OUTPATIENT
Start: 2025-08-07 | End: 2025-08-08 | Stop reason: HOSPADM

## 2025-08-07 RX ORDER — HYDROMORPHONE HYDROCHLORIDE 2 MG/ML
2 INJECTION, SOLUTION INTRAMUSCULAR; INTRAVENOUS; SUBCUTANEOUS
Status: DISCONTINUED | OUTPATIENT
Start: 2025-08-07 | End: 2025-08-08 | Stop reason: HOSPADM

## 2025-08-07 RX ORDER — CEFAZOLIN 2 G/1
2 INJECTION, POWDER, FOR SOLUTION INTRAMUSCULAR; INTRAVENOUS
Status: COMPLETED | OUTPATIENT
Start: 2025-08-07 | End: 2025-08-07

## 2025-08-07 RX ORDER — ALUMINUM HYDROXIDE, MAGNESIUM HYDROXIDE, AND SIMETHICONE 1200; 120; 1200 MG/30ML; MG/30ML; MG/30ML
30 SUSPENSION ORAL EVERY 4 HOURS PRN
Status: DISCONTINUED | OUTPATIENT
Start: 2025-08-07 | End: 2025-08-08 | Stop reason: HOSPADM

## 2025-08-07 RX ORDER — PROCHLORPERAZINE EDISYLATE 5 MG/ML
5 INJECTION INTRAMUSCULAR; INTRAVENOUS EVERY 6 HOURS PRN
Status: DISCONTINUED | OUTPATIENT
Start: 2025-08-07 | End: 2025-08-08 | Stop reason: HOSPADM

## 2025-08-07 RX ORDER — PROCHLORPERAZINE EDISYLATE 5 MG/ML
5 INJECTION INTRAMUSCULAR; INTRAVENOUS EVERY 6 HOURS PRN
Status: DISCONTINUED | OUTPATIENT
Start: 2025-08-07 | End: 2025-08-07 | Stop reason: SDUPTHER

## 2025-08-07 RX ORDER — IBUPROFEN 200 MG
16 TABLET ORAL
Status: DISCONTINUED | OUTPATIENT
Start: 2025-08-07 | End: 2025-08-08 | Stop reason: HOSPADM

## 2025-08-07 RX ORDER — CARVEDILOL 6.25 MG/1
12.5 TABLET ORAL 2 TIMES DAILY
Status: DISCONTINUED | OUTPATIENT
Start: 2025-08-07 | End: 2025-08-08 | Stop reason: HOSPADM

## 2025-08-07 RX ORDER — OXYCODONE HYDROCHLORIDE 5 MG/1
5 TABLET ORAL
Status: DISCONTINUED | OUTPATIENT
Start: 2025-08-07 | End: 2025-08-07 | Stop reason: HOSPADM

## 2025-08-07 RX ORDER — BUPIVACAINE 13.3 MG/ML
INJECTION, SUSPENSION, LIPOSOMAL INFILTRATION
Status: DISCONTINUED | OUTPATIENT
Start: 2025-08-07 | End: 2025-08-07 | Stop reason: HOSPADM

## 2025-08-07 RX ORDER — TALC
9 POWDER (GRAM) TOPICAL NIGHTLY PRN
Status: DISCONTINUED | OUTPATIENT
Start: 2025-08-07 | End: 2025-08-08 | Stop reason: HOSPADM

## 2025-08-07 RX ORDER — ROCURONIUM BROMIDE 10 MG/ML
INJECTION, SOLUTION INTRAVENOUS
Status: DISCONTINUED | OUTPATIENT
Start: 2025-08-07 | End: 2025-08-07

## 2025-08-07 RX ORDER — FENTANYL CITRATE 50 UG/ML
INJECTION, SOLUTION INTRAMUSCULAR; INTRAVENOUS
Status: DISCONTINUED | OUTPATIENT
Start: 2025-08-07 | End: 2025-08-07

## 2025-08-07 RX ORDER — GLUCAGON 1 MG
1 KIT INJECTION
Status: DISCONTINUED | OUTPATIENT
Start: 2025-08-07 | End: 2025-08-08 | Stop reason: HOSPADM

## 2025-08-07 RX ORDER — LIDOCAINE HYDROCHLORIDE 20 MG/ML
INJECTION INTRAVENOUS
Status: DISCONTINUED | OUTPATIENT
Start: 2025-08-07 | End: 2025-08-07

## 2025-08-07 RX ORDER — LIOTHYRONINE SODIUM 5 UG/1
5 TABLET ORAL DAILY
Status: DISCONTINUED | OUTPATIENT
Start: 2025-08-07 | End: 2025-08-08 | Stop reason: HOSPADM

## 2025-08-07 RX ORDER — EPHEDRINE SULFATE 50 MG/ML
INJECTION, SOLUTION INTRAVENOUS
Status: DISCONTINUED | OUTPATIENT
Start: 2025-08-07 | End: 2025-08-07

## 2025-08-07 RX ORDER — HYDROMORPHONE HCL IN 0.9% NACL 6 MG/30 ML
PATIENT CONTROLLED ANALGESIA SYRINGE INTRAVENOUS CONTINUOUS
Refills: 0 | Status: DISCONTINUED | OUTPATIENT
Start: 2025-08-07 | End: 2025-08-08

## 2025-08-07 RX ORDER — ONDANSETRON HYDROCHLORIDE 2 MG/ML
4 INJECTION, SOLUTION INTRAVENOUS EVERY 6 HOURS PRN
Status: DISCONTINUED | OUTPATIENT
Start: 2025-08-07 | End: 2025-08-07 | Stop reason: SDUPTHER

## 2025-08-07 RX ORDER — BUPIVACAINE HYDROCHLORIDE 5 MG/ML
INJECTION, SOLUTION EPIDURAL; INTRACAUDAL; PERINEURAL
Status: DISCONTINUED | OUTPATIENT
Start: 2025-08-07 | End: 2025-08-07 | Stop reason: HOSPADM

## 2025-08-07 RX ORDER — TALC
6 POWDER (GRAM) TOPICAL NIGHTLY PRN
Status: DISCONTINUED | OUTPATIENT
Start: 2025-08-07 | End: 2025-08-07 | Stop reason: SDUPTHER

## 2025-08-07 RX ORDER — ATORVASTATIN CALCIUM 10 MG/1
10 TABLET, FILM COATED ORAL DAILY
Status: DISCONTINUED | OUTPATIENT
Start: 2025-08-07 | End: 2025-08-08 | Stop reason: HOSPADM

## 2025-08-07 RX ORDER — LANOLIN ALCOHOL/MO/W.PET/CERES
800 CREAM (GRAM) TOPICAL
Status: DISCONTINUED | OUTPATIENT
Start: 2025-08-07 | End: 2025-08-08 | Stop reason: HOSPADM

## 2025-08-07 RX ORDER — BUPROPION HYDROCHLORIDE 150 MG/1
300 TABLET ORAL DAILY PRN
Status: DISCONTINUED | OUTPATIENT
Start: 2025-08-07 | End: 2025-08-08 | Stop reason: HOSPADM

## 2025-08-07 RX ORDER — IBUPROFEN 200 MG
24 TABLET ORAL
Status: DISCONTINUED | OUTPATIENT
Start: 2025-08-07 | End: 2025-08-08 | Stop reason: HOSPADM

## 2025-08-07 RX ORDER — ONDANSETRON HYDROCHLORIDE 2 MG/ML
4 INJECTION, SOLUTION INTRAVENOUS DAILY PRN
Status: DISCONTINUED | OUTPATIENT
Start: 2025-08-07 | End: 2025-08-07 | Stop reason: HOSPADM

## 2025-08-07 RX ORDER — HYDROCODONE BITARTRATE AND ACETAMINOPHEN 5; 325 MG/1; MG/1
1 TABLET ORAL EVERY 6 HOURS PRN
Refills: 0 | Status: DISCONTINUED | OUTPATIENT
Start: 2025-08-07 | End: 2025-08-07 | Stop reason: SDUPTHER

## 2025-08-07 RX ADMIN — EPHEDRINE SULFATE 10 MG: 50 INJECTION, SOLUTION INTRAMUSCULAR; INTRAVENOUS; SUBCUTANEOUS at 11:08

## 2025-08-07 RX ADMIN — PHENYLEPHRINE HYDROCHLORIDE 200 MCG: 10 INJECTION INTRAVENOUS at 11:08

## 2025-08-07 RX ADMIN — SODIUM CHLORIDE, SODIUM GLUCONATE, SODIUM ACETATE, POTASSIUM CHLORIDE AND MAGNESIUM CHLORIDE: 526; 502; 368; 37; 30 INJECTION, SOLUTION INTRAVENOUS at 12:08

## 2025-08-07 RX ADMIN — ONDANSETRON 4 MG: 2 INJECTION INTRAMUSCULAR; INTRAVENOUS at 11:08

## 2025-08-07 RX ADMIN — ROCURONIUM BROMIDE 10 MG: 10 INJECTION, SOLUTION INTRAVENOUS at 11:08

## 2025-08-07 RX ADMIN — PROPOFOL 150 MG: 10 INJECTION, EMULSION INTRAVENOUS at 11:08

## 2025-08-07 RX ADMIN — Medication: at 01:08

## 2025-08-07 RX ADMIN — SODIUM CHLORIDE, SODIUM GLUCONATE, SODIUM ACETATE, POTASSIUM CHLORIDE AND MAGNESIUM CHLORIDE: 526; 502; 368; 37; 30 INJECTION, SOLUTION INTRAVENOUS at 09:08

## 2025-08-07 RX ADMIN — CARVEDILOL 12.5 MG: 6.25 TABLET, FILM COATED ORAL at 08:08

## 2025-08-07 RX ADMIN — FENTANYL CITRATE 75 MCG: 50 INJECTION, SOLUTION INTRAMUSCULAR; INTRAVENOUS at 10:08

## 2025-08-07 RX ADMIN — CEFAZOLIN 2 G: 2 INJECTION, POWDER, FOR SOLUTION INTRAMUSCULAR; INTRAVENOUS at 11:08

## 2025-08-07 RX ADMIN — CEFAZOLIN 2 G: 2 INJECTION, POWDER, FOR SOLUTION INTRAMUSCULAR; INTRAVENOUS at 05:08

## 2025-08-07 RX ADMIN — MUPIROCIN 1 G: 20 OINTMENT TOPICAL at 08:08

## 2025-08-07 RX ADMIN — FENTANYL CITRATE 25 MCG: 50 INJECTION, SOLUTION INTRAMUSCULAR; INTRAVENOUS at 11:08

## 2025-08-07 RX ADMIN — LIDOCAINE HYDROCHLORIDE 100 MG: 20 INJECTION, SOLUTION INTRAVENOUS at 11:08

## 2025-08-07 RX ADMIN — FENTANYL CITRATE 25 MCG: 50 INJECTION INTRAMUSCULAR; INTRAVENOUS at 12:08

## 2025-08-07 RX ADMIN — ONDANSETRON 4 MG: 2 INJECTION INTRAMUSCULAR; INTRAVENOUS at 01:08

## 2025-08-07 RX ADMIN — SERTRALINE HYDROCHLORIDE 50 MG: 50 TABLET ORAL at 08:08

## 2025-08-07 RX ADMIN — OXYCODONE HYDROCHLORIDE 5 MG: 5 TABLET ORAL at 01:08

## 2025-08-07 RX ADMIN — ACETAMINOPHEN 1000 MG: 10 INJECTION INTRAVENOUS at 11:08

## 2025-08-07 RX ADMIN — PROCHLORPERAZINE EDISYLATE 5 MG: 5 INJECTION INTRAMUSCULAR; INTRAVENOUS at 02:08

## 2025-08-07 RX ADMIN — PHENYLEPHRINE HYDROCHLORIDE 100 MCG: 10 INJECTION INTRAVENOUS at 11:08

## 2025-08-07 RX ADMIN — SUCCINYLCHOLINE CHLORIDE 120 MG: 20 INJECTION, SOLUTION INTRAMUSCULAR; INTRAVENOUS at 11:08

## 2025-08-07 RX ADMIN — DEXAMETHASONE SODIUM PHOSPHATE 4 MG: 4 INJECTION, SOLUTION INTRA-ARTICULAR; INTRALESIONAL; INTRAMUSCULAR; INTRAVENOUS; SOFT TISSUE at 11:08

## 2025-08-07 RX ADMIN — SODIUM CHLORIDE, POTASSIUM CHLORIDE, SODIUM LACTATE AND CALCIUM CHLORIDE: 600; 310; 30; 20 INJECTION, SOLUTION INTRAVENOUS at 01:08

## 2025-08-07 RX ADMIN — GLYCOPYRROLATE 0.2 MG: 0.2 INJECTION, SOLUTION INTRAMUSCULAR; INTRAVITREAL at 11:08

## 2025-08-07 NOTE — ANESTHESIA POSTPROCEDURE EVALUATION
Anesthesia Post Evaluation    Patient: Anna Wagoner    Procedure(s) Performed: Procedure(s) (LRB):  LAMINOTOMY (Right)    Final Anesthesia Type: general      Patient location during evaluation: PACU  Patient participation: Yes- Able to Participate  Level of consciousness: awake and alert  Post-procedure vital signs: reviewed and stable  Pain management: adequate  Airway patency: patent    PONV status at discharge: No PONV  Anesthetic complications: no      Cardiovascular status: hemodynamically stable  Respiratory status: unassisted and room air  Hydration status: euvolemic  Follow-up not needed.              Vitals Value Taken Time   /62 08/07/25 14:11   Temp 36.7 °C (98.1 °F) 08/07/25 14:11   Pulse 73 08/07/25 14:11   Resp 12 08/07/25 14:11   SpO2 100 % 08/07/25 14:11         Event Time   Out of Recovery 13:45:00         Pain/Yuni Score: Pain Rating Prior to Med Admin: 4 (8/7/2025  2:13 PM)  Yuni Score: 9 (8/7/2025  1:45 PM)

## 2025-08-07 NOTE — TRANSFER OF CARE
"Anesthesia Transfer of Care Note    Patient: Anna Wagoner    Procedure(s) Performed: Procedure(s) (LRB):  LAMINOTOMY (Right)    Patient location: PACU    Anesthesia Type: general    Transport from OR: Transported from OR on 2-3 L/min O2 by NC with adequate spontaneous ventilation    Post pain: adequate analgesia    Post assessment: no apparent anesthetic complications    Post vital signs: stable    Level of consciousness: awake    Nausea/Vomiting: no nausea/vomiting    Complications: none    Transfer of care protocol was followed    Last vitals: Visit Vitals  BP (!) 103/56 (BP Location: Left arm, Patient Position: Lying)   Pulse 67   Temp 36.8 °C (98.2 °F)   Resp 20   Ht 5' 3" (1.6 m)   Wt 79.4 kg (175 lb)   SpO2 98%   Breastfeeding No   BMI 31.00 kg/m²     "

## 2025-08-07 NOTE — ANESTHESIA PREPROCEDURE EVALUATION
08/07/2025  Anna Wagoner is a 70 y.o., female.      Pre-op Assessment    I have reviewed the Patient Summary Reports.     I have reviewed the Nursing Notes. I have reviewed the NPO Status.   I have reviewed the Medications.     Review of Systems  Anesthesia Hx:  No problems with previous Anesthesia                Social:  Former Smoker       Cardiovascular:     Hypertension                                    Hypertension         Pulmonary:        Sleep Apnea     Obstructive Sleep Apnea (RED).           Hepatic/GI:     GERD         Gerd          Musculoskeletal:         Spine Disorders: lumbar            Endocrine:   Hypothyroidism       Hypothyroidism          Psych:   anxiety                 Physical Exam  General: Well nourished, Cooperative, Alert and Oriented    Airway:  Mallampati: II   Mouth Opening: Normal  TM Distance: Normal  Tongue: Normal  Neck ROM: Normal ROM    Dental:  Intact    Chest/Lungs:  Normal Respiratory Rate    Heart:  Rate: Normal        Anesthesia Plan  Type of Anesthesia, risks & benefits discussed:    Anesthesia Type: Gen ETT  Intra-op Monitoring Plan: Standard ASA Monitors  Post Op Pain Control Plan: multimodal analgesia and IV/PO Opioids PRN  Induction:  IV  Airway Plan: Direct and Video, Post-Induction  Informed Consent: Informed consent signed with the Patient and all parties understand the risks and agree with anesthesia plan.  All questions answered.   ASA Score: 2  Day of Surgery Review of History & Physical: H&P Update referred to the surgeon/provider.    Ready For Surgery From Anesthesia Perspective.     .

## 2025-08-07 NOTE — ANESTHESIA PROCEDURE NOTES
Intubation    Date/Time: 8/7/2025 11:03 AM    Performed by: Timothy Ni CRNA  Authorized by: Kang Torres MD    Intubation:     Induction:  Intravenous    Intubated:  Postinduction    Mask Ventilation:  Easy mask    Attempts:  1    Attempted By:  CRNA    Method of Intubation:  Video laryngoscopy    Blade:  Myrick 3    Laryngeal View Grade: Grade IIA - cords partially seen      Difficult Airway Encountered?: No      Complications:  None    Airway Device:  Oral endotracheal tube    Airway Device Size:  7.5    Style/Cuff Inflation:  Cuffed    Tube secured:  22    Secured at:  The lips    Placement Verified By:  Capnometry    Complicating Factors:  None    Findings Post-Intubation:  BS equal bilateral

## 2025-08-08 LAB
ABSOLUTE EOSINOPHIL (SMH): 0 K/UL
ABSOLUTE MONOCYTE (SMH): 1.07 K/UL (ref 0.3–1)
ABSOLUTE NEUTROPHIL COUNT (SMH): 10.9 K/UL (ref 1.8–7.7)
ANION GAP (SMH): 9 MMOL/L (ref 8–16)
BASOPHILS # BLD AUTO: 0.03 K/UL
BASOPHILS NFR BLD AUTO: 0.2 %
BUN SERPL-MCNC: 9 MG/DL (ref 8–23)
CALCIUM SERPL-MCNC: 9.5 MG/DL (ref 8.7–10.5)
CHLORIDE SERPL-SCNC: 107 MMOL/L (ref 95–110)
CO2 SERPL-SCNC: 29 MMOL/L (ref 23–29)
CREAT SERPL-MCNC: 0.7 MG/DL (ref 0.5–1.4)
ERYTHROCYTE [DISTWIDTH] IN BLOOD BY AUTOMATED COUNT: 12.9 % (ref 11.5–14.5)
GFR SERPLBLD CREATININE-BSD FMLA CKD-EPI: >60 ML/MIN/1.73/M2
GLUCOSE SERPL-MCNC: 115 MG/DL (ref 70–110)
HCT VFR BLD AUTO: 41.3 % (ref 37–48.5)
HGB BLD-MCNC: 13.2 GM/DL (ref 12–16)
IMM GRANULOCYTES # BLD AUTO: 0.09 K/UL (ref 0–0.04)
IMM GRANULOCYTES NFR BLD AUTO: 0.7 % (ref 0–0.5)
LYMPHOCYTES # BLD AUTO: 1.41 K/UL (ref 1–4.8)
MAGNESIUM SERPL-MCNC: 2.3 MG/DL (ref 1.6–2.6)
MCH RBC QN AUTO: 28.5 PG (ref 27–31)
MCHC RBC AUTO-ENTMCNC: 32 G/DL (ref 32–36)
MCV RBC AUTO: 89 FL (ref 82–98)
NUCLEATED RBC (/100WBC) (SMH): 0 /100 WBC
PLATELET # BLD AUTO: 261 K/UL (ref 150–450)
PMV BLD AUTO: 10.7 FL (ref 9.2–12.9)
POTASSIUM SERPL-SCNC: 5.2 MMOL/L (ref 3.5–5.1)
RBC # BLD AUTO: 4.63 M/UL (ref 4–5.4)
RELATIVE EOSINOPHIL (SMH): 0 % (ref 0–8)
RELATIVE LYMPHOCYTE (SMH): 10.5 % (ref 18–48)
RELATIVE MONOCYTE (SMH): 7.9 % (ref 4–15)
RELATIVE NEUTROPHIL (SMH): 80.7 % (ref 38–73)
SODIUM SERPL-SCNC: 145 MMOL/L (ref 136–145)
WBC # BLD AUTO: 13.48 K/UL (ref 3.9–12.7)

## 2025-08-08 PROCEDURE — 94761 N-INVAS EAR/PLS OXIMETRY MLT: CPT

## 2025-08-08 PROCEDURE — 25000003 PHARM REV CODE 250

## 2025-08-08 PROCEDURE — 83735 ASSAY OF MAGNESIUM: CPT

## 2025-08-08 PROCEDURE — 94799 UNLISTED PULMONARY SVC/PX: CPT

## 2025-08-08 PROCEDURE — 97161 PT EVAL LOW COMPLEX 20 MIN: CPT

## 2025-08-08 PROCEDURE — 36415 COLL VENOUS BLD VENIPUNCTURE: CPT | Performed by: ORTHOPAEDIC SURGERY

## 2025-08-08 PROCEDURE — 63600175 PHARM REV CODE 636 W HCPCS: Performed by: ORTHOPAEDIC SURGERY

## 2025-08-08 PROCEDURE — 97165 OT EVAL LOW COMPLEX 30 MIN: CPT

## 2025-08-08 PROCEDURE — 85025 COMPLETE CBC W/AUTO DIFF WBC: CPT | Performed by: ORTHOPAEDIC SURGERY

## 2025-08-08 PROCEDURE — 25000003 PHARM REV CODE 250: Performed by: ORTHOPAEDIC SURGERY

## 2025-08-08 PROCEDURE — 80048 BASIC METABOLIC PNL TOTAL CA: CPT | Performed by: ORTHOPAEDIC SURGERY

## 2025-08-08 PROCEDURE — 94760 N-INVAS EAR/PLS OXIMETRY 1: CPT

## 2025-08-08 PROCEDURE — 97535 SELF CARE MNGMENT TRAINING: CPT

## 2025-08-08 RX ADMIN — ONDANSETRON 8 MG: 8 TABLET, ORALLY DISINTEGRATING ORAL at 09:08

## 2025-08-08 RX ADMIN — MUPIROCIN 1 G: 20 OINTMENT TOPICAL at 08:08

## 2025-08-08 RX ADMIN — ATORVASTATIN CALCIUM 10 MG: 10 TABLET, FILM COATED ORAL at 08:08

## 2025-08-08 RX ADMIN — SENNOSIDES, DOCUSATE SODIUM 1 TABLET: 50; 8.6 TABLET, FILM COATED ORAL at 08:08

## 2025-08-08 RX ADMIN — DOCUSATE SODIUM 100 MG: 100 CAPSULE, LIQUID FILLED ORAL at 08:08

## 2025-08-08 RX ADMIN — SODIUM ZIRCONIUM CYCLOSILICATE 5 G: 5 POWDER, FOR SUSPENSION ORAL at 11:08

## 2025-08-08 RX ADMIN — SODIUM CHLORIDE, POTASSIUM CHLORIDE, SODIUM LACTATE AND CALCIUM CHLORIDE: 600; 310; 30; 20 INJECTION, SOLUTION INTRAVENOUS at 05:08

## 2025-08-08 RX ADMIN — LIOTHYRONINE SODIUM 5 MCG: 5 TABLET ORAL at 08:08

## 2025-08-08 RX ADMIN — CEFAZOLIN 2 G: 2 INJECTION, POWDER, FOR SOLUTION INTRAMUSCULAR; INTRAVENOUS at 05:08

## 2025-08-08 RX ADMIN — DIPHENHYDRAMINE HYDROCHLORIDE 50 MG: 25 CAPSULE ORAL at 09:08

## 2025-08-08 RX ADMIN — CARVEDILOL 12.5 MG: 6.25 TABLET, FILM COATED ORAL at 08:08

## 2025-08-08 RX ADMIN — HYDROMORPHONE HYDROCHLORIDE 2 MG: 2 INJECTION INTRAMUSCULAR; INTRAVENOUS; SUBCUTANEOUS at 08:08

## 2025-08-11 VITALS
HEIGHT: 63 IN | SYSTOLIC BLOOD PRESSURE: 131 MMHG | RESPIRATION RATE: 16 BRPM | OXYGEN SATURATION: 95 % | TEMPERATURE: 98 F | BODY MASS INDEX: 33.43 KG/M2 | HEART RATE: 81 BPM | DIASTOLIC BLOOD PRESSURE: 70 MMHG | WEIGHT: 188.69 LBS

## 2025-08-22 ENCOUNTER — OFFICE VISIT (OUTPATIENT)
Dept: ORTHOPEDICS | Facility: CLINIC | Age: 70
End: 2025-08-22
Payer: MEDICARE

## 2025-08-22 VITALS — HEIGHT: 63 IN | BODY MASS INDEX: 33.43 KG/M2 | WEIGHT: 188.69 LBS

## 2025-08-22 DIAGNOSIS — Z98.890 STATUS POST LUMBAR DISCECTOMY: Primary | ICD-10-CM

## 2025-08-22 PROCEDURE — 99999 PR PBB SHADOW E&M-EST. PATIENT-LVL III: CPT | Mod: PBBFAC,,,

## 2025-08-22 PROCEDURE — 99213 OFFICE O/P EST LOW 20 MIN: CPT | Mod: PBBFAC,PN

## 2025-08-25 ENCOUNTER — PATIENT MESSAGE (OUTPATIENT)
Facility: CLINIC | Age: 70
End: 2025-08-25
Payer: MEDICARE

## 2025-09-04 ENCOUNTER — TELEPHONE (OUTPATIENT)
Facility: CLINIC | Age: 70
End: 2025-09-04
Payer: MEDICARE

## (undated) DEVICE — TOWEL OR DISP STRL BLUE 4/PK

## (undated) DEVICE — SUT 2/0 18IN SILK BLK BRAID

## (undated) DEVICE — ELECTRODE MEGADYNE RETURN DUAL

## (undated) DEVICE — NDL SPINAL 18GX3.5 SPINOCAN

## (undated) DEVICE — SPONGE LAP 18X18 PREWASHED

## (undated) DEVICE — SYR DISP LL 5CC

## (undated) DEVICE — DRIVER AUTO DISP OSTEODRIVE

## (undated) DEVICE — NDL BLUNT W/O FILTER 18GX1.5IN

## (undated) DEVICE — DRAPE INCISE IOBAN 2 23X17IN

## (undated) DEVICE — HEMOSTAT SURGICEL 4X8IN

## (undated) DEVICE — GLOVE SENSICARE PI ALOE 6

## (undated) DEVICE — SENSIGHT LEAD TEST CABLE KIT

## (undated) DEVICE — DRAPE C ARM 42 X 120 10/BX

## (undated) DEVICE — GLOVE SENSICARE PI GRN 8.5

## (undated) DEVICE — ELECTRODE BLD ULTRA 4IN STRL

## (undated) DEVICE — SUT 2-0 12-18IN SILK

## (undated) DEVICE — SUT MONOCRYL 4-0 PS-1 UND

## (undated) DEVICE — DRAPE STERI INSTRUMENT 1018

## (undated) DEVICE — GLOVE SENSICARE PI GRN 6.5

## (undated) DEVICE — APPLICATOR CHLORAPREP ORN 26ML

## (undated) DEVICE — CHLORAPREP 10.5 ML APPLICATOR

## (undated) DEVICE — GLOVE SENSICARE PI GRN 7.5

## (undated) DEVICE — NDL FLTR 5MCRN BLNT TIP 18GX1

## (undated) DEVICE — NDL SPINAL 22GX5

## (undated) DEVICE — ALCOHOL 70% ANTISEPTIC ISO 4OZ

## (undated) DEVICE — NDL ECLIPSE SAF REG 25GX1.5IN

## (undated) DEVICE — LINER SUCTION 3000CC

## (undated) DEVICE — GLOVE SENSICARE PI ALOE 6.5

## (undated) DEVICE — DRESSING TELFA N ADH 3X8

## (undated) DEVICE — ADHESIVE MASTISOL VIAL 48/BX

## (undated) DEVICE — STRAP OR TABLE 5IN X 72IN

## (undated) DEVICE — NEXPROBE

## (undated) DEVICE — SEE MEDLINE ITEM 156905

## (undated) DEVICE — NDL 18GA X1 1/2 REG BEVEL

## (undated) DEVICE — SYS LABEL CORRECT MED

## (undated) DEVICE — DIFFUSER

## (undated) DEVICE — DRESSING TEGADERM 4.4X5IN

## (undated) DEVICE — SUT BONE WAX 2.5 GRMS 12/BX

## (undated) DEVICE — DEVICE PLASMABLADE X 3.0S LT

## (undated) DEVICE — BILATERAL NEXFRAME KIT

## (undated) DEVICE — GLOVE SENSICARE PI GRN 6

## (undated) DEVICE — FIDUCIAL KIT UNI STEREO 10 MM
Type: IMPLANTABLE DEVICE | Site: CRANIAL | Status: NON-FUNCTIONAL
Removed: 2023-07-11

## (undated) DEVICE — PAD GROUNDING DISPER ELECTRODE

## (undated) DEVICE — SUT VICRYL ANTI 2-0 27IN

## (undated) DEVICE — NEUROPROBE

## (undated) DEVICE — FORCEP BAYO INSU SGL USE STRGT

## (undated) DEVICE — CARTRIDGE OIL

## (undated) DEVICE — NDL SPINAL 25GX3.5 SPINOCAN

## (undated) DEVICE — NDL SAFETY 25G X 1.5 ECLIPSE

## (undated) DEVICE — SUT CTD VICRYL CR/RB-1 4-0

## (undated) DEVICE — CANNULA

## (undated) DEVICE — CORD BIPOLAR 12 FOOT

## (undated) DEVICE — SPONGE BULKEE II ABSRB 6X6.75

## (undated) DEVICE — CANNULA RADIOPAQUE 20G CURVED

## (undated) DEVICE — SOL NACL IRR 1000ML BTL

## (undated) DEVICE — DRESSING GAUZE OIL EMUL 3X8

## (undated) DEVICE — SPONGE COTTON TRAY 4X4IN

## (undated) DEVICE — ELECTRODE'S CABLE

## (undated) DEVICE — GAUZE SPONGE 4X4 12PLY

## (undated) DEVICE — TUBE FRAZIER 5MM 2FT SOFT TIP

## (undated) DEVICE — COVER PROXIMA MAYO STAND

## (undated) DEVICE — GUIDE ENDOCAVITY NEEDLE 3.5X20

## (undated) DEVICE — BUR BONE CUT MICRO TPS 3X3.8MM

## (undated) DEVICE — BOOT SUTURE AID

## (undated) DEVICE — PROGRAMMER PERCEPT PC DEVICE

## (undated) DEVICE — MARKER SKIN STND TIP BLUE BARR

## (undated) DEVICE — SPONGE DERMACEA GAUZE 4X4

## (undated) DEVICE — RUBBERBAND STERILE 3X1/8IN

## (undated) DEVICE — SUT SILK 3-0 CR/RB-1 8-18

## (undated) DEVICE — SOL SOD CHLORIDE 0.9% 10ML

## (undated) DEVICE — PENCIL SMK EVAC CONNECTOR 10FT

## (undated) DEVICE — NDL TUOHY EPIDURAL 20G X 3.5

## (undated) DEVICE — SYR 10CC LUER LOCK

## (undated) DEVICE — SUT CTD VICRYL 1 UND OS-8

## (undated) DEVICE — YANKAUER OPEN TIP W/O VENT

## (undated) DEVICE — DRAPE MEDIUM SHEET 40X70IN

## (undated) DEVICE — DRESSING SURGICAL 1/2X1/2

## (undated) DEVICE — ELECTRODE BLADE E-Z CLEAN 4IN

## (undated) DEVICE — BIT DRILL PERFORATOR DISP 14MM

## (undated) DEVICE — TRAY NEURO OMC

## (undated) DEVICE — DRESSING TEGASORB THIN 4X4 3/4

## (undated) DEVICE — Device

## (undated) DEVICE — ROUTER TAPERED 2.3MM

## (undated) DEVICE — PAD ABDOMINAL STERILE 8X10IN

## (undated) DEVICE — FIDUCIAL KIT UNI STEREO 10 MM
Type: IMPLANTABLE DEVICE | Site: CRANIAL | Status: NON-FUNCTIONAL
Removed: 2023-04-18

## (undated) DEVICE — NDL BLNT STD 1.5IN 18G

## (undated) DEVICE — SKIN MARKER STER DUAL TIP

## (undated) DEVICE — BIT PERFORATOR LARGE

## (undated) DEVICE — TAPE ADH MEDIPORE 4 X 10YDS

## (undated) DEVICE — DRAPE INVISISHIELD TOWEL SMALL

## (undated) DEVICE — SLEEVE SCD EXPRESS KNEE MEDIUM

## (undated) DEVICE — SPONGE GAUZE 16PLY 4X4

## (undated) DEVICE — ELECTRODE REM PLYHSV RETURN 9

## (undated) DEVICE — DISPOSABLE SCREWDRIVER

## (undated) DEVICE — TRAY CATH FOL SIL URIMTR 16FR

## (undated) DEVICE — DRAPE IOBAN 2 STERI

## (undated) DEVICE — DRAPE THREE-QTR REINF 53X77IN

## (undated) DEVICE — BANDAGE ROLL COTTN 4.5INX4.1YD

## (undated) DEVICE — TAPE MEDIPORE 4IN X 2YDS

## (undated) DEVICE — TUBING MINIBORE EXTENSION

## (undated) DEVICE — STAPLER SKIN PROXIMATE WIDE

## (undated) DEVICE — CANNULA SUPERSOFT CO2 7FT

## (undated) DEVICE — DRAPE CORETEMP FLD WRM 56X62IN

## (undated) DEVICE — DRAPE T THYROID STERILE

## (undated) DEVICE — STRIP MEDI WND CLSR 1/2X4IN

## (undated) DEVICE — SUT VICRYL PLUS 4-0 P3 18IN

## (undated) DEVICE — GLOVE SENSICARE PI ALOE 7.5

## (undated) DEVICE — SUT VICRYL PLUS 3-0 SH 18IN

## (undated) DEVICE — DRAPE C-ARMOR EQUIPMENT COVER

## (undated) DEVICE — PACK CUSTOM LUMBAR LAM SLI